# Patient Record
Sex: MALE | Race: ASIAN | NOT HISPANIC OR LATINO | URBAN - METROPOLITAN AREA
[De-identification: names, ages, dates, MRNs, and addresses within clinical notes are randomized per-mention and may not be internally consistent; named-entity substitution may affect disease eponyms.]

---

## 2019-11-12 ENCOUNTER — INPATIENT (INPATIENT)
Facility: HOSPITAL | Age: 74
LOS: 7 days | Discharge: NURSING, REHAB OR LONG-TERM CARE | End: 2019-11-20
Attending: STUDENT IN AN ORGANIZED HEALTH CARE EDUCATION/TRAINING PROGRAM | Admitting: STUDENT IN AN ORGANIZED HEALTH CARE EDUCATION/TRAINING PROGRAM
Payer: MEDICAID

## 2019-11-12 VITALS
TEMPERATURE: 98 F | OXYGEN SATURATION: 96 % | DIASTOLIC BLOOD PRESSURE: 33 MMHG | SYSTOLIC BLOOD PRESSURE: 109 MMHG | HEART RATE: 78 BPM | RESPIRATION RATE: 16 BRPM

## 2019-11-12 LAB
ALBUMIN SERPL ELPH-MCNC: 3.7 G/DL — SIGNIFICANT CHANGE UP (ref 3.3–5)
ALP SERPL-CCNC: 98 U/L — SIGNIFICANT CHANGE UP (ref 40–120)
ALT FLD-CCNC: 43 U/L — HIGH (ref 4–41)
ANION GAP SERPL CALC-SCNC: 20 MMO/L — HIGH (ref 7–14)
APAP SERPL-MCNC: < 15 UG/ML — LOW (ref 15–25)
APPEARANCE UR: CLEAR — SIGNIFICANT CHANGE UP
APTT BLD: 26.8 SEC — LOW (ref 27.5–36.3)
AST SERPL-CCNC: 30 U/L — SIGNIFICANT CHANGE UP (ref 4–40)
BACTERIA # UR AUTO: NEGATIVE — SIGNIFICANT CHANGE UP
BASOPHILS # BLD AUTO: 0.05 K/UL — SIGNIFICANT CHANGE UP (ref 0–0.2)
BASOPHILS NFR BLD AUTO: 0.4 % — SIGNIFICANT CHANGE UP (ref 0–2)
BILIRUB SERPL-MCNC: 0.4 MG/DL — SIGNIFICANT CHANGE UP (ref 0.2–1.2)
BILIRUB UR-MCNC: NEGATIVE — SIGNIFICANT CHANGE UP
BLOOD UR QL VISUAL: NEGATIVE — SIGNIFICANT CHANGE UP
BUN SERPL-MCNC: 59 MG/DL — HIGH (ref 7–23)
CALCIUM SERPL-MCNC: 10.1 MG/DL — SIGNIFICANT CHANGE UP (ref 8.4–10.5)
CHLORIDE SERPL-SCNC: 110 MMOL/L — HIGH (ref 98–107)
CO2 SERPL-SCNC: 20 MMOL/L — LOW (ref 22–31)
COLOR SPEC: YELLOW — SIGNIFICANT CHANGE UP
CREAT SERPL-MCNC: 1.98 MG/DL — HIGH (ref 0.5–1.3)
EOSINOPHIL # BLD AUTO: 0.27 K/UL — SIGNIFICANT CHANGE UP (ref 0–0.5)
EOSINOPHIL NFR BLD AUTO: 2.2 % — SIGNIFICANT CHANGE UP (ref 0–6)
ETHANOL BLD-MCNC: < 10 MG/DL — SIGNIFICANT CHANGE UP
GLUCOSE SERPL-MCNC: 207 MG/DL — HIGH (ref 70–99)
GLUCOSE UR-MCNC: NEGATIVE — SIGNIFICANT CHANGE UP
HCT VFR BLD CALC: 39.9 % — SIGNIFICANT CHANGE UP (ref 39–50)
HGB BLD-MCNC: 12.8 G/DL — LOW (ref 13–17)
HYALINE CASTS # UR AUTO: NEGATIVE — SIGNIFICANT CHANGE UP
IMM GRANULOCYTES NFR BLD AUTO: 1.2 % — SIGNIFICANT CHANGE UP (ref 0–1.5)
INR BLD: 1.03 — SIGNIFICANT CHANGE UP (ref 0.88–1.17)
KETONES UR-MCNC: NEGATIVE — SIGNIFICANT CHANGE UP
LEUKOCYTE ESTERASE UR-ACNC: NEGATIVE — SIGNIFICANT CHANGE UP
LYMPHOCYTES # BLD AUTO: 2.69 K/UL — SIGNIFICANT CHANGE UP (ref 1–3.3)
LYMPHOCYTES # BLD AUTO: 21.5 % — SIGNIFICANT CHANGE UP (ref 13–44)
MAGNESIUM SERPL-MCNC: 2.3 MG/DL — SIGNIFICANT CHANGE UP (ref 1.6–2.6)
MCHC RBC-ENTMCNC: 28.4 PG — SIGNIFICANT CHANGE UP (ref 27–34)
MCHC RBC-ENTMCNC: 32.1 % — SIGNIFICANT CHANGE UP (ref 32–36)
MCV RBC AUTO: 88.7 FL — SIGNIFICANT CHANGE UP (ref 80–100)
MONOCYTES # BLD AUTO: 0.67 K/UL — SIGNIFICANT CHANGE UP (ref 0–0.9)
MONOCYTES NFR BLD AUTO: 5.4 % — SIGNIFICANT CHANGE UP (ref 2–14)
NEUTROPHILS # BLD AUTO: 8.68 K/UL — HIGH (ref 1.8–7.4)
NEUTROPHILS NFR BLD AUTO: 69.3 % — SIGNIFICANT CHANGE UP (ref 43–77)
NITRITE UR-MCNC: NEGATIVE — SIGNIFICANT CHANGE UP
NRBC # FLD: 0.04 K/UL — SIGNIFICANT CHANGE UP (ref 0–0)
PH UR: 6 — SIGNIFICANT CHANGE UP (ref 5–8)
PLATELET # BLD AUTO: 315 K/UL — SIGNIFICANT CHANGE UP (ref 150–400)
PMV BLD: SIGNIFICANT CHANGE UP FL (ref 7–13)
POTASSIUM SERPL-MCNC: 5.6 MMOL/L — HIGH (ref 3.5–5.3)
POTASSIUM SERPL-SCNC: 5.6 MMOL/L — HIGH (ref 3.5–5.3)
PROT SERPL-MCNC: 7 G/DL — SIGNIFICANT CHANGE UP (ref 6–8.3)
PROT UR-MCNC: 20 — SIGNIFICANT CHANGE UP
PROTHROM AB SERPL-ACNC: 11.8 SEC — SIGNIFICANT CHANGE UP (ref 9.8–13.1)
RBC # BLD: 4.5 M/UL — SIGNIFICANT CHANGE UP (ref 4.2–5.8)
RBC # FLD: 14 % — SIGNIFICANT CHANGE UP (ref 10.3–14.5)
RBC CASTS # UR COMP ASSIST: SIGNIFICANT CHANGE UP (ref 0–?)
REVIEW TO FOLLOW: YES — SIGNIFICANT CHANGE UP
SALICYLATES SERPL-MCNC: < 5 MG/DL — LOW (ref 15–30)
SODIUM SERPL-SCNC: 150 MMOL/L — HIGH (ref 135–145)
SP GR SPEC: 1.02 — SIGNIFICANT CHANGE UP (ref 1–1.04)
SQUAMOUS # UR AUTO: SIGNIFICANT CHANGE UP
TROPONIN T, HIGH SENSITIVITY: 58 NG/L — CRITICAL HIGH (ref ?–14)
TSH SERPL-MCNC: 2.38 UIU/ML — SIGNIFICANT CHANGE UP (ref 0.27–4.2)
UROBILINOGEN FLD QL: NORMAL — SIGNIFICANT CHANGE UP
WBC # BLD: 12.51 K/UL — HIGH (ref 3.8–10.5)
WBC # FLD AUTO: 12.51 K/UL — HIGH (ref 3.8–10.5)
WBC UR QL: SIGNIFICANT CHANGE UP (ref 0–?)

## 2019-11-12 PROCEDURE — 71045 X-RAY EXAM CHEST 1 VIEW: CPT | Mod: 26

## 2019-11-12 PROCEDURE — 70450 CT HEAD/BRAIN W/O DYE: CPT | Mod: 26

## 2019-11-12 RX ORDER — SODIUM CHLORIDE 9 MG/ML
1000 INJECTION INTRAMUSCULAR; INTRAVENOUS; SUBCUTANEOUS
Refills: 0 | Status: DISCONTINUED | OUTPATIENT
Start: 2019-11-12 | End: 2019-11-13

## 2019-11-12 RX ORDER — SODIUM CHLORIDE 9 MG/ML
1000 INJECTION INTRAMUSCULAR; INTRAVENOUS; SUBCUTANEOUS ONCE
Refills: 0 | Status: DISCONTINUED | OUTPATIENT
Start: 2019-11-12 | End: 2019-11-12

## 2019-11-12 NOTE — ED ADULT NURSE NOTE - CHIEF COMPLAINT QUOTE
A and O x 2 usually x 4 last normal 90 min ago as per staff at [facility]   drowsy in triage   hx CVA DM HTN      [facility] called for stroke eval   no code stroke called

## 2019-11-12 NOTE — ED PROVIDER NOTE - PROGRESS NOTE DETAILS
Elevated lactate responding to IV fluids. Neurology consulted for lacunar ischemic area seen on CT scan. Patient AOx1

## 2019-11-12 NOTE — ED ADULT TRIAGE NOTE - CHIEF COMPLAINT QUOTE
A and O x 2 usually x 4 last normal 90 min ago as per staff at [facility]   drowsy in triage   hx CVA DM HTN      [facility] called for stroke eval A and O x 2 usually x 4 last normal 90 min ago as per staff at [facility]   drowsy in triage   hx CVA DM HTN      [facility] called for stroke eval   no code stroke called

## 2019-11-12 NOTE — ED PROVIDER NOTE - CLINICAL SUMMARY MEDICAL DECISION MAKING FREE TEXT BOX
75 yo HTN, HLD, TIIDM, CVA, CAD s/p double byspass presenting for AMS potentially in setting of severe volume depletion of ischemic stroke, however last normal unknown.  -cbc, cmp, pt/INR, VBG  -CT head  -UA, BCX, UCX  -IVF 73 yo HTN, HLD, TIIDM, CVA, CAD s/p double byspass presenting for AMS potentially in setting of severe volume depletion of ischemic stroke, however last normal unknown. No sign of infectious etiology, no signs of ischemia.   -cbc, cmp, pt/INR, VBG  -CT head  -UA, BCX, UCX  -IVF 73 yo HTN, HLD, TIIDM, CVA, CAD s/p double byspass presenting for AMS potentially in setting of severe volume depletion of ischemic stroke, however last normal unknown. No sign of infectious etiology, no signs of ischemia.   -cbc, cmp, pt/INR, VBG  -CT head  -UA, BCX, UCX  -IVF    Dr. Washington: I agree with the above provided history and exam and addend/modify it as follows.  74M hx HTN, DMII, HLD, CVA without residual deficit, CAD s/p 2vCABG, recent admission for UTI with hyponatremia with placement in [facility] now sent in for AMS.  Patient was last seen normal by his family 36 hours prior to presentation: at baseline is conversant and oriented, now drowsy, not conversant, possible right facial droop although moving all extremities.  Nursing facility called and unable to give a time last known normal: state that the patient has become gradually more confused over the past day.  No recent fever, chills, or cough to their knowledge.  Stroke considered however out the window for TPA or percutaneous therapy.  Will R/O ICH.  No clear evidence of infection at this time but will screen.  Possible metabolic/electrolyte derangement.  Will evaluate fo hypothyroidism.  CT head expedited.  Labs, UA, urine and blood culture, CXR, EKG.  Re-eval.  Anticipate admission.

## 2019-11-12 NOTE — ED ADULT NURSE NOTE - OBJECTIVE STATEMENT
Pt rcvd to 24, sent by family from home for onset AMS over past 2 days, family alerted by visiting care staff for weakness and lethargy.  Per staff (contacted by ED covering resident), unsure patient's exact mental baseline, but did not witness any acute pain or complaint by patient.  Pt received as aaox1 (self), unable to provide, place/location/situational history.  Pt denies pain.  Appearing weak, drowsy, drifts off to sleep easily in between care tasks.  Pt noted to be cachectic/frail appearing, weak, w/ dry, tenting, flaking skin.  IV placed to L Hand #22g by float rn, labs drawn/sent as ordered.  IVF LR maintenance infusing slowly per ED MD Washington verbal order. Pt rectally afebrile, straight cath'd for urine under sterile technique 500cc urine drained, specimens sent to lab as ordered. No skin breakdown noted.  HR NSR on CM, Respirations even/unlabored, on room air pt o2sat 93%, placed on 2L NC, o2sat increasd to 96%.  BP low, stable at this time.  Pending CTH, XR, labs results and further plan of care.  Safety precautions in place, comfort care provided, will monitor closely. Pt rcvd to 24, sent by family from [facility] for AMS over past 2 days, family alerted by care staff for weakness and lethargy.  Per staff (contacted by ED covering resident), unsure patient's exact mental baseline, but did not witness any acute pain or complaint by patient.  Pt received as aaox1 (self), unable to provide, place/location/situational history.  Pt denies pain.  Appearing weak, drowsy, drifts off to sleep easily in between care tasks.  Pt noted to be cachectic/frail appearing, weak, w/ dry, tenting, flaking skin.  IV placed to L Hand #22g by float rn, labs drawn/sent as ordered.  IVF LR maintenance infusing slowly per ED MD Washington verbal order. Pt rectally afebrile, straight cath'd for urine under sterile technique 500cc urine drained, specimens sent to lab as ordered. No skin breakdown noted.  HR NSR on CM, Respirations even/unlabored, on room air pt o2sat 93%, placed on 2L NC, o2sat increasd to 96%.  BP low, stable at this time.  Pending CTH, XR, labs results and further plan of care.  Safety precautions in place, comfort care provided, will monitor closely.

## 2019-11-12 NOTE — ED ADULT NURSE NOTE - ED STAT RN HANDOFF DETAILS
handoff rpt given to floor RN - pt vitally stable, no acute distress. Confirmed w/ admitting resident @ bedside that patient does not require telemetry monitoring.  Pending further admission orders.  Pt increasingly alert, responsive, but aaox1 at this time.

## 2019-11-12 NOTE — ED PROVIDER NOTE - OBJECTIVE STATEMENT
Discussed with Nursing home staff who state that the patient was found more lethargic by the patient's family and nurse went to evaluate and was found lethargic and minimally responsive w/ BP 94/57, T 94.6F. She stated he has been more lethargic over the past 2 days but do not know his exact last known normal and that he came to the nursing home for weakness. 73 yo HTN, HLD, TIIDM, CVA, CAD s/p double byspass presenting for AMS.   Per son at bedside he was generally weak since leaving [facility] last Friday where he was treated UTI and went to a nursing home afterwards. He was seen last 11/11 and was weak but able to converse. This morning he was minimally responsive and lethargic. He also state he was not eating or drinking at the hospital.  Discussed with Nursing home staff who state that the patient was found more lethargic by the patient's family and nurse went to evaluate and was found lethargic and minimally responsive w/ BP 94/57, T 94.6F. She stated he has been more lethargic over the past 2 days but do not know his exact last known normal and that he came to the nursing home for weakness.  Patient unable to provide story.

## 2019-11-12 NOTE — ED PROVIDER NOTE - ATTENDING CONTRIBUTION TO CARE
JUANJOSE Washington MD performed a history and physical exam of the patient and discussed their management with the resident and /or advanced care provider. I reviewed the resident and /or ACP's note and agree with the documented findings and plan of care. My medical decision making and observations are found above.    Drowsy but arousable to noxious stimuli, moaning: not answering questions.  Thin. Dry mucous membranes.  Breath sounds clear in all lung fields, not tachypneic, no accessory muscle use.  Normal and regular heart rate without murmurs, rubs, or gallops.  Abdomen soft and nontender.  No lower extremity swelling or tenderness.  Opens eyes and moans to noxious stimuli, moves all extremities with purpose, flattening of right nasolabial fold, otherwise not cooperative to CN exam, does not appear to demonstrate no restriction of EOM or gaze preference.

## 2019-11-12 NOTE — ED ADULT NURSE NOTE - INTERVENTIONS DEFINITIONS
[place] to call system/Instruct patient to call for assistance/Non-slip footwear when patient is off stretcher/Physically safe environment: no spills, clutter or unnecessary equipment/Monitor for mental status changes and reorient to person, place, and time/Reinforce activity limits and safety measures with patient and family/Provide visual cue, wrist band, yellow gown, etc./Provide visual clues: red socks/Stretcher in lowest position, wheels locked, appropriate side rails in place

## 2019-11-12 NOTE — ED PROVIDER NOTE - CRITICAL CARE PROVIDED
consult w/ pt's family directly relating to pts condition/additional history taking/interpretation of diagnostic studies/direct patient care (not related to procedure)/documentation

## 2019-11-12 NOTE — ED PROVIDER NOTE - PMH
CAD (coronary artery disease)    CVA (cerebral vascular accident)    Diabetes    HLD (hyperlipidemia)    HTN (hypertension)

## 2019-11-12 NOTE — ED ADULT NURSE REASSESSMENT NOTE - NS ED NURSE REASSESS COMMENT FT1
PT family requesting update, covering ED MD hBatti made aware and will come to discuss plan of care/results so far w/ patient and family.  Attempted to repeat lab work per MD orders, unsuccessful, pt w/ poor vasculature.  Will made MD aware to draw repeat labs via [facility] IV or [place].

## 2019-11-12 NOTE — ED PROVIDER NOTE - PHYSICAL EXAMINATION
PHYSICAL EXAM:  GENERAL: chronically ill appearing, NAD, lethargic  HEAD:  Atraumatic, Normocephalic  EYES: EOMI, PERRLA, conjunctiva and sclera clear  HENT: dry mucous membranes  NECK: Supple, No JVD  CHEST/LUNG: CABG surgical incision, Clear to auscultation bilaterally; No wheeze  HEART: Regular rate and rhythm; No murmurs, rubs, or gallops  ABDOMEN: Soft, Nontender, Nondistended; Bowel sounds present  EXTREMITIES: No clubbing, cyanosis, or edema  PSYCH: AAOx1  NEUROLOGY: non-focal, moving all extemities, generalized weakness, not following commands  SKIN: No rashes or lesions

## 2019-11-13 DIAGNOSIS — I63.9 CEREBRAL INFARCTION, UNSPECIFIED: ICD-10-CM

## 2019-11-13 DIAGNOSIS — Z29.9 ENCOUNTER FOR PROPHYLACTIC MEASURES, UNSPECIFIED: ICD-10-CM

## 2019-11-13 DIAGNOSIS — E87.2 ACIDOSIS: ICD-10-CM

## 2019-11-13 DIAGNOSIS — N39.41 URGE INCONTINENCE: ICD-10-CM

## 2019-11-13 DIAGNOSIS — G93.40 ENCEPHALOPATHY, UNSPECIFIED: ICD-10-CM

## 2019-11-13 DIAGNOSIS — I25.709 ATHEROSCLEROSIS OF CORONARY ARTERY BYPASS GRAFT(S), UNSPECIFIED, WITH UNSPECIFIED ANGINA PECTORIS: ICD-10-CM

## 2019-11-13 DIAGNOSIS — E87.0 HYPEROSMOLALITY AND HYPERNATREMIA: ICD-10-CM

## 2019-11-13 DIAGNOSIS — Z95.1 PRESENCE OF AORTOCORONARY BYPASS GRAFT: Chronic | ICD-10-CM

## 2019-11-13 DIAGNOSIS — E11.69 TYPE 2 DIABETES MELLITUS WITH OTHER SPECIFIED COMPLICATION: ICD-10-CM

## 2019-11-13 DIAGNOSIS — R65.10 SYSTEMIC INFLAMMATORY RESPONSE SYNDROME (SIRS) OF NON-INFECTIOUS ORIGIN WITHOUT ACUTE ORGAN DYSFUNCTION: ICD-10-CM

## 2019-11-13 DIAGNOSIS — N17.9 ACUTE KIDNEY FAILURE, UNSPECIFIED: ICD-10-CM

## 2019-11-13 LAB
ANION GAP SERPL CALC-SCNC: 15 MMO/L — HIGH (ref 7–14)
ANION GAP SERPL CALC-SCNC: 17 MMO/L — HIGH (ref 7–14)
BASE EXCESS BLDV CALC-SCNC: -2.7 MMOL/L — SIGNIFICANT CHANGE UP
BASE EXCESS BLDV CALC-SCNC: -3.6 MMOL/L — SIGNIFICANT CHANGE UP
BASE EXCESS BLDV CALC-SCNC: 0.7 MMOL/L — SIGNIFICANT CHANGE UP
BASOPHILS # BLD AUTO: 0.08 K/UL — SIGNIFICANT CHANGE UP (ref 0–0.2)
BASOPHILS NFR BLD AUTO: 0.6 % — SIGNIFICANT CHANGE UP (ref 0–2)
BLOOD GAS VENOUS - CREATININE: 1.9 MG/DL — HIGH (ref 0.5–1.3)
BLOOD GAS VENOUS - CREATININE: 2.03 MG/DL — HIGH (ref 0.5–1.3)
BLOOD GAS VENOUS - CREATININE: SIGNIFICANT CHANGE UP MG/DL (ref 0.5–1.3)
BLOOD GAS VENOUS - FIO2: 21 — SIGNIFICANT CHANGE UP
BLOOD GAS VENOUS - FIO2: 21 — SIGNIFICANT CHANGE UP
BUN SERPL-MCNC: 43 MG/DL — HIGH (ref 7–23)
BUN SERPL-MCNC: 52 MG/DL — HIGH (ref 7–23)
CALCIUM SERPL-MCNC: 9.3 MG/DL — SIGNIFICANT CHANGE UP (ref 8.4–10.5)
CALCIUM SERPL-MCNC: 9.6 MG/DL — SIGNIFICANT CHANGE UP (ref 8.4–10.5)
CHLORIDE BLDV-SCNC: 112 MMOL/L — HIGH (ref 96–108)
CHLORIDE BLDV-SCNC: 117 MMOL/L — HIGH (ref 96–108)
CHLORIDE BLDV-SCNC: 117 MMOL/L — HIGH (ref 96–108)
CHLORIDE SERPL-SCNC: 107 MMOL/L — SIGNIFICANT CHANGE UP (ref 98–107)
CHLORIDE SERPL-SCNC: 110 MMOL/L — HIGH (ref 98–107)
CO2 SERPL-SCNC: 18 MMOL/L — LOW (ref 22–31)
CO2 SERPL-SCNC: 25 MMOL/L — SIGNIFICANT CHANGE UP (ref 22–31)
CREAT SERPL-MCNC: 1.33 MG/DL — HIGH (ref 0.5–1.3)
CREAT SERPL-MCNC: 1.54 MG/DL — HIGH (ref 0.5–1.3)
EOSINOPHIL # BLD AUTO: 0.35 K/UL — SIGNIFICANT CHANGE UP (ref 0–0.5)
EOSINOPHIL NFR BLD AUTO: 2.5 % — SIGNIFICANT CHANGE UP (ref 0–6)
GAS PNL BLDV: 138 MMOL/L — SIGNIFICANT CHANGE UP (ref 136–146)
GAS PNL BLDV: 145 MMOL/L — SIGNIFICANT CHANGE UP (ref 136–146)
GAS PNL BLDV: 148 MMOL/L — HIGH (ref 136–146)
GLUCOSE BLDC GLUCOMTR-MCNC: 163 MG/DL — HIGH (ref 70–99)
GLUCOSE BLDC GLUCOMTR-MCNC: 294 MG/DL — HIGH (ref 70–99)
GLUCOSE BLDC GLUCOMTR-MCNC: 332 MG/DL — HIGH (ref 70–99)
GLUCOSE BLDV-MCNC: 194 MG/DL — HIGH (ref 70–99)
GLUCOSE BLDV-MCNC: 228 MG/DL — HIGH (ref 70–99)
GLUCOSE BLDV-MCNC: 373 MG/DL — HIGH (ref 70–99)
GLUCOSE SERPL-MCNC: 197 MG/DL — HIGH (ref 70–99)
GLUCOSE SERPL-MCNC: 371 MG/DL — HIGH (ref 70–99)
HCO3 BLDV-SCNC: 20 MMOL/L — SIGNIFICANT CHANGE UP (ref 20–27)
HCO3 BLDV-SCNC: 22 MMOL/L — SIGNIFICANT CHANGE UP (ref 20–27)
HCO3 BLDV-SCNC: 24 MMOL/L — SIGNIFICANT CHANGE UP (ref 20–27)
HCT VFR BLD CALC: 41.5 % — SIGNIFICANT CHANGE UP (ref 39–50)
HCT VFR BLDV CALC: 40.7 % — SIGNIFICANT CHANGE UP (ref 39–51)
HCT VFR BLDV CALC: 41.4 % — SIGNIFICANT CHANGE UP (ref 39–51)
HCT VFR BLDV CALC: 44.5 % — SIGNIFICANT CHANGE UP (ref 39–51)
HGB BLD-MCNC: 13.1 G/DL — SIGNIFICANT CHANGE UP (ref 13–17)
HGB BLDV-MCNC: 13.2 G/DL — SIGNIFICANT CHANGE UP (ref 13–17)
HGB BLDV-MCNC: 13.5 G/DL — SIGNIFICANT CHANGE UP (ref 13–17)
HGB BLDV-MCNC: 14.5 G/DL — SIGNIFICANT CHANGE UP (ref 13–17)
IMM GRANULOCYTES NFR BLD AUTO: 0.7 % — SIGNIFICANT CHANGE UP (ref 0–1.5)
LACTATE BLDV-MCNC: 4.8 MMOL/L — CRITICAL HIGH (ref 0.5–2)
LACTATE BLDV-MCNC: 8.5 MMOL/L — CRITICAL HIGH (ref 0.5–2)
LACTATE BLDV-MCNC: 9.5 MMOL/L — CRITICAL HIGH (ref 0.5–2)
LACTATE SERPL-SCNC: 2.8 MMOL/L — HIGH (ref 0.5–2)
LYMPHOCYTES # BLD AUTO: 24.5 % — SIGNIFICANT CHANGE UP (ref 13–44)
LYMPHOCYTES # BLD AUTO: 3.4 K/UL — HIGH (ref 1–3.3)
MAGNESIUM SERPL-MCNC: 1.7 MG/DL — SIGNIFICANT CHANGE UP (ref 1.6–2.6)
MAGNESIUM SERPL-MCNC: 1.9 MG/DL — SIGNIFICANT CHANGE UP (ref 1.6–2.6)
MCHC RBC-ENTMCNC: 28.6 PG — SIGNIFICANT CHANGE UP (ref 27–34)
MCHC RBC-ENTMCNC: 31.6 % — LOW (ref 32–36)
MCV RBC AUTO: 90.6 FL — SIGNIFICANT CHANGE UP (ref 80–100)
MONOCYTES # BLD AUTO: 0.65 K/UL — SIGNIFICANT CHANGE UP (ref 0–0.9)
MONOCYTES NFR BLD AUTO: 4.7 % — SIGNIFICANT CHANGE UP (ref 2–14)
NEUTROPHILS # BLD AUTO: 9.27 K/UL — HIGH (ref 1.8–7.4)
NEUTROPHILS NFR BLD AUTO: 67 % — SIGNIFICANT CHANGE UP (ref 43–77)
NRBC # FLD: 0 K/UL — SIGNIFICANT CHANGE UP (ref 0–0)
OSMOLALITY SERPL: 348 MOSMO/KG — HIGH (ref 275–295)
OSMOLALITY UR: 482 MOSMO/KG — SIGNIFICANT CHANGE UP (ref 50–1200)
PCO2 BLDV: 41 MMHG — SIGNIFICANT CHANGE UP (ref 41–51)
PCO2 BLDV: 43 MMHG — SIGNIFICANT CHANGE UP (ref 41–51)
PCO2 BLDV: 44 MMHG — SIGNIFICANT CHANGE UP (ref 41–51)
PH BLDV: 7.31 PH — LOW (ref 7.32–7.43)
PH BLDV: 7.34 PH — SIGNIFICANT CHANGE UP (ref 7.32–7.43)
PH BLDV: 7.4 PH — SIGNIFICANT CHANGE UP (ref 7.32–7.43)
PHOSPHATE SERPL-MCNC: 3.9 MG/DL — SIGNIFICANT CHANGE UP (ref 2.5–4.5)
PHOSPHATE SERPL-MCNC: 4.8 MG/DL — HIGH (ref 2.5–4.5)
PLATELET # BLD AUTO: 378 K/UL — SIGNIFICANT CHANGE UP (ref 150–400)
PMV BLD: 11.1 FL — SIGNIFICANT CHANGE UP (ref 7–13)
PO2 BLDV: 38 MMHG — SIGNIFICANT CHANGE UP (ref 35–40)
PO2 BLDV: 41 MMHG — HIGH (ref 35–40)
PO2 BLDV: 52 MMHG — HIGH (ref 35–40)
POTASSIUM BLDV-SCNC: 5.1 MMOL/L — HIGH (ref 3.4–4.5)
POTASSIUM BLDV-SCNC: 5.2 MMOL/L — HIGH (ref 3.4–4.5)
POTASSIUM BLDV-SCNC: 5.6 MMOL/L — HIGH (ref 3.4–4.5)
POTASSIUM SERPL-MCNC: 4.9 MMOL/L — SIGNIFICANT CHANGE UP (ref 3.5–5.3)
POTASSIUM SERPL-MCNC: SIGNIFICANT CHANGE UP MMOL/L (ref 3.5–5.3)
POTASSIUM SERPL-SCNC: 4.9 MMOL/L — SIGNIFICANT CHANGE UP (ref 3.5–5.3)
POTASSIUM SERPL-SCNC: SIGNIFICANT CHANGE UP MMOL/L (ref 3.5–5.3)
RBC # BLD: 4.58 M/UL — SIGNIFICANT CHANGE UP (ref 4.2–5.8)
RBC # FLD: 13.9 % — SIGNIFICANT CHANGE UP (ref 10.3–14.5)
SAO2 % BLDV: 63.2 % — SIGNIFICANT CHANGE UP (ref 60–85)
SAO2 % BLDV: 68.6 % — SIGNIFICANT CHANGE UP (ref 60–85)
SAO2 % BLDV: 78.8 % — SIGNIFICANT CHANGE UP (ref 60–85)
SODIUM SERPL-SCNC: 142 MMOL/L — SIGNIFICANT CHANGE UP (ref 135–145)
SODIUM SERPL-SCNC: 150 MMOL/L — HIGH (ref 135–145)
SODIUM UR-SCNC: 135 MMOL/L — SIGNIFICANT CHANGE UP
TROPONIN T, HIGH SENSITIVITY: 56 NG/L — CRITICAL HIGH (ref ?–14)
WBC # BLD: 13.85 K/UL — HIGH (ref 3.8–10.5)
WBC # FLD AUTO: 13.85 K/UL — HIGH (ref 3.8–10.5)

## 2019-11-13 PROCEDURE — 12345: CPT | Mod: NC,GC

## 2019-11-13 PROCEDURE — 76770 US EXAM ABDO BACK WALL COMP: CPT | Mod: 26

## 2019-11-13 PROCEDURE — 99222 1ST HOSP IP/OBS MODERATE 55: CPT

## 2019-11-13 PROCEDURE — 99254 IP/OBS CNSLTJ NEW/EST MOD 60: CPT | Mod: GC

## 2019-11-13 PROCEDURE — 99223 1ST HOSP IP/OBS HIGH 75: CPT | Mod: GC

## 2019-11-13 RX ORDER — GABAPENTIN 400 MG/1
3 CAPSULE ORAL
Qty: 0 | Refills: 0 | DISCHARGE

## 2019-11-13 RX ORDER — SODIUM CHLORIDE 9 MG/ML
1000 INJECTION INTRAMUSCULAR; INTRAVENOUS; SUBCUTANEOUS
Refills: 0 | Status: DISCONTINUED | OUTPATIENT
Start: 2019-11-13 | End: 2019-11-13

## 2019-11-13 RX ORDER — DEXTROSE 50 % IN WATER 50 %
25 SYRINGE (ML) INTRAVENOUS ONCE
Refills: 0 | Status: DISCONTINUED | OUTPATIENT
Start: 2019-11-13 | End: 2019-11-20

## 2019-11-13 RX ORDER — ASPIRIN/CALCIUM CARB/MAGNESIUM 324 MG
81 TABLET ORAL DAILY
Refills: 0 | Status: DISCONTINUED | OUTPATIENT
Start: 2019-11-13 | End: 2019-11-20

## 2019-11-13 RX ORDER — PANTOPRAZOLE SODIUM 20 MG/1
40 TABLET, DELAYED RELEASE ORAL
Refills: 0 | Status: DISCONTINUED | OUTPATIENT
Start: 2019-11-13 | End: 2019-11-20

## 2019-11-13 RX ORDER — SODIUM CHLORIDE 9 MG/ML
1000 INJECTION, SOLUTION INTRAVENOUS
Refills: 0 | Status: DISCONTINUED | OUTPATIENT
Start: 2019-11-13 | End: 2019-11-20

## 2019-11-13 RX ORDER — ATORVASTATIN CALCIUM 80 MG/1
10 TABLET, FILM COATED ORAL AT BEDTIME
Refills: 0 | Status: DISCONTINUED | OUTPATIENT
Start: 2019-11-13 | End: 2019-11-13

## 2019-11-13 RX ORDER — GABAPENTIN 400 MG/1
300 CAPSULE ORAL AT BEDTIME
Refills: 0 | Status: DISCONTINUED | OUTPATIENT
Start: 2019-11-13 | End: 2019-11-13

## 2019-11-13 RX ORDER — DEXTROSE 50 % IN WATER 50 %
15 SYRINGE (ML) INTRAVENOUS ONCE
Refills: 0 | Status: DISCONTINUED | OUTPATIENT
Start: 2019-11-13 | End: 2019-11-20

## 2019-11-13 RX ORDER — CLOPIDOGREL BISULFATE 75 MG/1
75 TABLET, FILM COATED ORAL DAILY
Refills: 0 | Status: DISCONTINUED | OUTPATIENT
Start: 2019-11-13 | End: 2019-11-20

## 2019-11-13 RX ORDER — INSULIN DETEMIR 100/ML (3)
11 INSULIN PEN (ML) SUBCUTANEOUS
Qty: 0 | Refills: 0 | DISCHARGE

## 2019-11-13 RX ORDER — INSULIN LISPRO 100/ML
VIAL (ML) SUBCUTANEOUS
Refills: 0 | Status: DISCONTINUED | OUTPATIENT
Start: 2019-11-13 | End: 2019-11-14

## 2019-11-13 RX ORDER — SODIUM CHLORIDE 9 MG/ML
1000 INJECTION, SOLUTION INTRAVENOUS ONCE
Refills: 0 | Status: COMPLETED | OUTPATIENT
Start: 2019-11-13 | End: 2019-11-13

## 2019-11-13 RX ORDER — INSULIN LISPRO 100/ML
VIAL (ML) SUBCUTANEOUS AT BEDTIME
Refills: 0 | Status: DISCONTINUED | OUTPATIENT
Start: 2019-11-13 | End: 2019-11-14

## 2019-11-13 RX ORDER — TAMSULOSIN HYDROCHLORIDE 0.4 MG/1
0.4 CAPSULE ORAL AT BEDTIME
Refills: 0 | Status: DISCONTINUED | OUTPATIENT
Start: 2019-11-13 | End: 2019-11-20

## 2019-11-13 RX ORDER — ACETAMINOPHEN 500 MG
2 TABLET ORAL
Qty: 0 | Refills: 0 | DISCHARGE

## 2019-11-13 RX ORDER — GABAPENTIN 400 MG/1
1 CAPSULE ORAL
Qty: 0 | Refills: 0 | DISCHARGE

## 2019-11-13 RX ORDER — DOCUSATE SODIUM 100 MG
1 CAPSULE ORAL
Qty: 0 | Refills: 0 | DISCHARGE

## 2019-11-13 RX ORDER — ATORVASTATIN CALCIUM 80 MG/1
1 TABLET, FILM COATED ORAL
Qty: 0 | Refills: 0 | DISCHARGE

## 2019-11-13 RX ORDER — METFORMIN HYDROCHLORIDE 850 MG/1
1 TABLET ORAL
Qty: 0 | Refills: 0 | DISCHARGE

## 2019-11-13 RX ORDER — SENNA PLUS 8.6 MG/1
1 TABLET ORAL
Qty: 0 | Refills: 0 | DISCHARGE

## 2019-11-13 RX ORDER — SODIUM CHLORIDE 9 MG/ML
1000 INJECTION, SOLUTION INTRAVENOUS
Refills: 0 | Status: DISCONTINUED | OUTPATIENT
Start: 2019-11-13 | End: 2019-11-14

## 2019-11-13 RX ORDER — GLUCAGON INJECTION, SOLUTION 0.5 MG/.1ML
1 INJECTION, SOLUTION SUBCUTANEOUS ONCE
Refills: 0 | Status: DISCONTINUED | OUTPATIENT
Start: 2019-11-13 | End: 2019-11-20

## 2019-11-13 RX ORDER — ASPIRIN/CALCIUM CARB/MAGNESIUM 324 MG
1 TABLET ORAL
Qty: 0 | Refills: 0 | DISCHARGE

## 2019-11-13 RX ORDER — HEPARIN SODIUM 5000 [USP'U]/ML
5000 INJECTION INTRAVENOUS; SUBCUTANEOUS EVERY 12 HOURS
Refills: 0 | Status: DISCONTINUED | OUTPATIENT
Start: 2019-11-13 | End: 2019-11-13

## 2019-11-13 RX ORDER — DEXTROSE 50 % IN WATER 50 %
12.5 SYRINGE (ML) INTRAVENOUS ONCE
Refills: 0 | Status: DISCONTINUED | OUTPATIENT
Start: 2019-11-13 | End: 2019-11-20

## 2019-11-13 RX ORDER — OXYBUTYNIN CHLORIDE 5 MG
1 TABLET ORAL
Qty: 0 | Refills: 0 | DISCHARGE

## 2019-11-13 RX ORDER — GABAPENTIN 400 MG/1
100 CAPSULE ORAL THREE TIMES A DAY
Refills: 0 | Status: DISCONTINUED | OUTPATIENT
Start: 2019-11-13 | End: 2019-11-13

## 2019-11-13 RX ORDER — INSULIN GLARGINE 100 [IU]/ML
5 INJECTION, SOLUTION SUBCUTANEOUS AT BEDTIME
Refills: 0 | Status: DISCONTINUED | OUTPATIENT
Start: 2019-11-13 | End: 2019-11-14

## 2019-11-13 RX ORDER — SODIUM CHLORIDE 9 MG/ML
1000 INJECTION, SOLUTION INTRAVENOUS
Refills: 0 | Status: DISCONTINUED | OUTPATIENT
Start: 2019-11-13 | End: 2019-11-13

## 2019-11-13 RX ORDER — GABAPENTIN 400 MG/1
100 CAPSULE ORAL DAILY
Refills: 0 | Status: DISCONTINUED | OUTPATIENT
Start: 2019-11-13 | End: 2019-11-20

## 2019-11-13 RX ORDER — INFLUENZA VIRUS VACCINE 15; 15; 15; 15 UG/.5ML; UG/.5ML; UG/.5ML; UG/.5ML
0.5 SUSPENSION INTRAMUSCULAR ONCE
Refills: 0 | Status: DISCONTINUED | OUTPATIENT
Start: 2019-11-13 | End: 2019-11-20

## 2019-11-13 RX ORDER — OXYBUTYNIN CHLORIDE 5 MG
5 TABLET ORAL AT BEDTIME
Refills: 0 | Status: DISCONTINUED | OUTPATIENT
Start: 2019-11-13 | End: 2019-11-20

## 2019-11-13 RX ADMIN — SODIUM CHLORIDE 2000 MILLILITER(S): 9 INJECTION, SOLUTION INTRAVENOUS at 01:19

## 2019-11-13 RX ADMIN — TAMSULOSIN HYDROCHLORIDE 0.4 MILLIGRAM(S): 0.4 CAPSULE ORAL at 22:21

## 2019-11-13 RX ADMIN — Medication 4: at 18:20

## 2019-11-13 RX ADMIN — CLOPIDOGREL BISULFATE 75 MILLIGRAM(S): 75 TABLET, FILM COATED ORAL at 13:12

## 2019-11-13 RX ADMIN — Medication 81 MILLIGRAM(S): at 13:12

## 2019-11-13 RX ADMIN — Medication 5 MILLIGRAM(S): at 22:21

## 2019-11-13 RX ADMIN — SODIUM CHLORIDE 150 MILLILITER(S): 9 INJECTION, SOLUTION INTRAVENOUS at 02:11

## 2019-11-13 RX ADMIN — SODIUM CHLORIDE 1000 MILLILITER(S): 9 INJECTION, SOLUTION INTRAVENOUS at 02:06

## 2019-11-13 RX ADMIN — INSULIN GLARGINE 5 UNIT(S): 100 INJECTION, SOLUTION SUBCUTANEOUS at 22:19

## 2019-11-13 RX ADMIN — PANTOPRAZOLE SODIUM 40 MILLIGRAM(S): 20 TABLET, DELAYED RELEASE ORAL at 13:12

## 2019-11-13 RX ADMIN — SODIUM CHLORIDE 100 MILLILITER(S): 9 INJECTION, SOLUTION INTRAVENOUS at 13:12

## 2019-11-13 RX ADMIN — SODIUM CHLORIDE 100 MILLILITER(S): 9 INJECTION, SOLUTION INTRAVENOUS at 22:24

## 2019-11-13 RX ADMIN — Medication 1: at 22:34

## 2019-11-13 RX ADMIN — GABAPENTIN 100 MILLIGRAM(S): 400 CAPSULE ORAL at 13:12

## 2019-11-13 NOTE — H&P ADULT - NSHPLABSRESULTS_GEN_ALL_CORE
12.8   12.51 )-----------( 315      ( 12 Nov 2019 22:18 )             39.9       11-12    150<H>  |  110<H>  |  59<H>  ----------------------------<  207<H>  5.6<H>   |  20<L>  |  1.98<H>    Ca    10.1      12 Nov 2019 22:18  Mg     2.3     11-12    TPro  7.0  /  Alb  3.7  /  TBili  0.4  /  DBili  x   /  AST  30  /  ALT  43<H>  /  AlkPhos  98  11-12      < from: CT Head No Cont (11.12.19 @ 22:25) >    IMPRESSION:     No acute intracranial hemorrhage, mass effect, or CT evidence of a large   vascular territory infarct. Atrophy and chronic microvascular ischemic   gliotic changes. Indeterminate age bilateral gangliocapsular and right   thalamic lacunar infarct. MRI is more sensitive in detecting early   changes of ischemia if clinically indicated provided no MR   contraindications.      < end of copied text >      Urinalysis (11.12.19 @ 22:50)    Color: YELLOW    Urine Appearance: CLEAR    Glucose: NEGATIVE    Bilirubin: NEGATIVE    Ketone - Urine: NEGATIVE    Specific Gravity: 1.020    Blood: NEGATIVE    pH - Urine: 6.0    Protein, Urine: 20    Urobilinogen: NORMAL    Nitrite: NEGATIVE    Leukocyte Esterase Concentration: NEGATIVE    Red Blood Cell - Urine: 0-2    White Blood Cell - Urine: 0-2    Hyaline Casts: NEGATIVE    Bacteria: NEGATIVE    Squamous Epithelial: OCC LABS and ADDITIONAL STUDIES:                12.8   12.51 )-----------( 315      ( 12 Nov 2019 22:18 )              39.9     11-12    150<H>  |  110<H>  |  59<H>  ----------------------------<  207<H>  5.6<H>   |  20<L>  |  1.98<H>    Ca    10.1      12 Nov 2019 22:18  Mg     2.3     11-12    Osmolality, Serum (11.13.19 @ 00:30)    Osmolality, Serum: 348: REPEATED mosmo/kg    TPro  7.0  /  Alb  3.7  /  TBili  0.4  /  DBili  x   /  AST  30  /  ALT  43<H>  /  AlkPhos  98  11-12    Troponin T, High Sensitivity (11.12.19 @ 22:18)    Troponin T, High Sensitivity: 58  Troponin T, High Sensitivity (11.13.19 @ 00:30)    Troponin T, High Sensitivity: 56: Delta: 58 on 11/12/    Blood Gas Venous Comprehensive (11.13.19 @ 00:30)    Blood Gas Venous - Lactate: 9.5: Please note updated reference range. mmol/L  Blood Gas Venous Comprehensive (11.13.19 @ 02:18)    Blood Gas Venous - Lactate: 8.5: Please note updated reference range. mmol/L    < from: Xray Chest 1 View AP/PA (11.12.19 @ 22:04) >    FINDINGS:    Clear lungs.   There is no pleural effusion or pneumothorax.   The heart is normal in size.    IMPRESSION:    Clear lungs.    < end of copied text >    < from: CT Head No Cont (11.12.19 @ 22:25) >    IMPRESSION:     No acute intracranial hemorrhage, mass effect, or CT evidence of a large   vascular territory infarct. Atrophy and chronic microvascular ischemic   gliotic changes. Indeterminate age bilateral gangliocapsular and right   thalamic lacunar infarct. MRI is more sensitive in detecting early   changes of ischemia if clinically indicated provided no MR   contraindications.      < end of copied text >      Urinalysis (11.12.19 @ 22:50)    Color: YELLOW    Urine Appearance: CLEAR    Glucose: NEGATIVE    Bilirubin: NEGATIVE    Ketone - Urine: NEGATIVE    Specific Gravity: 1.020    Blood: NEGATIVE    pH - Urine: 6.0    Protein, Urine: 20    Urobilinogen: NORMAL    Nitrite: NEGATIVE    Leukocyte Esterase Concentration: NEGATIVE    Red Blood Cell - Urine: 0-2    White Blood Cell - Urine: 0-2    Hyaline Casts: NEGATIVE    Bacteria: NEGATIVE    Squamous Epithelial: OCC    EKG - NSR at 69, nl axis, QRS 78, QTc 439, Q waves II, III, aVF, TWI V1-V3 (no prior EKG available for comparison)

## 2019-11-13 NOTE — H&P ADULT - HISTORY OF PRESENT ILLNESS
73 yo M with PMhx of vertebro-basilar artery syndrome, DMII, HTN, HLD, OA, urge incontinence, TIA, and recurrent falls presents from rehab center with AMS, hypothermia, and hypotension. As per son at bedside, patient was recently admitted to the [facility] on 11/5/2019 after being found unconsciousness on the floor in his apartment. He was then found to have hyponatremia and septic due to UTI. His course was further complicated by volume overload and as result, he was diuresed. His mental status gradually improved his baseline and was discharged to rehab on 11/8/2019. At rehab, he was making good progress with physical therapy and 75 yo M with PMhx of vertebro-basilar artery syndrome, DMII, HTN, HLD, OA, urge incontinence, TIA, and recurrent falls presents from rehab center with AMS, hypothermia, and hypotension. As per son at bedside, patient was recently admitted to the [facility] on 11/5/2019 after being found unconsciousness on the floor in his apartment. He was then found to have hyponatremia and septic due to UTI. His course was further complicated by volume overload and as result, he was diuresed. His mental status gradually improved his baseline and was discharged to rehab on 11/8/2019. At rehab, he was making good progress with physical therapy and was at his baseline mental status. However, on 11/12/2019 he became lethargic and minimally unresponsive, prompting the staff to send him to ED.   In the ED, his vitals were; /33 mmHG, T 98.1 F, Hr 78/min, RR 16 /min, SO2 96%. His labs were significant for leukocytosis fo 12.51, hypernatremia of 150, metabolic acidosis with AG of 20, lactate of 9.5, and Scr of 1.98. He was given 1L of NS and repeat lactate decreased to 8.5. His mental status gradually improved to AAOx1-2. 73 yo M with PMhx of vertebro-basilar artery syndrome, CAD s/p CABG, DMII, HTN, HLD, OA, urge incontinence, TIA, and recurrent falls presents from rehab center with AMS, hypothermia, and hypotension. As per son at bedside, patient was recently admitted to the [facility] on 11/5/2019 after being found unconsciousness on the floor in his apartment. He was then found to have hyponatremia and septic due to UTI. His course was further complicated by volume overload and as result, he was diuresed. His mental status gradually improved his baseline and was discharged to rehab on 11/8/2019. At rehab, he was making good progress with physical therapy and was at his baseline mental status. However, on 11/12/2019 he became lethargic and minimally unresponsive, prompting the staff to send him to ED.   In the ED, his vitals were; /33 mmHG, T 98.1 F, Hr 78/min, RR 16 /min, SO2 96%. His labs were significant for leukocytosis fo 12.51, hypernatremia of 150, metabolic acidosis with AG of 20, lactate of 9.5, and Scr of 1.98. He was given 1L of NS and repeat lactate decreased to 8.5. His mental status gradually improved to AAOx1-2.

## 2019-11-13 NOTE — CONSULT NOTE ADULT - PROBLEM SELECTOR RECOMMENDATION 9
Pt. with SALBADOR in the setting of hypotension, ACE-I use. On lab review of [facility] Scr at 1.25 on 3/1/18 and 0.83 on 10/1/19. Scr elevated at 1.98 on admission (11/12/19) and Scr improved at 1.54 on 11/13/19. Pt. with likely hemodynamically mediated SALBADOR. Please send urine studies (Na, K, Cl, urea, Osm, creatinine). Monitor UOP and daily weights. Avoid volume depletion, NSAIDs, ARB/ACE-I. Dose medications as per eGFR.

## 2019-11-13 NOTE — PROGRESS NOTE ADULT - ATTENDING COMMENTS
Pt admitted with severe dehydration complicated by hypernatremia, lactic acidosis, and metabolic encephalopathy. Pt reportedly was recently started on metformin; in initial conversations with Nephrology, it was thought if metformin was the etiology of severe lactic acidosis then the treatment would be urgent HD. Now after IV hydration and obtaining repeat labs showing marked improvement in lactate, HD is no longer deemed necessary at this time. Continue aggressive IV hydration and monitor BMP, VBG, lactate.

## 2019-11-13 NOTE — H&P ADULT - ASSESSMENT
73 yo M with PMhx of vertebro-basilar artery syndrome, DMII, HTN, HLD, OA, urge incontinence, TIA, and recurrent falls presents from rehab center with encephalopathy due to multifactorial etiology.

## 2019-11-13 NOTE — H&P ADULT - PROBLEM SELECTOR PLAN 3
Na elevated to 150 with findings of hypovolemia on exam in the setting of diuresis   -S/p 1 L of LR.   -Trend BMP q6 hrs. Goal Na 140-142 over 24 hrs. Na elevated to 150 with findings of hypovolemia on exam in the setting of diuresis   -S/p 1 L of LR.   -c/w IVF, Trend BMP q6 hrs.

## 2019-11-13 NOTE — CONSULT NOTE ADULT - SUBJECTIVE AND OBJECTIVE BOX
[facility] DIVISION OF KIDNEY DISEASES AND HYPERTENSION -- [phone]  -- INITIAL CONSULT NOTE  --------------------------------------------------------------------------------  HPI: 74-year-old M with medical history of vertebro-basilar artery syndrome, CAD s/p CABG, DMII, HTN, HLD, OA, urge incontinence, TIA, and recurrent falls admitted with AMS. Nephrology consulted for SALBADOR/lactic acidosis. Pt. was seen and examined at bedside. History obtain from chart review. Pt. brought from rehab due to lethargy and hypotension. Of note, pt. was on metformin and ramipril as per home medications. On lab review of [facility] Scr at 1.25 on 3/1/18 and 0.83 on 10/1/19. Scr elevated at 1.98 with elevated lactic acid at 9.5 on admission (11/12/19) and Scr improved at 1.54 and lactate level improved at 2.8 on 11/13/19.    Pt. currently unable to provide ROS, responds to verbal stimuli, answered simple questions.     PAST HISTORY  --------------------------------------------------------------------------------  PAST MEDICAL & SURGICAL HISTORY:  CAD (coronary artery disease)  Diabetes  HLD (hyperlipidemia)  HTN (hypertension)  CVA (cerebral vascular accident)  S/P CABG x 1    FAMILY HISTORY:  No pertinent family history in first degree relatives    PAST SOCIAL HISTORY:  Unable to obtain    ALLERGIES & MEDICATIONS  --------------------------------------------------------------------------------  Allergies    No Known Allergies    Intolerances      Standing Inpatient Medications  aspirin  chewable 81 milliGRAM(s) Oral daily  clopidogrel Tablet 75 milliGRAM(s) Oral daily  dextrose 5%. 1000 milliLiter(s) IV Continuous <Continuous>  dextrose 50% Injectable 12.5 Gram(s) IV Push once  dextrose 50% Injectable 25 Gram(s) IV Push once  dextrose 50% Injectable 25 Gram(s) IV Push once  gabapentin 100 milliGRAM(s) Oral daily  influenza   Vaccine 0.5 milliLiter(s) IntraMuscular once  insulin lispro (HumaLOG) corrective regimen sliding scale   SubCutaneous three times a day before meals  insulin lispro (HumaLOG) corrective regimen sliding scale   SubCutaneous at bedtime  oxybutynin 5 milliGRAM(s) Oral at bedtime  pantoprazole    Tablet 40 milliGRAM(s) Oral before breakfast  sodium chloride 0.9%. 1000 milliLiter(s) IV Continuous <Continuous>  tamsulosin 0.4 milliGRAM(s) Oral at bedtime    PRN Inpatient Medications  dextrose 40% Gel 15 Gram(s) Oral once PRN  glucagon  Injectable 1 milliGRAM(s) IntraMuscular once PRN      REVIEW OF SYSTEMS  --------------------------------------------------------------------------------  Unable to obtain    VITALS/PHYSICAL EXAM  --------------------------------------------------------------------------------  T(C): 36.6 (11-13-19 @ 10:40), Max: 36.8 (11-12-19 @ 23:03)  HR: 77 (11-13-19 @ 10:40) (73 - 83)  BP: 159/71 (11-13-19 @ 10:40) (102/57 - 159/71)  RR: 16 (11-13-19 @ 10:40) (16 - 17)  SpO2: 98% (11-13-19 @ 10:40) (93% - 98%)  Wt(kg): --  Height (cm): 152.4 (11-13-19 @ 06:37)  Weight (kg): 45.2 (11-13-19 @ 06:37)  BMI (kg/m2): 19.5 (11-13-19 @ 06:37)  BSA (m2): 1.39 (11-13-19 @ 06:37)      11-13-19 @ 07:01  -  11-13-19 @ 12:50  --------------------------------------------------------  IN: 300 mL / OUT: 0 mL / NET: 300 mL      Physical Exam:  	Gen: obtunded, arousal  	HEENT: MMM  	Pulm: CTA B/L  	CV: S1S2  	Abd: Soft, +BS   	Ext: No LE edema B/L  	Neuro: lethargic   	Skin: Warm and dry    LABS/STUDIES  --------------------------------------------------------------------------------              13.1   13.85 >-----------<  378      [11-13-19 @ 10:00]              41.5     150  |  110  |  52  ----------------------------<  197      [11-13-19 @ 10:00]  4.9   |  25  |  1.54        Ca     9.6     [11-13-19 @ 10:00]      Mg     1.9     [11-13-19 @ 10:00]      Phos  4.8     [11-13-19 @ 10:00]    TPro  7.0  /  Alb  3.7  /  TBili  0.4  /  DBili  x   /  AST  30  /  ALT  43  /  AlkPhos  98  [11-12-19 @ 22:18]    PT/INR: PT 11.8 , INR 1.03       [11-12-19 @ 22:18]  PTT: 26.8       [11-12-19 @ 22:18]    Serum Osmolality 348      [11-13-19 @ 00:30]    Creatinine Trend:  SCr 1.54 [11-13 @ 10:00]  SCr 1.98 [11-12 @ 22:18]    Urinalysis - [11-12-19 @ 22:50]      Color YELLOW / Appearance CLEAR / SG 1.020 / pH 6.0      Gluc NEGATIVE / Ketone NEGATIVE  / Bili NEGATIVE / Urobili NORMAL       Blood NEGATIVE / Protein 20 / Leuk Est NEGATIVE / Nitrite NEGATIVE      RBC 0-2 / WBC 0-2 / Hyaline NEGATIVE / Gran  / Sq Epi OCC / Non Sq Epi  / Bacteria NEGATIVE      TSH 2.38      [11-12-19 @ 22:18]

## 2019-11-13 NOTE — CONSULT NOTE ADULT - ATTENDING COMMENTS
I have personally seen, examined, and participated in the care of this patient on rounds 11/13/19 with the neurology team.  I have reviewed all pertinent clinical information, including history, physical examination, assessment and plan.   Hx as reported above.  On examination I note in particular, or in addition to or instead of as reported above:    Found asleep in bed; awakened to my voice.  Francois cooper.  Gives his age as [age] (he is 74).  Did not respond when asked where he is; given choices responded no to being at home, yes to hotel, and maybe to hospital.  Speaking in sentences.  Brisk R biceps and triceps jerks; flexor plantar responses bilat.        IMPRESSION    Toxic metabolic encephalopathy in the process of resolving as infection, electrolyte disturbances, etc., are addressed.
initially planned for HD for presumed metformin induced lacrtic acidosis. however repeat lactate level is significantly better and so is CR AND k.  CANCEL hd PLAN   start Dw5 100 cc/hr. repeat labs including VBG and lactate later around 5-6pm

## 2019-11-13 NOTE — CHART NOTE - NSCHARTNOTEFT_GEN_A_CORE
patient with renal failure and lactic acidosis. was on metformin and need urgent dialysis   family unavailable

## 2019-11-13 NOTE — H&P ADULT - NSHPREVIEWOFSYSTEMS_GEN_ALL_CORE
Unable to assess to mental status. Unable to properly assess to mental status. Patient denies any current complaints, does not know why he is in the hospital currently.

## 2019-11-13 NOTE — H&P ADULT - NSICDXPASTMEDICALHX_GEN_ALL_CORE_FT
PAST MEDICAL HISTORY:  CAD (coronary artery disease)     CVA (cerebral vascular accident)     Diabetes     HLD (hyperlipidemia)     HTN (hypertension)

## 2019-11-13 NOTE — H&P ADULT - PROBLEM SELECTOR PLAN 9
Stable   -c/w aspirin and plavix Stable   -c/w aspirin and plavix  - Noted to have an elevated hsTrop but negative repeat, patient also without any complaints of chest pain, suspect hsTrop elevated 2/2 SALBADOR/CKD

## 2019-11-13 NOTE — H&P ADULT - PROBLEM SELECTOR PLAN 10
DVT-heparin subQ  Transitions of Care Status:  1.  Name of PCP:  2.  PCP Contacted on Admission: [ ] Y    [ ] N    3.  PCP contacted at Discharge: [ ] Y    [ ] N    [ ] N/A  4.  Post-Discharge Appointment Date and Location:  5.  Summary of Handoff given to PCP: DVT-heparin subQ  Transitions of Care Status:  1.  Name of PCP: Unknown  2.  PCP Contacted on Admission: [ ] Y    [ ] N    3.  PCP contacted at Discharge: [ ] Y    [ ] N    [ ] N/A  4.  Post-Discharge Appointment Date and Location:  5.  Summary of Handoff given to PCP:

## 2019-11-13 NOTE — H&P ADULT - PROBLEM SELECTOR PLAN 1
AMS and minimally responsive to verbal commands   -DDx includes structural, infectious, and metabolic  -Patient has metabolic acidosis with elevated anion gap  and lactic acidosis in the setting of metformin use. Consult renal for further evaluation   -Moreover, pt has hypernatremia with findings of hypovolemia on physical exam most likely due to over diuresis.   -Infectious etiology is also a possibility given leukocytosis and reported hypothermia at the rehab center. Obtain blood culture and RVP   -CT head negative for any acute pathology.   -Mental status now improving after fluid repletion. Will continue to monitor AMS and minimally responsive to verbal commands   -DDx includes structural, infectious, and metabolic  -Patient has metabolic acidosis with elevated anion gap  and lactic acidosis in the setting of metformin use. Will c/w IVF and obtain renal eval (as per primary team in AM)  -Moreover, pt has hypernatremia with findings of hypovolemia on physical exam most likely due to over diuresis.   -Infectious etiology is also a possibility given leukocytosis and reported hypothermia at the rehab center. Obtain blood culture and RVP   -CT head negative for any acute pathology.   -Mental status now improving after fluid repletion. Will continue to monitor. If fails to fully recover consider MRI Brain  - Neurology eval appreciated, f/u further recs

## 2019-11-13 NOTE — H&P ADULT - ATTENDING COMMENTS
Patient seen and examined on 11/13/19, case discussed with Dr. Ana Han, agree with assessment and plan as above.

## 2019-11-13 NOTE — CHART NOTE - NSCHARTNOTEFT_GEN_A_CORE
IR Pre-Procedure Note    Patient Age:   74y    Patient Gender:   Male    Procedure (including site / side if known): Shiley cath placement    Diagnosis / Indication: Patient is a 74y old  Male who presents w/ AMS likely 2/2 metformin tox req urgent HD     Interventional Radiology Attending Physician: Dr Botello     Ordering Attending Physician: Dr Rob Peacock    PAST MEDICAL & SURGICAL HISTORY:  CAD (coronary artery disease)  Diabetes  HLD (hyperlipidemia)  HTN (hypertension)  CVA (cerebral vascular accident)  S/P CABG x 1     Vital Signs Last 24 Hrs  T(C): 36.7 (13 Nov 2019 06:37), Max: 36.8 (12 Nov 2019 23:03)  T(F): 98.1 (13 Nov 2019 06:37), Max: 98.2 (12 Nov 2019 23:03)  HR: 79 (13 Nov 2019 06:37) (73 - 83)  BP: 140/68 (13 Nov 2019 06:37) (102/57 - 147/69)  BP(mean): 89 (13 Nov 2019 01:45) (89 - 89)  RR: 16 (13 Nov 2019 06:37) (16 - 17)  SpO2: 98% (13 Nov 2019 06:37) (93% - 98%)    CAPILLARY BLOOD GLUCOSE      POCT Blood Glucose.: 149 mg/dL (13 Nov 2019 04:57)  POCT Blood Glucose.: 203 mg/dL (12 Nov 2019 21:11)      PHYSICAL EXAM:              GENERAL: Cachectic, NAD, responds to verbal commands, AAO x 1   	HEAD:  Atraumatic, Normocephalic  	EYES: Conjunctiva and sclera clear  	NECK: Supple, No JVD  	CHEST/LUNG: Clear to auscultation bilaterally; No wheeze  	HEART: Regular rate and rhythm; No murmurs, rubs, or gallops  	ABDOMEN: Soft, Nontender, Nondistended; Bowel sounds present              EXTREMITIES:  Skin tending, dry skin.    Pertinent Labs:   CBC Full  -  ( 13 Nov 2019 10:00 )  WBC Count : 13.85 K/uL  RBC Count : 4.58 M/uL  Hemoglobin : 13.1 g/dL  Hematocrit : 41.5 %  Platelet Count - Automated : 378 K/uL  Mean Cell Volume : 90.6 fL  Mean Cell Hemoglobin : 28.6 pg  Mean Cell Hemoglobin Concentration : 31.6 %  Auto Neutrophil # : 9.27 K/uL  Auto Lymphocyte # : 3.40 K/uL  Auto Monocyte # : 0.65 K/uL  Auto Eosinophil # : 0.35 K/uL  Auto Basophil # : 0.08 K/uL  Auto Neutrophil % : 67.0 %  Auto Lymphocyte % : 24.5 %  Auto Monocyte % : 4.7 %  Auto Eosinophil % : 2.5 %  Auto Basophil % : 0.6 %    11-13    150<H>  |  110<H>  |  52<H>  ----------------------------<  197<H>  4.9   |  25  |  1.54<H>    Ca    9.6      13 Nov 2019 10:00  Phos  4.8     11-13  Mg     1.9     11-13    TPro  7.0  /  Alb  3.7  /  TBili  0.4  /  DBili  x   /  AST  30  /  ALT  43<H>  /  AlkPhos  98  11-12    PT/INR - ( 12 Nov 2019 22:18 )   PT: 11.8 SEC;   INR: 1.03          PTT - ( 12 Nov 2019 22:18 )  PTT:26.8 SEC    Patient / Family aware of procedure:   [ * ] Y   [  ] N    Salma Navarro  [id number] PGY-1

## 2019-11-13 NOTE — CONSULT NOTE ADULT - SUBJECTIVE AND OBJECTIVE BOX
Neurology  Consult Note  11-13-19    Name:  MIKE GONZALEZ; 74y (1945)    Reason for Admission: Encephalopathy    73yo M with HTN, DM, HLD in ED with deteriorating mental status since 2 days, decreased appetite and PO and new facial asymmetry since 2 days.   HPI: Per the patient's son (lives in [place] who obtained information from patient's rehab home aide) patient's mental status has been deteriorating since 2 years, increased since 6 months; now acutely worsened over past 2 days; patient was able to talk prior to 2 days but at this point unable to recognize family members; patient unable to produce voice and is now using hand gestures to communicate. It was also noted that his face appeared more drooping to the right.  There is baseline weakness on right since 2008 after patient underwent 2008 double bypass surgery with poor recovery and thereafter 2nd surgery in neck 2012.  Multiple MRI done previously show chronic infarcts  Other medical conditions: + HTN; + DM; + HLD  There is associated H/O LOC 2-3 times; while walking to a restaurant 2016 and other time in his apartment 2017 and again in 2018; Thereafter patient has had an aide that comes 4-5 times a week;  Hx of stroke, heart attack  Active meds: Plavix 75mg; Ramipril 10mg; Asprirn 81mg; Amlodipine   ROS: + loss of appetite, + falls, - seizures, - LOV, + LOC, difficulty swallowing, dropping things, - fever, - cough, + rhinorrhea, - diarrhea, - vomiting, - anxiety, + depression, - slurring of voice, hearing loss, 	- back pain, - neck pain, - extremity pain  No hx alcohol consumption; + smoking  Recent ED visits: multiple visits for similar complaints; weakness and falls  Recent hospitalizations: [facility] for similar complaints    Vitals:  T(C): 36.6 (11-13-19 @ 01:45), Max: 36.8 (11-12-19 @ 23:03)  HR: 77 (11-13-19 @ 01:45) (73 - 78)  BP: 134/66 (11-13-19 @ 01:45) (102/57 - 134/66)  RR: 17 (11-13-19 @ 01:45) (16 - 17)  SpO2: 98% (11-13-19 @ 01:45) (93% - 98%)    Labs:                        12.8   12.51 )-----------( 315      ( 12 Nov 2019 22:18 )             39.9     11-12    150<H>  |  110<H>  |  59<H>  ----------------------------<  207<H>  5.6<H>   |  20<L>  |  1.98<H>    Ca    10.1      12 Nov 2019 22:18  Mg     2.3     11-12    TPro  7.0  /  Alb  3.7  /  TBili  0.4  /  DBili  x   /  AST  30  /  ALT  43<H>  /  AlkPhos  98  11-12    CAPILLARY BLOOD GLUCOSE      POCT Blood Glucose.: 203 mg/dL (12 Nov 2019 21:11)    LIVER FUNCTIONS - ( 12 Nov 2019 22:18 )  Alb: 3.7 g/dL / Pro: 7.0 g/dL / ALK PHOS: 98 u/L / ALT: 43 u/L / AST: 30 u/L / GGT: x             PT/INR - ( 12 Nov 2019 22:18 )   PT: 11.8 SEC;   INR: 1.03          PTT - ( 12 Nov 2019 22:18 )  PTT:26.8 SEC  CSF:      PHYSICAL EXAMINATION:  General: Frail build; appears drowsy  Eyes: Conjunctiva and sclera clear.  Neurologic:  - Mental Status:  arousable by speech; oriented to person; able to state he is in hospital but unable to state exact name; unable to state the date, month, year; Speech is fluent with intact naming, repetition, and comprehension; poor overall fund of knowledge; unable to assess recall; unable to spell WORLD backwards and unable to perform serial 7 subtraction; unable to read and write a sentence.  - Cranial Nerves II-XII:    II:  unable to assess visual fields; Pupils are equal, round, and reactive to light;   III, IV, VI:  Extraocular movements are intact without nystagmus.  V:  Facial sensation is intact in the V1-V3 distribution bilaterally.  VII:  mild right sided facial droop noted  VIII:  Hearing is intact to finger rub.  IX, X:  Uvula is midline and soft palate rises symmetrically  XI:  Head turning and shoulder shrug are intact.  XII:  Tongue protudes in the midline.  - Motor:  Strength is 5/5 throughout.  There is no pronator drift.  Decreased muscle bulk b/l; tone appropriate throughout.  - Reflexes:  2+ and symmetric at the biceps, triceps, brachioradialis, knees, and ankles.  Plantar responses flexor.  - Sensory:  unable to assess accurately  - Coordination:  Finger-nose-finger intact without dysmetria.  unable to assess rapid alternating hand movements intact.  - Gait:   patient not cooperating with gait testing    Radiology:  CT Head No Cont:  (12 Nov 2019 22:25)

## 2019-11-13 NOTE — PROGRESS NOTE ADULT - PROBLEM SELECTOR PLAN 10
DVT-heparin subQ  Transitions of Care Status:  1.  Name of PCP:  2.  PCP Contacted on Admission: [ ] Y    [ ] N    3.  PCP contacted at Discharge: [ ] Y    [ ] N    [ ] N/A  4.  Post-Discharge Appointment Date and Location:  5.  Summary of Handoff given to PCP: HTN  Will hold metoprolol for now in setting of hypotension      DVT PROPHYLAXIS  Heparin subq

## 2019-11-13 NOTE — PROGRESS NOTE ADULT - PROBLEM SELECTOR PLAN 4
Metabolic acidosis with high AG, lactic acid and Scr concerning for metformin toxicity.   -Nephrology consult for urgent dialysis.   -Will continue with fluid repletion Metabolic acidosis with high AG, lactic acid and Scr concerning for metformin toxicity. PH 7.31, Lactate 9.5, AG 20 on admission  -Nephrology consulted for urgent dialysis.   -continue fluid repletion per nephro  -Monitor VBGs

## 2019-11-13 NOTE — H&P ADULT - PROBLEM SELECTOR PLAN 4
Metabolic acidosis with high AG, lactic acid and Scr concerning for metformin toxicity.   -Nephrology consult for urgent dialysis.   -Will continue with fluid repletion Metabolic acidosis with high AG, lactic acid and Scr concerning for metformin toxicity.   -Nephrology consult.  -Will continue with fluid repletion  - Patient unlikely to have Type A lactic acidosis as he was never hypotensive either here or at rehab  - Consideration is given to mesenteric ischemia but low suspicion as patient is without any abd pain on examination and his lactate is improving with IVF

## 2019-11-13 NOTE — ED ADULT NURSE REASSESSMENT NOTE - NS ED NURSE REASSESS COMMENT FT1
Pt reassessed following break coverage, vss, as noted, HR NSR on CM, BP Stable, rcving IVF LR bolus as ordered.  Pt w/ 20g pIVL to R AC placed by coverage RN Alicia JACOB  Pt is drowsy, awakens to voice, brief eye opening, follows some commands but falls back asleep easily.  Respirations even, nonlabored, o2sat maintained on 2L NC.  Pt shakes head yes/no to questions, at present shakes head "no" that cannot tell me Name/.  Safety/comfort measures in place.  Family @ bedside.  Will CTM for further plan of care.

## 2019-11-13 NOTE — PROGRESS NOTE ADULT - SUBJECTIVE AND OBJECTIVE BOX
Salma Navarro MD  PGY 1 Department of Internal Medicine  Pager: [phone]    Patient is a 74y old  Male who presents with a chief complaint of AMS (2019 05:17)      SUBJECTIVE / OVERNIGHT EVENTS: Pt seen and examined. No acute overnight events.        MEDICATIONS  (STANDING):  clopidogrel Tablet 75 milliGRAM(s) Oral daily  lactated ringers. 1000 milliLiter(s) (150 mL/Hr) IV Continuous <Continuous>  oxybutynin 5 milliGRAM(s) Oral at bedtime    MEDICATIONS  (PRN):      I&O's Summary      Vital Signs Last 24 Hrs  T(C): 36.7 (2019 06:37), Max: 36.8 (2019 23:03)  T(F): 98.1 (2019 06:37), Max: 98.2 (2019 23:03)  HR: 79 (2019 06:37) (73 - 83)  BP: 140/68 (2019 06:37) (102/57 - 147/69)  BP(mean): 89 (2019 01:45) (89 - 89)  RR: 16 (2019 06:37) (16 - 17)  SpO2: 98% (2019 06:37) (93% - 98%)    CAPILLARY BLOOD GLUCOSE      POCT Blood Glucose.: 149 mg/dL (2019 04:57)  POCT Blood Glucose.: 203 mg/dL (2019 21:11)      PHYSICAL EXAM:              GENERAL: Cachectic, NAD, responds to verbal commands, awake and alert.   	HEAD:  Atraumatic, Normocephalic  	EYES: Conjunctiva and sclera clear  	NECK: Supple, No JVD  	CHEST/LUNG: Clear to auscultation bilaterally; No wheeze  	HEART: Regular rate and rhythm; No murmurs, rubs, or gallops  	ABDOMEN: Soft, Nontender, Nondistended; Bowel sounds present              EXTREMITIES:  Skin tending, dry skin.    LABS:                        12.8   12.51 )-----------( 315      ( 2019 22:18 )             39.9     Auto Eosinophil # 0.27  / Auto Eosinophil % 2.2   / Auto Neutrophil # 8.68  / Auto Neutrophil % 69.3  / BANDS % x        11-12    150<H>  |  110<H>  |  59<H>  ----------------------------<  207<H>  5.6<H>   |  20<L>  |  1.98<H>    Ca    10.1      2019 22:18  Mg     2.3       TPro  7.0  /  Alb  3.7  /  TBili  0.4  /  DBili  x   /  AST  30  /  ALT  43<H>  /  AlkPhos  98      PT/INR - ( 2019 22:18 )   PT: 11.8 SEC;   INR: 1.03          PTT - ( 2019 22:18 )  PTT:26.8 SEC      Urinalysis Basic - ( 2019 22:50 )    Color: YELLOW / Appearance: CLEAR / S.020 / pH: 6.0  Gluc: NEGATIVE / Ketone: NEGATIVE  / Bili: NEGATIVE / Urobili: NORMAL   Blood: NEGATIVE / Protein: 20 / Nitrite: NEGATIVE   Leuk Esterase: NEGATIVE / RBC: 0-2 / WBC 0-2   Sq Epi: OCC / Non Sq Epi: x / Bacteria: NEGATIVE            RADIOLOGY & ADDITIONAL TESTS:    Imaging Personally Reviewed:    Consultant(s) Notes Reviewed:      Care Discussed with Consultants/Other Providers:

## 2019-11-13 NOTE — CONSULT NOTE ADULT - PROBLEM SELECTOR RECOMMENDATION 2
Pt. with lactic acidosis in the setting of metformin use. Elevated lactic acid at 9.5 on admission (11/12/19) and lactate level improved at 2.8 on 11/13/19. No plan for HD today. Will monitor renal function and UOP. Monitor pH and lactate level.

## 2019-11-13 NOTE — H&P ADULT - PROBLEM SELECTOR PLAN 8
Stable   -CT head showed no acute pathology Stable   -CT head showed no acute pathology  - Will c/w home gabapentin but decrease dose to 100mg daily, increase to home dosing if creatinine improves

## 2019-11-13 NOTE — PROGRESS NOTE ADULT - PROBLEM SELECTOR PLAN 3
Na elevated to 150 with findings of hypovolemia on exam in the setting of diuresis   -S/p 1 L of LR.   -Trend BMP q6 hrs. Goal Na 140-142 over 24 hrs. Na elevated to 150 with findings of hypovolemia on exam in the setting of diuresis   -Serum osm 348  -S/p 1 L of LR.   -Trend BMPs q6H

## 2019-11-13 NOTE — PROGRESS NOTE ADULT - PROBLEM SELECTOR PLAN 5
Scr elevated to 2-unclear if it is SALBADOR or CKD   -urine Na, Cr, and urea   -US renal   -it could also be due to metformin toxicity   -Nephrology consulted. Scr elevated to 2-unclear if it is SALBADOR or CKD. Baseline unclear  -urine Na, Cr, and urea   -Will obtain US renal to r/o obstructive etiology   -F/u renal recs

## 2019-11-13 NOTE — PROGRESS NOTE ADULT - PROBLEM SELECTOR PLAN 9
Stable   -c/w aspirin and plavix Stable   -c/w aspirin and plavix  -Will hold metoprolol for now in setting of hypotension

## 2019-11-13 NOTE — CONSULT NOTE ADULT - ASSESSMENT
75yo M with HTN, DM, HLD in ED with deteriorating mental status since 2 days, decreased appetite and PO and new facial asymmetry since 2 days. In the setting of ongoing URI with decreased appetite, may be metabolic in nature; patient does have hypernatremic dehydration. Previous MRI shows chronic infarcts and microvascular changes.    Recommendations  - MRI non-contrast brain  - B1, b12, folic acid  - Correct underlying metabolic disorder

## 2019-11-13 NOTE — PROVIDER CONTACT NOTE (CRITICAL VALUE NOTIFICATION) - ACTION/TREATMENT ORDERED:
MD made aware, will continue with same fluids, and will continue to monitor pt's blood work, and his condition.

## 2019-11-13 NOTE — H&P ADULT - NSHPSOCIALHISTORY_GEN_ALL_CORE
He lives on his own. He has a aide at home. He is AAOx3 at baseline. Denies any smoking or alcohol consumptions. Prior to his recent hospitalizations he lived on his own. He had a aide at home. He is AAOx3 at baseline. Denies any smoking or alcohol consumptions.

## 2019-11-13 NOTE — PROGRESS NOTE ADULT - PROBLEM SELECTOR PLAN 2
Leukocytosis, reported hypothermia concerning for infectious process.   -No clear infectious source   -Will send Bcx and RVP  -Monitor off abx Leukocytosis, reported hypothermia at rehab center concerning for infection  -No clear infectious source at this time  -CXR negative  -F/u Bcx and RVP  -Monitor off abx

## 2019-11-13 NOTE — H&P ADULT - NSHPPHYSICALEXAM_GEN_ALL_CORE
GENERAL: Cachectic, NAD, responds to verbal commands, awake and alert.   HEAD:  Atraumatic, Normocephalic  EYES: Conjunctiva and sclera clear  NECK: Supple, No JVD  CHEST/LUNG: Clear to auscultation bilaterally; No wheeze  HEART: Regular rate and rhythm; No murmurs, rubs, or gallops  ABDOMEN: Soft, Nontender, Nondistended; Bowel sounds present  EXTREMITIES:  Skin tending, dry skin. Vital Signs Last 24 Hrs  T(C): 36.7 (13 Nov 2019 06:37), Max: 36.8 (12 Nov 2019 23:03)  T(F): 98.1 (13 Nov 2019 06:37), Max: 98.2 (12 Nov 2019 23:03)  HR: 79 (13 Nov 2019 06:37) (73 - 83)  BP: 140/68 (13 Nov 2019 06:37) (102/57 - 147/69)  BP(mean): 89 (13 Nov 2019 01:45) (89 - 89)  RR: 16 (13 Nov 2019 06:37) (16 - 17)  SpO2: 98% (13 Nov 2019 06:37) (93% - 98%)    GENERAL: Cachectic, NAD, responds to verbal commands, awake and alert but confused on details, unable to state why he is at the hospital, unable to recall details from prior few days.   EYES: Conjunctiva and sclera clear  ENT: dry mucosal membranes, no nasal discharge  NECK: Supple, No JVD  CHEST/LUNG: Clear to auscultation bilaterally; No wheeze  HEART: Regular rate and rhythm; No murmurs, rubs, or gallops  ABDOMEN: Soft, Nontender, Nondistended; Bowel sounds present  PSYCH: Nl behavior, nl affect  NEUROLOGY: AAOx1-2 (oriented to self, to hospital but not which one), non-focal neuro otherwise  EXTREMITIES: No LE edema; +Skin tenting, dry skin.

## 2019-11-13 NOTE — H&P ADULT - PROBLEM SELECTOR PLAN 2
Leukocytosis, reported hypothermia concerning for infectious process.   -No clear infectious source   -Will send Bcx and RVP  -Monitor off abx Leukocytosis, reported hypothermia concerning for infectious process.   -No clear infectious source   -Will send Bcx and RVP, will also check CT C/A/P to eval for possible infectious cause (cannot given IV contrast at present 2/2 SALBADOR/CKD)  -Monitor off abx

## 2019-11-13 NOTE — PROGRESS NOTE ADULT - PROBLEM SELECTOR PLAN 1
AMS and minimally responsive to verbal commands   -DDx includes structural, infectious, and metabolic  -Patient has metabolic acidosis with elevated anion gap  and lactic acidosis in the setting of metformin use. Consult renal for further evaluation   -Moreover, pt has hypernatremia with findings of hypovolemia on physical exam most likely due to over diuresis.   -Infectious etiology is also a possibility given leukocytosis and reported hypothermia at the rehab center. Obtain blood culture and RVP   -CT head negative for any acute pathology.   -Mental status now improving after fluid repletion. Will continue to monitor AMS and minimally responsive to verbal commands   -DDx includes structural, infectious, and metabolic. Metabolic acidosis with elevated anion gap  and lactic acidosis in the setting of metformin use. Also w/ hypernatremia with findings of hypovolemia on physical exam likely 2/2 diuresis. Infectious etiology  also a possibility given leukocytosis and reported hypothermia at rehab center.   -Check urine osmolality   -Free water deficit 0.9L. Will start NS 1L @ 100cc /hr x 24 hrs  -F/u blood culture and RVP   -CT head negative for any acute pathology.   -CXR negative   -Will continue to monitor

## 2019-11-14 LAB
ALBUMIN SERPL ELPH-MCNC: 3.4 G/DL — SIGNIFICANT CHANGE UP (ref 3.3–5)
ALP SERPL-CCNC: 98 U/L — SIGNIFICANT CHANGE UP (ref 40–120)
ALT FLD-CCNC: 45 U/L — HIGH (ref 4–41)
ANION GAP SERPL CALC-SCNC: 11 MMO/L — SIGNIFICANT CHANGE UP (ref 7–14)
ANION GAP SERPL CALC-SCNC: 12 MMO/L — SIGNIFICANT CHANGE UP (ref 7–14)
AST SERPL-CCNC: 22 U/L — SIGNIFICANT CHANGE UP (ref 4–40)
BACTERIA UR CULT: SIGNIFICANT CHANGE UP
BASE EXCESS BLDV CALC-SCNC: 2.5 MMOL/L — SIGNIFICANT CHANGE UP
BASE EXCESS BLDV CALC-SCNC: 3.1 MMOL/L — SIGNIFICANT CHANGE UP
BILIRUB SERPL-MCNC: 0.8 MG/DL — SIGNIFICANT CHANGE UP (ref 0.2–1.2)
BLOOD GAS VENOUS - CREATININE: 1 MG/DL — SIGNIFICANT CHANGE UP (ref 0.5–1.3)
BUN SERPL-MCNC: 28 MG/DL — HIGH (ref 7–23)
BUN SERPL-MCNC: 37 MG/DL — HIGH (ref 7–23)
CALCIUM SERPL-MCNC: 8.8 MG/DL — SIGNIFICANT CHANGE UP (ref 8.4–10.5)
CALCIUM SERPL-MCNC: 8.9 MG/DL — SIGNIFICANT CHANGE UP (ref 8.4–10.5)
CHLORIDE BLDV-SCNC: 103 MMOL/L — SIGNIFICANT CHANGE UP (ref 96–108)
CHLORIDE SERPL-SCNC: 102 MMOL/L — SIGNIFICANT CHANGE UP (ref 98–107)
CHLORIDE SERPL-SCNC: 99 MMOL/L — SIGNIFICANT CHANGE UP (ref 98–107)
CO2 SERPL-SCNC: 24 MMOL/L — SIGNIFICANT CHANGE UP (ref 22–31)
CO2 SERPL-SCNC: 25 MMOL/L — SIGNIFICANT CHANGE UP (ref 22–31)
CREAT SERPL-MCNC: 1.06 MG/DL — SIGNIFICANT CHANGE UP (ref 0.5–1.3)
CREAT SERPL-MCNC: 1.19 MG/DL — SIGNIFICANT CHANGE UP (ref 0.5–1.3)
GAS PNL BLDV: 137 MMOL/L — SIGNIFICANT CHANGE UP (ref 136–146)
GAS PNL BLDV: 139 MMOL/L — SIGNIFICANT CHANGE UP (ref 136–146)
GLUCOSE BLDC GLUCOMTR-MCNC: 133 MG/DL — HIGH (ref 70–99)
GLUCOSE BLDC GLUCOMTR-MCNC: 150 MG/DL — HIGH (ref 70–99)
GLUCOSE BLDC GLUCOMTR-MCNC: 206 MG/DL — HIGH (ref 70–99)
GLUCOSE BLDC GLUCOMTR-MCNC: 250 MG/DL — HIGH (ref 70–99)
GLUCOSE BLDV-MCNC: 248 MG/DL — HIGH (ref 70–99)
GLUCOSE BLDV-MCNC: 289 MG/DL — HIGH (ref 70–99)
GLUCOSE SERPL-MCNC: 250 MG/DL — HIGH (ref 70–99)
GLUCOSE SERPL-MCNC: 308 MG/DL — HIGH (ref 70–99)
HBA1C BLD-MCNC: 7.1 % — HIGH (ref 4–5.6)
HCO3 BLDV-SCNC: 26 MMOL/L — SIGNIFICANT CHANGE UP (ref 20–27)
HCO3 BLDV-SCNC: 27 MMOL/L — SIGNIFICANT CHANGE UP (ref 20–27)
HCT VFR BLD CALC: 36.5 % — LOW (ref 39–50)
HCT VFR BLDV CALC: 37 % — LOW (ref 39–51)
HCT VFR BLDV CALC: 39.5 % — SIGNIFICANT CHANGE UP (ref 39–51)
HCV AB S/CO SERPL IA: 0.2 S/CO — SIGNIFICANT CHANGE UP (ref 0–0.99)
HCV AB SERPL-IMP: SIGNIFICANT CHANGE UP
HGB BLD-MCNC: 11.8 G/DL — LOW (ref 13–17)
HGB BLDV-MCNC: 12 G/DL — LOW (ref 13–17)
HGB BLDV-MCNC: 12.9 G/DL — LOW (ref 13–17)
LACTATE BLDV-MCNC: 2.7 MMOL/L — HIGH (ref 0.5–2)
LACTATE BLDV-MCNC: 3.1 MMOL/L — HIGH (ref 0.5–2)
MAGNESIUM SERPL-MCNC: 1.5 MG/DL — LOW (ref 1.6–2.6)
MCHC RBC-ENTMCNC: 27.7 PG — SIGNIFICANT CHANGE UP (ref 27–34)
MCHC RBC-ENTMCNC: 32.3 % — SIGNIFICANT CHANGE UP (ref 32–36)
MCV RBC AUTO: 85.7 FL — SIGNIFICANT CHANGE UP (ref 80–100)
NRBC # FLD: 0 K/UL — SIGNIFICANT CHANGE UP (ref 0–0)
PCO2 BLDV: 41 MMHG — SIGNIFICANT CHANGE UP (ref 41–51)
PCO2 BLDV: 43 MMHG — SIGNIFICANT CHANGE UP (ref 41–51)
PH BLDV: 7.41 PH — SIGNIFICANT CHANGE UP (ref 7.32–7.43)
PH BLDV: 7.44 PH — HIGH (ref 7.32–7.43)
PHOSPHATE SERPL-MCNC: 2.6 MG/DL — SIGNIFICANT CHANGE UP (ref 2.5–4.5)
PLATELET # BLD AUTO: 349 K/UL — SIGNIFICANT CHANGE UP (ref 150–400)
PMV BLD: 10.8 FL — SIGNIFICANT CHANGE UP (ref 7–13)
PO2 BLDV: 134 MMHG — HIGH (ref 35–40)
PO2 BLDV: 50 MMHG — HIGH (ref 35–40)
POTASSIUM BLDV-SCNC: 3.2 MMOL/L — LOW (ref 3.4–4.5)
POTASSIUM BLDV-SCNC: 4 MMOL/L — SIGNIFICANT CHANGE UP (ref 3.4–4.5)
POTASSIUM SERPL-MCNC: 3.4 MMOL/L — LOW (ref 3.5–5.3)
POTASSIUM SERPL-MCNC: 4.1 MMOL/L — SIGNIFICANT CHANGE UP (ref 3.5–5.3)
POTASSIUM SERPL-SCNC: 3.4 MMOL/L — LOW (ref 3.5–5.3)
POTASSIUM SERPL-SCNC: 4.1 MMOL/L — SIGNIFICANT CHANGE UP (ref 3.5–5.3)
PROT SERPL-MCNC: 6.2 G/DL — SIGNIFICANT CHANGE UP (ref 6–8.3)
RBC # BLD: 4.26 M/UL — SIGNIFICANT CHANGE UP (ref 4.2–5.8)
RBC # FLD: 13.1 % — SIGNIFICANT CHANGE UP (ref 10.3–14.5)
SAO2 % BLDV: 81.4 % — SIGNIFICANT CHANGE UP (ref 60–85)
SAO2 % BLDV: 97.8 % — HIGH (ref 60–85)
SODIUM SERPL-SCNC: 134 MMOL/L — LOW (ref 135–145)
SODIUM SERPL-SCNC: 139 MMOL/L — SIGNIFICANT CHANGE UP (ref 135–145)
SPECIMEN SOURCE: SIGNIFICANT CHANGE UP
WBC # BLD: 12.35 K/UL — HIGH (ref 3.8–10.5)
WBC # FLD AUTO: 12.35 K/UL — HIGH (ref 3.8–10.5)

## 2019-11-14 PROCEDURE — 99232 SBSQ HOSP IP/OBS MODERATE 35: CPT | Mod: GC

## 2019-11-14 PROCEDURE — 99233 SBSQ HOSP IP/OBS HIGH 50: CPT | Mod: GC

## 2019-11-14 RX ORDER — LISINOPRIL 2.5 MG/1
40 TABLET ORAL DAILY
Refills: 0 | Status: DISCONTINUED | OUTPATIENT
Start: 2019-11-14 | End: 2019-11-14

## 2019-11-14 RX ORDER — SENNA PLUS 8.6 MG/1
2 TABLET ORAL AT BEDTIME
Refills: 0 | Status: DISCONTINUED | OUTPATIENT
Start: 2019-11-14 | End: 2019-11-20

## 2019-11-14 RX ORDER — ATORVASTATIN CALCIUM 80 MG/1
10 TABLET, FILM COATED ORAL AT BEDTIME
Refills: 0 | Status: DISCONTINUED | OUTPATIENT
Start: 2019-11-14 | End: 2019-11-20

## 2019-11-14 RX ORDER — INSULIN LISPRO 100/ML
VIAL (ML) SUBCUTANEOUS
Refills: 0 | Status: DISCONTINUED | OUTPATIENT
Start: 2019-11-14 | End: 2019-11-20

## 2019-11-14 RX ORDER — POTASSIUM CHLORIDE 20 MEQ
40 PACKET (EA) ORAL ONCE
Refills: 0 | Status: COMPLETED | OUTPATIENT
Start: 2019-11-14 | End: 2019-11-14

## 2019-11-14 RX ORDER — MAGNESIUM SULFATE 500 MG/ML
1 VIAL (ML) INJECTION ONCE
Refills: 0 | Status: COMPLETED | OUTPATIENT
Start: 2019-11-14 | End: 2019-11-14

## 2019-11-14 RX ORDER — NIFEDIPINE 30 MG
90 TABLET, EXTENDED RELEASE 24 HR ORAL DAILY
Refills: 0 | Status: DISCONTINUED | OUTPATIENT
Start: 2019-11-14 | End: 2019-11-20

## 2019-11-14 RX ORDER — LISINOPRIL 2.5 MG/1
40 TABLET ORAL DAILY
Refills: 0 | Status: DISCONTINUED | OUTPATIENT
Start: 2019-11-14 | End: 2019-11-20

## 2019-11-14 RX ORDER — INSULIN LISPRO 100/ML
2 VIAL (ML) SUBCUTANEOUS
Refills: 0 | Status: DISCONTINUED | OUTPATIENT
Start: 2019-11-14 | End: 2019-11-18

## 2019-11-14 RX ORDER — NIFEDIPINE 30 MG
90 TABLET, EXTENDED RELEASE 24 HR ORAL DAILY
Refills: 0 | Status: DISCONTINUED | OUTPATIENT
Start: 2019-11-14 | End: 2019-11-14

## 2019-11-14 RX ORDER — INSULIN GLARGINE 100 [IU]/ML
7 INJECTION, SOLUTION SUBCUTANEOUS AT BEDTIME
Refills: 0 | Status: DISCONTINUED | OUTPATIENT
Start: 2019-11-14 | End: 2019-11-16

## 2019-11-14 RX ORDER — METOPROLOL TARTRATE 50 MG
25 TABLET ORAL EVERY 12 HOURS
Refills: 0 | Status: DISCONTINUED | OUTPATIENT
Start: 2019-11-14 | End: 2019-11-20

## 2019-11-14 RX ORDER — METOPROLOL TARTRATE 50 MG
25 TABLET ORAL EVERY 12 HOURS
Refills: 0 | Status: DISCONTINUED | OUTPATIENT
Start: 2019-11-14 | End: 2019-11-14

## 2019-11-14 RX ORDER — INSULIN LISPRO 100/ML
VIAL (ML) SUBCUTANEOUS AT BEDTIME
Refills: 0 | Status: DISCONTINUED | OUTPATIENT
Start: 2019-11-14 | End: 2019-11-20

## 2019-11-14 RX ADMIN — SENNA PLUS 2 TABLET(S): 8.6 TABLET ORAL at 22:23

## 2019-11-14 RX ADMIN — LISINOPRIL 40 MILLIGRAM(S): 2.5 TABLET ORAL at 09:44

## 2019-11-14 RX ADMIN — CLOPIDOGREL BISULFATE 75 MILLIGRAM(S): 75 TABLET, FILM COATED ORAL at 12:40

## 2019-11-14 RX ADMIN — TAMSULOSIN HYDROCHLORIDE 0.4 MILLIGRAM(S): 0.4 CAPSULE ORAL at 22:23

## 2019-11-14 RX ADMIN — Medication 2 UNIT(S): at 18:20

## 2019-11-14 RX ADMIN — Medication 100 GRAM(S): at 09:43

## 2019-11-14 RX ADMIN — Medication 90 MILLIGRAM(S): at 12:40

## 2019-11-14 RX ADMIN — Medication 25 MILLIGRAM(S): at 18:41

## 2019-11-14 RX ADMIN — ATORVASTATIN CALCIUM 10 MILLIGRAM(S): 80 TABLET, FILM COATED ORAL at 22:26

## 2019-11-14 RX ADMIN — Medication 2: at 13:08

## 2019-11-14 RX ADMIN — Medication 81 MILLIGRAM(S): at 12:40

## 2019-11-14 RX ADMIN — PANTOPRAZOLE SODIUM 40 MILLIGRAM(S): 20 TABLET, DELAYED RELEASE ORAL at 06:28

## 2019-11-14 RX ADMIN — Medication 40 MILLIEQUIVALENT(S): at 09:44

## 2019-11-14 RX ADMIN — INSULIN GLARGINE 7 UNIT(S): 100 INJECTION, SOLUTION SUBCUTANEOUS at 22:26

## 2019-11-14 RX ADMIN — Medication 5 MILLIGRAM(S): at 22:23

## 2019-11-14 RX ADMIN — GABAPENTIN 100 MILLIGRAM(S): 400 CAPSULE ORAL at 12:40

## 2019-11-14 NOTE — PROGRESS NOTE ADULT - PROBLEM SELECTOR PLAN 2
Leukocytosis, reported hypothermia at rehab center concerning for infection  -No clear infectious source at this time  -CXR negative  -F/u Bcx and RVP  -Monitor off abx Leukocytosis, reported hypothermia at rehab center concerning for infection  -No clear infectious source at this time. Afebrile  -CXR negative  -Blood and urine culture NGTD  -Monitor off abx

## 2019-11-14 NOTE — PROGRESS NOTE ADULT - ATTENDING COMMENTS
Dehydration and lactic acidosis markedly improved with IV fluids. At baseline, pt remains malnourished. Encourage oral hydration and diet at this time, in addition to PT.

## 2019-11-14 NOTE — PROGRESS NOTE ADULT - SUBJECTIVE AND OBJECTIVE BOX
Salma Navarro MD  PGY 1 Department of Internal Medicine  Pager: [phone]    Patient is a 74y old  Male who presents with a chief complaint of AMS (2019 12:50)      SUBJECTIVE / OVERNIGHT EVENTS: Pt seen and examined. No acute overnight events.        MEDICATIONS  (STANDING):  aspirin  chewable 81 milliGRAM(s) Oral daily  clopidogrel Tablet 75 milliGRAM(s) Oral daily  dextrose 5%. 1000 milliLiter(s) (100 mL/Hr) IV Continuous <Continuous>  dextrose 5%. 1000 milliLiter(s) (50 mL/Hr) IV Continuous <Continuous>  dextrose 50% Injectable 12.5 Gram(s) IV Push once  dextrose 50% Injectable 25 Gram(s) IV Push once  dextrose 50% Injectable 25 Gram(s) IV Push once  gabapentin 100 milliGRAM(s) Oral daily  influenza   Vaccine 0.5 milliLiter(s) IntraMuscular once  insulin glargine Injectable (LANTUS) 5 Unit(s) SubCutaneous at bedtime  insulin lispro (HumaLOG) corrective regimen sliding scale   SubCutaneous three times a day before meals  insulin lispro (HumaLOG) corrective regimen sliding scale   SubCutaneous at bedtime  oxybutynin 5 milliGRAM(s) Oral at bedtime  pantoprazole    Tablet 40 milliGRAM(s) Oral before breakfast  tamsulosin 0.4 milliGRAM(s) Oral at bedtime    MEDICATIONS  (PRN):  dextrose 40% Gel 15 Gram(s) Oral once PRN Blood Glucose LESS THAN 70 milliGRAM(s)/deciliter  glucagon  Injectable 1 milliGRAM(s) IntraMuscular once PRN Glucose LESS THAN 70 milligrams/deciliter      I&O's Summary    2019 07:01  -  2019 07:00  --------------------------------------------------------  IN: 300 mL / OUT: 900 mL / NET: -600 mL        Vital Signs Last 24 Hrs  T(C): 36.4 (2019 06:58), Max: 36.6 (2019 10:40)  T(F): 97.6 (2019 06:58), Max: 97.8 (2019 10:40)  HR: 72 (2019 06:58) (72 - 77)  BP: 145/76 (2019 06:58) (145/76 - 159/71)  BP(mean): --  RR: 18 (2019 06:58) (16 - 18)  SpO2: 98% (2019 06:58) (98% - 100%)    CAPILLARY BLOOD GLUCOSE      POCT Blood Glucose.: 294 mg/dL (2019 22:33)  POCT Blood Glucose.: 332 mg/dL (2019 18:14)  POCT Blood Glucose.: 163 mg/dL (2019 12:52)  POCT Blood Glucose.: 171 mg/dL (2019 09:10)      PHYSICAL EXAM:              GENERAL: Cachectic, NAD, responds to verbal commands, awake and alert but confused on details, unable to state why he is at the hospital, unable to recall details from prior few days.   	EYES: Conjunctiva and sclera clear  	ENT: dry mucosal membranes, no nasal discharge  	NECK: Supple, No JVD  	CHEST/LUNG: Clear to auscultation bilaterally; No wheeze  	HEART: Regular rate and rhythm; No murmurs, rubs, or gallops  	ABDOMEN: Soft, Nontender, Nondistended; Bowel sounds present  	PSYCH: Nl behavior, nl affect  	NEUROLOGY: AAOx1-2 (oriented to self, to hospital but not which one), non-focal neuro otherwise              EXTREMITIES: No LE edema; +Skin tenting, dry skin.    LABS:                        13.1   13.85 )-----------( 378      ( 2019 10:00 )             41.5     Auto Eosinophil # 0.35  / Auto Eosinophil % 2.5   / Auto Neutrophil # 9.27  / Auto Neutrophil % 67.0  / BANDS % x                            12.8   12.51 )-----------( 315      ( 2019 22:18 )             39.9     Auto Eosinophil # 0.27  / Auto Eosinophil % 2.2   / Auto Neutrophil # 8.68  / Auto Neutrophil % 69.3  / BANDS % x        11-14    139  |  102  |  37<H>  ----------------------------<  308<H>  4.1   |  25  |  1.19  11-13    142  |  107  |  43<H>  ----------------------------<  371<H>  Test not performed SPECIMEN GROSSLY HEMOLYZED   |  18<L>  |  1.33<H>      150<H>  |  110<H>  |  52<H>  ----------------------------<  197<H>  4.9   |  25  |  1.54<H>    Ca    8.9      2019 01:20  Mg     1.7       Phos  3.9       TPro  7.0  /  Alb  3.7  /  TBili  0.4  /  DBili  x   /  AST  30  /  ALT  43<H>  /  AlkPhos  98      PT/INR - ( 2019 22:18 )   PT: 11.8 SEC;   INR: 1.03          PTT - ( 2019 22:18 )  PTT:26.8 SEC      Urinalysis Basic - ( 2019 22:50 )    Color: YELLOW / Appearance: CLEAR / S.020 / pH: 6.0  Gluc: NEGATIVE / Ketone: NEGATIVE  / Bili: NEGATIVE / Urobili: NORMAL   Blood: NEGATIVE / Protein: 20 / Nitrite: NEGATIVE   Leuk Esterase: NEGATIVE / RBC: 0-2 / WBC 0-2   Sq Epi: OCC / Non Sq Epi: x / Bacteria: NEGATIVE      Lactate, Blood: 2.8 mmol/L ( @ 10:00)        RADIOLOGY & ADDITIONAL TESTS:    Imaging Personally Reviewed:    Consultant(s) Notes Reviewed:      Care Discussed with Consultants/Other Providers: Salma Navarro MD  PGY 1 Department of Internal Medicine  Pager: [phone]    Patient is a 74y old  Male who presents with a chief complaint of AMS (2019 12:50)      SUBJECTIVE / OVERNIGHT EVENTS: Pt seen and examined. No acute overnight events. This AM noted to have clearer thought process. AAOx 2. Denies fevers, chills, N/V, CP, SOB, Constipation, diarrhea         MEDICATIONS  (STANDING):  aspirin  chewable 81 milliGRAM(s) Oral daily  clopidogrel Tablet 75 milliGRAM(s) Oral daily  dextrose 5%. 1000 milliLiter(s) (100 mL/Hr) IV Continuous <Continuous>  dextrose 5%. 1000 milliLiter(s) (50 mL/Hr) IV Continuous <Continuous>  dextrose 50% Injectable 12.5 Gram(s) IV Push once  dextrose 50% Injectable 25 Gram(s) IV Push once  dextrose 50% Injectable 25 Gram(s) IV Push once  gabapentin 100 milliGRAM(s) Oral daily  influenza   Vaccine 0.5 milliLiter(s) IntraMuscular once  insulin glargine Injectable (LANTUS) 5 Unit(s) SubCutaneous at bedtime  insulin lispro (HumaLOG) corrective regimen sliding scale   SubCutaneous three times a day before meals  insulin lispro (HumaLOG) corrective regimen sliding scale   SubCutaneous at bedtime  oxybutynin 5 milliGRAM(s) Oral at bedtime  pantoprazole    Tablet 40 milliGRAM(s) Oral before breakfast  tamsulosin 0.4 milliGRAM(s) Oral at bedtime    MEDICATIONS  (PRN):  dextrose 40% Gel 15 Gram(s) Oral once PRN Blood Glucose LESS THAN 70 milliGRAM(s)/deciliter  glucagon  Injectable 1 milliGRAM(s) IntraMuscular once PRN Glucose LESS THAN 70 milligrams/deciliter      I&O's Summary    2019 07:01  -  2019 07:00  --------------------------------------------------------  IN: 300 mL / OUT: 900 mL / NET: -600 mL        Vital Signs Last 24 Hrs  T(C): 36.4 (2019 06:58), Max: 36.6 (2019 10:40)  T(F): 97.6 (2019 06:58), Max: 97.8 (2019 10:40)  HR: 72 (2019 06:58) (72 - 77)  BP: 145/76 (2019 06:58) (145/76 - 159/71)  BP(mean): --  RR: 18 (2019 06:58) (16 - 18)  SpO2: 98% (2019 06:58) (98% - 100%)    CAPILLARY BLOOD GLUCOSE      POCT Blood Glucose.: 294 mg/dL (2019 22:33)  POCT Blood Glucose.: 332 mg/dL (2019 18:14)  POCT Blood Glucose.: 163 mg/dL (2019 12:52)  POCT Blood Glucose.: 171 mg/dL (2019 09:10)      PHYSICAL EXAM:              GENERAL: Cachectic, NAD, responds to verbal commands  	EYES: Conjunctiva and sclera clear  	ENT: dry mucosal membranes, no nasal discharge  	NECK: Supple, No JVD  	CHEST/LUNG: Clear to auscultation bilaterally; No wheeze  	HEART: Regular rate and rhythm; No murmurs, rubs, or gallops  	ABDOMEN: Soft, Nontender, Nondistended; Bowel sounds present  	PSYCH: Nl behavior, nl affect  	NEUROLOGY: AAOx2 (self and place)              EXTREMITIES: No LE edema; +Skin tenting, dry skin.    LABS:                        13.1   13.85 )-----------( 378      ( 2019 10:00 )             41.5     Auto Eosinophil # 0.35  / Auto Eosinophil % 2.5   / Auto Neutrophil # 9.27  / Auto Neutrophil % 67.0  / BANDS % x                            12.8   12.51 )-----------( 315      ( 2019 22:18 )             39.9     Auto Eosinophil # 0.27  / Auto Eosinophil % 2.2   / Auto Neutrophil # 8.68  / Auto Neutrophil % 69.3  / BANDS % x        11-14    139  |  102  |  37<H>  ----------------------------<  308<H>  4.1   |  25  |  1.19  11-13    142  |  107  |  43<H>  ----------------------------<  371<H>  Test not performed SPECIMEN GROSSLY HEMOLYZED   |  18<L>  |  1.33<H>      150<H>  |  110<H>  |  52<H>  ----------------------------<  197<H>  4.9   |  25  |  1.54<H>    Ca    8.9      2019 01:20  Mg     1.7       Phos  3.9       TPro  7.0  /  Alb  3.7  /  TBili  0.4  /  DBili  x   /  AST  30  /  ALT  43<H>  /  AlkPhos  98      PT/INR - ( 2019 22:18 )   PT: 11.8 SEC;   INR: 1.03          PTT - ( 2019 22:18 )  PTT:26.8 SEC      Urinalysis Basic - ( 2019 22:50 )    Color: YELLOW / Appearance: CLEAR / S.020 / pH: 6.0  Gluc: NEGATIVE / Ketone: NEGATIVE  / Bili: NEGATIVE / Urobili: NORMAL   Blood: NEGATIVE / Protein: 20 / Nitrite: NEGATIVE   Leuk Esterase: NEGATIVE / RBC: 0-2 / WBC 0-2   Sq Epi: OCC / Non Sq Epi: x / Bacteria: NEGATIVE      Lactate, Blood: 2.8 mmol/L ( @ 10:00)        RADIOLOGY & ADDITIONAL TESTS:    Imaging Personally Reviewed:    Consultant(s) Notes Reviewed:      Care Discussed with Consultants/Other Providers:

## 2019-11-14 NOTE — PHYSICAL THERAPY INITIAL EVALUATION ADULT - PATIENT PROFILE REVIEW, REHAB EVAL
yes/activity order - ambulate as tolerated with rolling walker , out of bed to chair , OK to perform PT evaluation as per ALDAIR Trevino

## 2019-11-14 NOTE — PROGRESS NOTE ADULT - PROBLEM SELECTOR PLAN 2
Pt. with lactic acidosis in the setting of metformin use. Elevated lactic acid at 9.5 on admission (11/12/19) and lactate level improved at 2.7 on 11/14/19. No plan for HD today. Monitor renal function and UOP. Monitor pH and lactate level.

## 2019-11-14 NOTE — PROGRESS NOTE ADULT - SUBJECTIVE AND OBJECTIVE BOX
[facility] DIVISION OF KIDNEY DISEASES AND HYPERTENSION -- FOLLOW UP NOTE  --------------------------------------------------------------------------------  Chief Complaint: SALBADOR    24 hour events/subjective: Pt. was seen and examined at bedside, feels tired otherwise offers no complains.     PAST HISTORY  --------------------------------------------------------------------------------  No significant changes to PMH, PSH, FHx, SHx, unless otherwise noted    ALLERGIES & MEDICATIONS  --------------------------------------------------------------------------------  Allergies    No Known Allergies    Intolerances      Standing Inpatient Medications  aspirin  chewable 81 milliGRAM(s) Oral daily  atorvastatin 10 milliGRAM(s) Oral at bedtime  clopidogrel Tablet 75 milliGRAM(s) Oral daily  dextrose 5%. 1000 milliLiter(s) IV Continuous <Continuous>  dextrose 50% Injectable 12.5 Gram(s) IV Push once  dextrose 50% Injectable 25 Gram(s) IV Push once  dextrose 50% Injectable 25 Gram(s) IV Push once  gabapentin 100 milliGRAM(s) Oral daily  influenza   Vaccine 0.5 milliLiter(s) IntraMuscular once  insulin glargine Injectable (LANTUS) 5 Unit(s) SubCutaneous at bedtime  insulin lispro (HumaLOG) corrective regimen sliding scale   SubCutaneous three times a day before meals  insulin lispro (HumaLOG) corrective regimen sliding scale   SubCutaneous at bedtime  lisinopril 40 milliGRAM(s) Oral daily  metoprolol tartrate 25 milliGRAM(s) Oral every 12 hours  NIFEdipine XL 90 milliGRAM(s) Oral daily  oxybutynin 5 milliGRAM(s) Oral at bedtime  pantoprazole    Tablet 40 milliGRAM(s) Oral before breakfast  tamsulosin 0.4 milliGRAM(s) Oral at bedtime    PRN Inpatient Medications  dextrose 40% Gel 15 Gram(s) Oral once PRN  glucagon  Injectable 1 milliGRAM(s) IntraMuscular once PRN      REVIEW OF SYSTEMS  --------------------------------------------------------------------------------  Gen: No fevers/chills  Skin: No rashes  Head/Eyes/Ears: Normal hearing,   Respiratory: No dyspnea, cough  CV: No chest pain  GI: No abdominal pain, diarrhea  : No dysuria, hematuria  MSK: No  edema  Heme: No easy bruising or bleeding  Psych: No significant depression      All other systems were reviewed and are negative, except as noted.    VITALS/PHYSICAL EXAM  --------------------------------------------------------------------------------  T(C): 36.4 (11-14-19 @ 06:58), Max: 36.4 (11-14-19 @ 06:58)  HR: 72 (11-14-19 @ 09:41) (72 - 74)  BP: 136/65 (11-14-19 @ 09:41) (136/65 - 155/81)  RR: 18 (11-14-19 @ 09:41) (18 - 18)  SpO2: 100% (11-14-19 @ 09:41) (98% - 100%)  Wt(kg): --  Height (cm): 152.4 (11-13-19 @ 06:37)  Weight (kg): 45.2 (11-13-19 @ 06:37)  BMI (kg/m2): 19.5 (11-13-19 @ 06:37)  BSA (m2): 1.39 (11-13-19 @ 06:37)      11-13-19 @ 07:01  -  11-14-19 @ 07:00  --------------------------------------------------------  IN: 300 mL / OUT: 900 mL / NET: -600 mL        Physical Exam:  	Gen: NAD, elderly, fragile  	HEENT: MMM  	Pulm: CTA B/L  	CV: S1S2  	Abd: Soft, +BS   	Ext: No LE edema B/L  	Neuro: Awake  	Skin: Warm and dry      LABS/STUDIES  --------------------------------------------------------------------------------              11.8   12.35 >-----------<  349      [11-14-19 @ 07:30]              36.5     134  |  99  |  28  ----------------------------<  250      [11-14-19 @ 07:30]  3.4   |  24  |  1.06        Ca     8.8     [11-14-19 @ 07:30]      Mg     1.5     [11-14-19 @ 07:30]      Phos  2.6     [11-14-19 @ 07:30]    TPro  6.2  /  Alb  3.4  /  TBili  0.8  /  DBili  x   /  AST  22  /  ALT  45  /  AlkPhos  98  [11-14-19 @ 07:30]    PT/INR: PT 11.8 , INR 1.03       [11-12-19 @ 22:18]  PTT: 26.8       [11-12-19 @ 22:18]    Serum Osmolality 348      [11-13-19 @ 00:30]    Creatinine Trend:  SCr 1.06 [11-14 @ 07:30]  SCr 1.19 [11-14 @ 01:20]  SCr 1.33 [11-13 @ 17:20]  SCr 1.54 [11-13 @ 10:00]  SCr 1.98 [11-12 @ 22:18]    Urinalysis - [11-12-19 @ 22:50]      Color YELLOW / Appearance CLEAR / SG 1.020 / pH 6.0      Gluc NEGATIVE / Ketone NEGATIVE  / Bili NEGATIVE / Urobili NORMAL       Blood NEGATIVE / Protein 20 / Leuk Est NEGATIVE / Nitrite NEGATIVE      RBC 0-2 / WBC 0-2 / Hyaline NEGATIVE / Gran  / Sq Epi OCC / Non Sq Epi  / Bacteria NEGATIVE    Urine Sodium 135      [11-13-19 @ 14:56]  Urine Osmolality 482      [11-13-19 @ 14:56]    TSH 2.38      [11-12-19 @ 22:18]

## 2019-11-14 NOTE — PHYSICAL THERAPY INITIAL EVALUATION ADULT - GENERAL OBSERVATIONS, REHAB EVAL
Patient received semi supine in bed ,when assisted to sit at the edge of bed , patient c/o feeling dizzy, RN Belinda made aware.

## 2019-11-14 NOTE — PHYSICAL THERAPY INITIAL EVALUATION ADULT - LIVES WITH, PROFILE
came from Rehab facility upon this admission  , prior to that lives alone , has Home Health Aide services.

## 2019-11-14 NOTE — PROGRESS NOTE ADULT - PROBLEM SELECTOR PLAN 1
Pt. with SALBADOR in the setting of hypotension, ACE-I use. On lab review of [facility] Scr at 1.25 on 3/1/18 and 0.83 on 10/1/19. Scr elevated at 1.98 on admission (11/12/19) and Scr improved at 1.0.6 on 11/14/19. Pt. with likely hemodynamically mediated SALBADOR, now resolved. Monitor UOP and daily weights. Avoid volume depletion, NSAIDs, ARB/ACE-I. Dose medications as per eGFR. Will discontinue follow up today, please reconsult PRN.

## 2019-11-14 NOTE — PROGRESS NOTE ADULT - PROBLEM SELECTOR PLAN 6
ISS and FS   -Hold levemir 10 units for now Uncontrolled on ISS  -Levemir 10U at home  -Will start 7U Lantus and 2U pre-meals and increase ISS to moderate

## 2019-11-14 NOTE — PROGRESS NOTE ADULT - ATTENDING COMMENTS
Sunny Villanueva MD, FACP.  Attending Nephrologist  Office: [phone]  Pager: [phone]  Email: [email or web address]

## 2019-11-14 NOTE — PROGRESS NOTE ADULT - PROBLEM SELECTOR PLAN 10
HTN  Will hold metoprolol for now in setting of hypotension      DVT PROPHYLAXIS  Heparin subq HTN  c/w metoprolol       DVT PROPHYLAXIS  Heparin subq

## 2019-11-14 NOTE — PHYSICAL THERAPY INITIAL EVALUATION ADULT - PERTINENT HX OF CURRENT PROBLEM, REHAB EVAL
This is a 73 yo M with PMhx of vertebro-basilar artery syndrome, DMII, urge incontinence, TIA, and recurrent falls presents from rehab center with encephalopathy due to multifactorial etiology.

## 2019-11-14 NOTE — PROGRESS NOTE ADULT - PROBLEM SELECTOR PLAN 3
Na elevated to 150 with findings of hypovolemia on exam in the setting of diuresis   -Serum osm 348  -S/p 1 L of LR.   -Trend BMPs q6H On admission, pt w/ metabolic acidosis with high AG, lactic acid and Scr concerning for metformin toxicity. PH 7.31, Lactate 9.5, AG 20 on admission  -Acidosis now resolved pt however still w/ elevated lactate  -continue fluid repletion as needed   -Monitor VBGs

## 2019-11-14 NOTE — PROGRESS NOTE ADULT - PROBLEM SELECTOR PLAN 9
Stable   -c/w aspirin and plavix  -Will hold metoprolol for now in setting of hypotension Stable   -c/w aspirin and plavix  -c/w metoprolol

## 2019-11-14 NOTE — PROGRESS NOTE ADULT - PROBLEM SELECTOR PLAN 4
Metabolic acidosis with high AG, lactic acid and Scr concerning for metformin toxicity. PH 7.31, Lactate 9.5, AG 20 on admission  -Nephrology consulted for urgent dialysis.   -continue fluid repletion per nephro  -Monitor VBGs -Now resolved

## 2019-11-14 NOTE — PROGRESS NOTE ADULT - PROBLEM SELECTOR PLAN 1
AMS and minimally responsive to verbal commands   -DDx includes structural, infectious, and metabolic. Metabolic acidosis with elevated anion gap  and lactic acidosis in the setting of metformin use. Also w/ hypernatremia with findings of hypovolemia on physical exam likely 2/2 diuresis. Infectious etiology  also a possibility given leukocytosis and reported hypothermia at rehab center.   -Check urine osmolality   -Free water deficit 0.9L. Will start NS 1L @ 100cc /hr x 24 hrs  -F/u blood culture and RVP   -CT head negative for any acute pathology.   -CXR negative   -Will continue to monitor Likely 2/2 metabolic encephalopathy/ lactic acidosis in the setting of metformin use. Also w/ hypernatremia with findings of hypovolemia on physical exam likely 2/2 diuresis.   -AMS now resolving  -Hypernatremia now resolved   -Acidosis now resolved. However still w/ elevated lactate   -Blood and urine culture NGTD   -CT head negative for any acute pathology.   -CXR negative   -Will continue to monitor

## 2019-11-14 NOTE — PROGRESS NOTE ADULT - PROBLEM SELECTOR PLAN 5
Scr elevated to 2-unclear if it is SALBADOR or CKD. Baseline unclear  -urine Na, Cr, and urea   -Will obtain US renal to r/o obstructive etiology   -F/u renal recs Scr elevated to 2-unclear if it is SALBADOR or CKD. Baseline unclear  -SALBADOR now resolved  -US renal sig for increased bilateral cortical echogenicity c/f renal disease. Will benefit from further outpt management   -F/u renal recs

## 2019-11-15 LAB
ALBUMIN SERPL ELPH-MCNC: 3.6 G/DL — SIGNIFICANT CHANGE UP (ref 3.3–5)
ALP SERPL-CCNC: 109 U/L — SIGNIFICANT CHANGE UP (ref 40–120)
ALT FLD-CCNC: 46 U/L — HIGH (ref 4–41)
ANION GAP SERPL CALC-SCNC: 12 MMO/L — SIGNIFICANT CHANGE UP (ref 7–14)
AST SERPL-CCNC: 21 U/L — SIGNIFICANT CHANGE UP (ref 4–40)
BASE EXCESS BLDV CALC-SCNC: 1 MMOL/L — SIGNIFICANT CHANGE UP
BASOPHILS # BLD AUTO: 0.04 K/UL — SIGNIFICANT CHANGE UP (ref 0–0.2)
BASOPHILS NFR BLD AUTO: 0.3 % — SIGNIFICANT CHANGE UP (ref 0–2)
BILIRUB SERPL-MCNC: 0.7 MG/DL — SIGNIFICANT CHANGE UP (ref 0.2–1.2)
BLOOD GAS VENOUS - CREATININE: 1.27 MG/DL — SIGNIFICANT CHANGE UP (ref 0.5–1.3)
BUN SERPL-MCNC: 20 MG/DL — SIGNIFICANT CHANGE UP (ref 7–23)
CALCIUM SERPL-MCNC: 9.2 MG/DL — SIGNIFICANT CHANGE UP (ref 8.4–10.5)
CHLORIDE BLDV-SCNC: 105 MMOL/L — SIGNIFICANT CHANGE UP (ref 96–108)
CHLORIDE SERPL-SCNC: 101 MMOL/L — SIGNIFICANT CHANGE UP (ref 98–107)
CO2 SERPL-SCNC: 24 MMOL/L — SIGNIFICANT CHANGE UP (ref 22–31)
CREAT SERPL-MCNC: 1.04 MG/DL — SIGNIFICANT CHANGE UP (ref 0.5–1.3)
EOSINOPHIL # BLD AUTO: 0.19 K/UL — SIGNIFICANT CHANGE UP (ref 0–0.5)
EOSINOPHIL NFR BLD AUTO: 1.2 % — SIGNIFICANT CHANGE UP (ref 0–6)
GAS PNL BLDV: 138 MMOL/L — SIGNIFICANT CHANGE UP (ref 136–146)
GLUCOSE BLDC GLUCOMTR-MCNC: 105 MG/DL — HIGH (ref 70–99)
GLUCOSE BLDC GLUCOMTR-MCNC: 180 MG/DL — HIGH (ref 70–99)
GLUCOSE BLDC GLUCOMTR-MCNC: 249 MG/DL — HIGH (ref 70–99)
GLUCOSE BLDC GLUCOMTR-MCNC: 87 MG/DL — SIGNIFICANT CHANGE UP (ref 70–99)
GLUCOSE BLDV-MCNC: 191 MG/DL — HIGH (ref 70–99)
GLUCOSE SERPL-MCNC: 194 MG/DL — HIGH (ref 70–99)
HBA1C BLD-MCNC: 7.4 % — HIGH (ref 4–5.6)
HCO3 BLDV-SCNC: 25 MMOL/L — SIGNIFICANT CHANGE UP (ref 20–27)
HCT VFR BLD CALC: 38.8 % — LOW (ref 39–50)
HCT VFR BLDV CALC: 40.2 % — SIGNIFICANT CHANGE UP (ref 39–51)
HGB BLD-MCNC: 12.6 G/DL — LOW (ref 13–17)
HGB BLDV-MCNC: 13.1 G/DL — SIGNIFICANT CHANGE UP (ref 13–17)
IMM GRANULOCYTES NFR BLD AUTO: 0.6 % — SIGNIFICANT CHANGE UP (ref 0–1.5)
LACTATE BLDV-MCNC: 2.6 MMOL/L — HIGH (ref 0.5–2)
LYMPHOCYTES # BLD AUTO: 1.79 K/UL — SIGNIFICANT CHANGE UP (ref 1–3.3)
LYMPHOCYTES # BLD AUTO: 11.3 % — LOW (ref 13–44)
MAGNESIUM SERPL-MCNC: 1.6 MG/DL — SIGNIFICANT CHANGE UP (ref 1.6–2.6)
MCHC RBC-ENTMCNC: 28.4 PG — SIGNIFICANT CHANGE UP (ref 27–34)
MCHC RBC-ENTMCNC: 32.5 % — SIGNIFICANT CHANGE UP (ref 32–36)
MCV RBC AUTO: 87.4 FL — SIGNIFICANT CHANGE UP (ref 80–100)
MONOCYTES # BLD AUTO: 0.78 K/UL — SIGNIFICANT CHANGE UP (ref 0–0.9)
MONOCYTES NFR BLD AUTO: 4.9 % — SIGNIFICANT CHANGE UP (ref 2–14)
NEUTROPHILS # BLD AUTO: 12.97 K/UL — HIGH (ref 1.8–7.4)
NEUTROPHILS NFR BLD AUTO: 81.7 % — HIGH (ref 43–77)
NRBC # FLD: 0 K/UL — SIGNIFICANT CHANGE UP (ref 0–0)
PCO2 BLDV: 42 MMHG — SIGNIFICANT CHANGE UP (ref 41–51)
PH BLDV: 7.4 PH — SIGNIFICANT CHANGE UP (ref 7.32–7.43)
PHOSPHATE SERPL-MCNC: 2.5 MG/DL — SIGNIFICANT CHANGE UP (ref 2.5–4.5)
PLATELET # BLD AUTO: 351 K/UL — SIGNIFICANT CHANGE UP (ref 150–400)
PMV BLD: 11.4 FL — SIGNIFICANT CHANGE UP (ref 7–13)
PO2 BLDV: 43 MMHG — HIGH (ref 35–40)
POTASSIUM BLDV-SCNC: 4.1 MMOL/L — SIGNIFICANT CHANGE UP (ref 3.4–4.5)
POTASSIUM SERPL-MCNC: 4.4 MMOL/L — SIGNIFICANT CHANGE UP (ref 3.5–5.3)
POTASSIUM SERPL-SCNC: 4.4 MMOL/L — SIGNIFICANT CHANGE UP (ref 3.5–5.3)
PROT SERPL-MCNC: 6.5 G/DL — SIGNIFICANT CHANGE UP (ref 6–8.3)
RBC # BLD: 4.44 M/UL — SIGNIFICANT CHANGE UP (ref 4.2–5.8)
RBC # FLD: 13.1 % — SIGNIFICANT CHANGE UP (ref 10.3–14.5)
SAO2 % BLDV: 72.6 % — SIGNIFICANT CHANGE UP (ref 60–85)
SODIUM SERPL-SCNC: 137 MMOL/L — SIGNIFICANT CHANGE UP (ref 135–145)
WBC # BLD: 15.87 K/UL — HIGH (ref 3.8–10.5)
WBC # FLD AUTO: 15.87 K/UL — HIGH (ref 3.8–10.5)

## 2019-11-15 PROCEDURE — 99233 SBSQ HOSP IP/OBS HIGH 50: CPT | Mod: GC

## 2019-11-15 RX ORDER — QUETIAPINE FUMARATE 200 MG/1
12.5 TABLET, FILM COATED ORAL ONCE
Refills: 0 | Status: COMPLETED | OUTPATIENT
Start: 2019-11-15 | End: 2019-11-15

## 2019-11-15 RX ORDER — SODIUM CHLORIDE 9 MG/ML
1000 INJECTION INTRAMUSCULAR; INTRAVENOUS; SUBCUTANEOUS
Refills: 0 | Status: DISCONTINUED | OUTPATIENT
Start: 2019-11-15 | End: 2019-11-17

## 2019-11-15 RX ORDER — ACETAMINOPHEN 500 MG
650 TABLET ORAL EVERY 6 HOURS
Refills: 0 | Status: DISCONTINUED | OUTPATIENT
Start: 2019-11-15 | End: 2019-11-20

## 2019-11-15 RX ADMIN — LISINOPRIL 40 MILLIGRAM(S): 2.5 TABLET ORAL at 05:14

## 2019-11-15 RX ADMIN — SENNA PLUS 2 TABLET(S): 8.6 TABLET ORAL at 22:19

## 2019-11-15 RX ADMIN — Medication 81 MILLIGRAM(S): at 11:36

## 2019-11-15 RX ADMIN — Medication 5 MILLIGRAM(S): at 22:19

## 2019-11-15 RX ADMIN — Medication 90 MILLIGRAM(S): at 05:14

## 2019-11-15 RX ADMIN — CLOPIDOGREL BISULFATE 75 MILLIGRAM(S): 75 TABLET, FILM COATED ORAL at 11:35

## 2019-11-15 RX ADMIN — INSULIN GLARGINE 7 UNIT(S): 100 INJECTION, SOLUTION SUBCUTANEOUS at 22:45

## 2019-11-15 RX ADMIN — PANTOPRAZOLE SODIUM 40 MILLIGRAM(S): 20 TABLET, DELAYED RELEASE ORAL at 06:06

## 2019-11-15 RX ADMIN — Medication 2: at 09:17

## 2019-11-15 RX ADMIN — TAMSULOSIN HYDROCHLORIDE 0.4 MILLIGRAM(S): 0.4 CAPSULE ORAL at 22:19

## 2019-11-15 RX ADMIN — Medication 25 MILLIGRAM(S): at 05:14

## 2019-11-15 RX ADMIN — ATORVASTATIN CALCIUM 10 MILLIGRAM(S): 80 TABLET, FILM COATED ORAL at 22:19

## 2019-11-15 RX ADMIN — Medication 650 MILLIGRAM(S): at 11:33

## 2019-11-15 RX ADMIN — Medication 2 UNIT(S): at 18:19

## 2019-11-15 RX ADMIN — SODIUM CHLORIDE 75 MILLILITER(S): 9 INJECTION INTRAMUSCULAR; INTRAVENOUS; SUBCUTANEOUS at 15:25

## 2019-11-15 RX ADMIN — Medication 2 UNIT(S): at 09:17

## 2019-11-15 RX ADMIN — QUETIAPINE FUMARATE 12.5 MILLIGRAM(S): 200 TABLET, FILM COATED ORAL at 23:40

## 2019-11-15 RX ADMIN — GABAPENTIN 100 MILLIGRAM(S): 400 CAPSULE ORAL at 11:35

## 2019-11-15 RX ADMIN — Medication 4: at 18:18

## 2019-11-15 RX ADMIN — Medication 25 MILLIGRAM(S): at 18:20

## 2019-11-15 RX ADMIN — Medication 650 MILLIGRAM(S): at 12:25

## 2019-11-15 NOTE — PROGRESS NOTE ADULT - ATTENDING COMMENTS
No focal symptoms today but pt is still weak and malnourished. F/u Nutrition consult, continue glucerna, encourage family to bring in food from home. Resume IV hydration and monitor BMP and lactate (which remains slightly elevated). F/u PT consult. If worsening symptoms or fever, low threshold to check blood cultures and start empiric abx.

## 2019-11-15 NOTE — DIETITIAN INITIAL EVALUATION ADULT. - OTHER INFO
75 y/o male admitted with encephalopathy and lactic acidosis. Pt quite confused and unable to participate in nutrition interview. RN reports that pt is not eating well and is being resistant to drinking ordered nutrition supplement. Pt appears cachectic with evidence of severe subcutaneous fat store depletion and severe muscle mass wasting placing him at severe risk of malnutrition. If pt is unable to consume adequate oral PO to meet his estimated nutrition needs, alternate means of nutrition support may need to be considered.

## 2019-11-15 NOTE — DIETITIAN INITIAL EVALUATION ADULT. - PERTINENT MEDS FT
MEDICATIONS  (STANDING):  aspirin  chewable 81 milliGRAM(s) Oral daily  atorvastatin 10 milliGRAM(s) Oral at bedtime  clopidogrel Tablet 75 milliGRAM(s) Oral daily  dextrose 5%. 1000 milliLiter(s) (50 mL/Hr) IV Continuous <Continuous>  dextrose 50% Injectable 12.5 Gram(s) IV Push once  dextrose 50% Injectable 25 Gram(s) IV Push once  dextrose 50% Injectable 25 Gram(s) IV Push once  gabapentin 100 milliGRAM(s) Oral daily  influenza   Vaccine 0.5 milliLiter(s) IntraMuscular once  insulin glargine Injectable (LANTUS) 7 Unit(s) SubCutaneous at bedtime  insulin lispro (HumaLOG) corrective regimen sliding scale   SubCutaneous three times a day before meals  insulin lispro (HumaLOG) corrective regimen sliding scale   SubCutaneous at bedtime  insulin lispro Injectable (HumaLOG) 2 Unit(s) SubCutaneous three times a day before meals  lisinopril 40 milliGRAM(s) Oral daily  metoprolol tartrate 25 milliGRAM(s) Oral every 12 hours  NIFEdipine XL 90 milliGRAM(s) Oral daily  oxybutynin 5 milliGRAM(s) Oral at bedtime  pantoprazole    Tablet 40 milliGRAM(s) Oral before breakfast  senna 2 Tablet(s) Oral at bedtime  tamsulosin 0.4 milliGRAM(s) Oral at bedtime

## 2019-11-15 NOTE — PROGRESS NOTE ADULT - SUBJECTIVE AND OBJECTIVE BOX
Salma Navarro MD  PGY 1 Department of Internal Medicine  Pager: [phone]    Patient is a 74y old  Male who presents with a chief complaint of AMS (14 Nov 2019 11:21)      SUBJECTIVE / OVERNIGHT EVENTS: Pt seen and examined. No acute overnight events.        MEDICATIONS  (STANDING):  aspirin  chewable 81 milliGRAM(s) Oral daily  atorvastatin 10 milliGRAM(s) Oral at bedtime  clopidogrel Tablet 75 milliGRAM(s) Oral daily  dextrose 5%. 1000 milliLiter(s) (50 mL/Hr) IV Continuous <Continuous>  dextrose 50% Injectable 12.5 Gram(s) IV Push once  dextrose 50% Injectable 25 Gram(s) IV Push once  dextrose 50% Injectable 25 Gram(s) IV Push once  gabapentin 100 milliGRAM(s) Oral daily  influenza   Vaccine 0.5 milliLiter(s) IntraMuscular once  insulin glargine Injectable (LANTUS) 7 Unit(s) SubCutaneous at bedtime  insulin lispro (HumaLOG) corrective regimen sliding scale   SubCutaneous three times a day before meals  insulin lispro (HumaLOG) corrective regimen sliding scale   SubCutaneous at bedtime  insulin lispro Injectable (HumaLOG) 2 Unit(s) SubCutaneous three times a day before meals  lisinopril 40 milliGRAM(s) Oral daily  metoprolol tartrate 25 milliGRAM(s) Oral every 12 hours  NIFEdipine XL 90 milliGRAM(s) Oral daily  oxybutynin 5 milliGRAM(s) Oral at bedtime  pantoprazole    Tablet 40 milliGRAM(s) Oral before breakfast  senna 2 Tablet(s) Oral at bedtime  tamsulosin 0.4 milliGRAM(s) Oral at bedtime    MEDICATIONS  (PRN):  dextrose 40% Gel 15 Gram(s) Oral once PRN Blood Glucose LESS THAN 70 milliGRAM(s)/deciliter  glucagon  Injectable 1 milliGRAM(s) IntraMuscular once PRN Glucose LESS THAN 70 milligrams/deciliter      I&O's Summary    14 Nov 2019 07:01  -  15 Nov 2019 07:00  --------------------------------------------------------  IN: 200 mL / OUT: 200 mL / NET: 0 mL        Vital Signs Last 24 Hrs  T(C): 36.8 (15 Nov 2019 05:13), Max: 36.8 (15 Nov 2019 05:13)  T(F): 98.2 (15 Nov 2019 05:13), Max: 98.2 (15 Nov 2019 05:13)  HR: 86 (15 Nov 2019 05:13) (70 - 86)  BP: 137/61 (15 Nov 2019 05:13) (118/79 - 137/61)  BP(mean): --  RR: 18 (15 Nov 2019 05:13) (16 - 18)  SpO2: 96% (15 Nov 2019 05:13) (96% - 100%)    CAPILLARY BLOOD GLUCOSE      POCT Blood Glucose.: 206 mg/dL (14 Nov 2019 22:12)  POCT Blood Glucose.: 133 mg/dL (14 Nov 2019 18:01)  POCT Blood Glucose.: 250 mg/dL (14 Nov 2019 13:03)  POCT Blood Glucose.: 150 mg/dL (14 Nov 2019 09:01)      PHYSICAL EXAM:   GENERAL: Cachectic, NAD, responds to verbal commands  	EYES: Conjunctiva and sclera clear  	ENT: dry mucosal membranes, no nasal discharge  	NECK: Supple, No JVD  	CHEST/LUNG: Clear to auscultation bilaterally; No wheeze  	HEART: Regular rate and rhythm; No murmurs, rubs, or gallops  	ABDOMEN: Soft, Nontender, Nondistended; Bowel sounds present  	PSYCH: Nl behavior, nl affect  	NEUROLOGY: AAOx2 (self and place)              EXTREMITIES: No LE edema; +Skin tenting, dry skin.         LABS:                        11.8   12.35 )-----------( 349      ( 14 Nov 2019 07:30 )             36.5     Auto Eosinophil # x     / Auto Eosinophil % x     / Auto Neutrophil # x     / Auto Neutrophil % x     / BANDS % x                            13.1   13.85 )-----------( 378      ( 13 Nov 2019 10:00 )             41.5     Auto Eosinophil # 0.35  / Auto Eosinophil % 2.5   / Auto Neutrophil # 9.27  / Auto Neutrophil % 67.0  / BANDS % x        11-14    134<L>  |  99  |  28<H>  ----------------------------<  250<H>  3.4<L>   |  24  |  1.06  11-14    139  |  102  |  37<H>  ----------------------------<  308<H>  4.1   |  25  |  1.19  11-13    142  |  107  |  43<H>  ----------------------------<  371<H>  Test not performed SPECIMEN GROSSLY HEMOLYZED   |  18<L>  |  1.33<H>    Ca    8.8      14 Nov 2019 07:30  Mg     1.5     11-14  Phos  2.6     11-14  TPro  6.2  /  Alb  3.4  /  TBili  0.8  /  DBili  x   /  AST  22  /  ALT  45<H>  /  AlkPhos  98  11-14            Lactate, Blood: 2.8 mmol/L (11-13 @ 10:00)        RADIOLOGY & ADDITIONAL TESTS:    Imaging Personally Reviewed:    Consultant(s) Notes Reviewed:      Care Discussed with Consultants/Other Providers: Salma Navarro MD  PGY 1 Department of Internal Medicine  Pager: [phone]    Patient is a 74y old  Male who presents with a chief complaint of AMS (14 Nov 2019 11:21)      SUBJECTIVE / OVERNIGHT EVENTS: Pt seen and examined. No acute overnight events. More confused this AM. Per RN pt intermittently confused overnight.         MEDICATIONS  (STANDING):  aspirin  chewable 81 milliGRAM(s) Oral daily  atorvastatin 10 milliGRAM(s) Oral at bedtime  clopidogrel Tablet 75 milliGRAM(s) Oral daily  dextrose 5%. 1000 milliLiter(s) (50 mL/Hr) IV Continuous <Continuous>  dextrose 50% Injectable 12.5 Gram(s) IV Push once  dextrose 50% Injectable 25 Gram(s) IV Push once  dextrose 50% Injectable 25 Gram(s) IV Push once  gabapentin 100 milliGRAM(s) Oral daily  influenza   Vaccine 0.5 milliLiter(s) IntraMuscular once  insulin glargine Injectable (LANTUS) 7 Unit(s) SubCutaneous at bedtime  insulin lispro (HumaLOG) corrective regimen sliding scale   SubCutaneous three times a day before meals  insulin lispro (HumaLOG) corrective regimen sliding scale   SubCutaneous at bedtime  insulin lispro Injectable (HumaLOG) 2 Unit(s) SubCutaneous three times a day before meals  lisinopril 40 milliGRAM(s) Oral daily  metoprolol tartrate 25 milliGRAM(s) Oral every 12 hours  NIFEdipine XL 90 milliGRAM(s) Oral daily  oxybutynin 5 milliGRAM(s) Oral at bedtime  pantoprazole    Tablet 40 milliGRAM(s) Oral before breakfast  senna 2 Tablet(s) Oral at bedtime  tamsulosin 0.4 milliGRAM(s) Oral at bedtime    MEDICATIONS  (PRN):  dextrose 40% Gel 15 Gram(s) Oral once PRN Blood Glucose LESS THAN 70 milliGRAM(s)/deciliter  glucagon  Injectable 1 milliGRAM(s) IntraMuscular once PRN Glucose LESS THAN 70 milligrams/deciliter      I&O's Summary    14 Nov 2019 07:01  -  15 Nov 2019 07:00  --------------------------------------------------------  IN: 200 mL / OUT: 200 mL / NET: 0 mL        Vital Signs Last 24 Hrs  T(C): 36.8 (15 Nov 2019 05:13), Max: 36.8 (15 Nov 2019 05:13)  T(F): 98.2 (15 Nov 2019 05:13), Max: 98.2 (15 Nov 2019 05:13)  HR: 86 (15 Nov 2019 05:13) (70 - 86)  BP: 137/61 (15 Nov 2019 05:13) (118/79 - 137/61)  BP(mean): --  RR: 18 (15 Nov 2019 05:13) (16 - 18)  SpO2: 96% (15 Nov 2019 05:13) (96% - 100%)    CAPILLARY BLOOD GLUCOSE      POCT Blood Glucose.: 206 mg/dL (14 Nov 2019 22:12)  POCT Blood Glucose.: 133 mg/dL (14 Nov 2019 18:01)  POCT Blood Glucose.: 250 mg/dL (14 Nov 2019 13:03)  POCT Blood Glucose.: 150 mg/dL (14 Nov 2019 09:01)      PHYSICAL EXAM:   GENERAL: Cachectic, NAD, responds to verbal commands  	EYES: Conjunctiva and sclera clear  	ENT: dry mucosal membranes, no nasal discharge  	NECK: Supple, No JVD  	CHEST/LUNG: Clear to auscultation bilaterally; No wheeze  	HEART: Regular rate and rhythm; No murmurs, rubs, or gallops  	ABDOMEN: Soft, Nontender, Nondistended; Bowel sounds present  	PSYCH: Nl behavior, nl affect  	NEUROLOGY: AAOx2 (self and place)              EXTREMITIES: No LE edema; +Skin tenting, dry skin.         LABS:                        11.8   12.35 )-----------( 349      ( 14 Nov 2019 07:30 )             36.5     Auto Eosinophil # x     / Auto Eosinophil % x     / Auto Neutrophil # x     / Auto Neutrophil % x     / BANDS % x                            13.1   13.85 )-----------( 378      ( 13 Nov 2019 10:00 )             41.5     Auto Eosinophil # 0.35  / Auto Eosinophil % 2.5   / Auto Neutrophil # 9.27  / Auto Neutrophil % 67.0  / BANDS % x        11-14    134<L>  |  99  |  28<H>  ----------------------------<  250<H>  3.4<L>   |  24  |  1.06  11-14    139  |  102  |  37<H>  ----------------------------<  308<H>  4.1   |  25  |  1.19  11-13    142  |  107  |  43<H>  ----------------------------<  371<H>  Test not performed SPECIMEN GROSSLY HEMOLYZED   |  18<L>  |  1.33<H>    Ca    8.8      14 Nov 2019 07:30  Mg     1.5     11-14  Phos  2.6     11-14  TPro  6.2  /  Alb  3.4  /  TBili  0.8  /  DBili  x   /  AST  22  /  ALT  45<H>  /  AlkPhos  98  11-14            Lactate, Blood: 2.8 mmol/L (11-13 @ 10:00)        RADIOLOGY & ADDITIONAL TESTS:    Imaging Personally Reviewed:    Consultant(s) Notes Reviewed:      Care Discussed with Consultants/Other Providers:

## 2019-11-15 NOTE — PROGRESS NOTE ADULT - PROBLEM SELECTOR PLAN 3
On admission, pt w/ metabolic acidosis with high AG, lactic acid and Scr concerning for metformin toxicity. PH 7.31, Lactate 9.5, AG 20 on admission  -Acidosis now resolved pt however still w/ elevated lactate  -continue fluid repletion as needed   -Monitor VBGs

## 2019-11-15 NOTE — PROGRESS NOTE ADULT - PROBLEM SELECTOR PLAN 2
Leukocytosis, reported hypothermia at rehab center concerning for infection  -No clear infectious source at this time. Afebrile  -CXR negative  -Blood and urine culture NGTD  -Monitor off abx

## 2019-11-15 NOTE — PROGRESS NOTE ADULT - PROBLEM SELECTOR PLAN 1
Likely 2/2 metabolic encephalopathy/ lactic acidosis in the setting of metformin use. Also w/ hypernatremia with findings of hypovolemia on physical exam likely 2/2 diuresis.   -AMS now resolving  -Hypernatremia now resolved   -Acidosis now resolved. However still w/ elevated lactate   -Blood and urine culture NGTD   -CT head negative for any acute pathology.   -CXR negative   -Will continue to monitor Likely 2/2 metabolic encephalopathy/ lactic acidosis in the setting of metformin use. Also w/ hypernatremia with findings of hypovolemia on physical exam likely 2/2 diuresis.   -AMS now resolving  -Hypernatremia now resolved   -Acidosis now resolved. However still w/ elevated lactate   -Blood and urine culture NGTD   -CT head negative for any acute pathology.   -CXR negative   -Will continue to monitor  -Will give gentle hydration NS 75cc x 15 hours

## 2019-11-15 NOTE — DIETITIAN INITIAL EVALUATION ADULT. - PERTINENT LABORATORY DATA
11-15 Na 137 mmol/L Glu 194 mg/dL<H> K+ 4.4 mmol/L Cr 1.04 mg/dL BUN 20 mg/dL Phos 2.5 mg/dL  11-15-19 HbA1c 7.4 %  11-14-19 HbA1c 7.1 %  11-15 @ 12:32 POCT 87 mg/dL  11-15 @ 09:00 POCT 180 mg/dL  11-14 @ 22:12 POCT 206 mg/dL  11-14 @ 18:01 POCT 133 mg/dL  11-14 @ 13:03 POCT 250 mg/dL

## 2019-11-15 NOTE — PROGRESS NOTE ADULT - PROBLEM SELECTOR PLAN 6
Uncontrolled on ISS  -Levemir 10U at home  -Will start 7U Lantus and 2U pre-meals and increase ISS to moderate Uncontrolled on ISS  -Levemir 10U at home  -Will continue 7U Lantus and 2U pre-meals  -c/w moderate ISS

## 2019-11-15 NOTE — DIETITIAN INITIAL EVALUATION ADULT. - ADD RECOMMEND
1). Encourage and monitor oral intake and consider different nutrition supplement if PO intake remains poor. 2). If oral intake remains suboptimal, consider alternate means of nutrition support. 3). Monitor weights, labs, BM's, skin integrity and edema. 4). Follow pt as per protocol.

## 2019-11-15 NOTE — DIETITIAN INITIAL EVALUATION ADULT. - PHYSICAL APPEARANCE
other (specify)/emaciated Nutrition focused physical exam conducted - found signs of malnutrition present:  Subcutaneous fat loss: [moderate ] Orbital fat pads region, [moderate ]Buccal fat region, [severe ]Triceps region  Muscle wasting: [moderate ]Temples region, [severe ]Clavicle region, [ severe]Shoulder region, [severe ]thigh region, [severe ]Calf region

## 2019-11-15 NOTE — CHART NOTE - NSCHARTNOTEFT_GEN_A_CORE
NUTRITION SERVICES     Upon Nutritional Assessment by the Registered Dietitian your patient was determined to meet criteria/ has evidence of the following diagnosis/diagnoses:  [ ] Mild Protein Calorie Malnutrition   [ ] Moderate Protein Calorie Malnutrition   [x ] Severe Protein Calorie Malnutrition     Findings as based on:  •  Comprehensive nutritional assessment and consultation    Etiology: in the context of chronic illness.     Signs/Symptoms: Severe subcutaneous fat store depletion; Severe muscle mass wasting.       Please refer to Initial Dietitian Evaluation via documents section of [facility] EMR for further recommendations.

## 2019-11-15 NOTE — PROGRESS NOTE ADULT - PROBLEM SELECTOR PLAN 5
Scr elevated to 2-unclear if it is SALBADOR or CKD. Baseline unclear  -SALBADOR now resolved  -US renal sig for increased bilateral cortical echogenicity c/f renal disease. Will benefit from further outpt management   -F/u renal recs

## 2019-11-15 NOTE — PROGRESS NOTE ADULT - ASSESSMENT
75 yo M with PMhx of vertebro-basilar artery syndrome, DMII, HTN, HLD, OA, urge incontinence, TIA, and recurrent falls presents from rehab center with encephalopathy due to multifactorial etiology.

## 2019-11-16 LAB
ALBUMIN SERPL ELPH-MCNC: 3.3 G/DL — SIGNIFICANT CHANGE UP (ref 3.3–5)
ALP SERPL-CCNC: 105 U/L — SIGNIFICANT CHANGE UP (ref 40–120)
ALT FLD-CCNC: 37 U/L — SIGNIFICANT CHANGE UP (ref 4–41)
ANION GAP SERPL CALC-SCNC: 13 MMO/L — SIGNIFICANT CHANGE UP (ref 7–14)
AST SERPL-CCNC: 18 U/L — SIGNIFICANT CHANGE UP (ref 4–40)
BASE EXCESS BLDV CALC-SCNC: -3.1 MMOL/L — SIGNIFICANT CHANGE UP
BASOPHILS # BLD AUTO: 0.05 K/UL — SIGNIFICANT CHANGE UP (ref 0–0.2)
BASOPHILS NFR BLD AUTO: 0.2 % — SIGNIFICANT CHANGE UP (ref 0–2)
BILIRUB SERPL-MCNC: 0.9 MG/DL — SIGNIFICANT CHANGE UP (ref 0.2–1.2)
BLOOD GAS VENOUS - CREATININE: 1.01 MG/DL — SIGNIFICANT CHANGE UP (ref 0.5–1.3)
BUN SERPL-MCNC: 16 MG/DL — SIGNIFICANT CHANGE UP (ref 7–23)
CALCIUM SERPL-MCNC: 8.9 MG/DL — SIGNIFICANT CHANGE UP (ref 8.4–10.5)
CHLORIDE BLDV-SCNC: 110 MMOL/L — HIGH (ref 96–108)
CHLORIDE SERPL-SCNC: 105 MMOL/L — SIGNIFICANT CHANGE UP (ref 98–107)
CO2 SERPL-SCNC: 20 MMOL/L — LOW (ref 22–31)
CREAT SERPL-MCNC: 0.99 MG/DL — SIGNIFICANT CHANGE UP (ref 0.5–1.3)
EOSINOPHIL # BLD AUTO: 0.09 K/UL — SIGNIFICANT CHANGE UP (ref 0–0.5)
EOSINOPHIL NFR BLD AUTO: 0.4 % — SIGNIFICANT CHANGE UP (ref 0–6)
GAS PNL BLDV: 138 MMOL/L — SIGNIFICANT CHANGE UP (ref 136–146)
GLUCOSE BLDC GLUCOMTR-MCNC: 115 MG/DL — HIGH (ref 70–99)
GLUCOSE BLDC GLUCOMTR-MCNC: 123 MG/DL — HIGH (ref 70–99)
GLUCOSE BLDC GLUCOMTR-MCNC: 134 MG/DL — HIGH (ref 70–99)
GLUCOSE BLDC GLUCOMTR-MCNC: 221 MG/DL — HIGH (ref 70–99)
GLUCOSE BLDC GLUCOMTR-MCNC: 69 MG/DL — LOW (ref 70–99)
GLUCOSE BLDC GLUCOMTR-MCNC: 73 MG/DL — SIGNIFICANT CHANGE UP (ref 70–99)
GLUCOSE BLDC GLUCOMTR-MCNC: 87 MG/DL — SIGNIFICANT CHANGE UP (ref 70–99)
GLUCOSE BLDV-MCNC: 68 MG/DL — LOW (ref 70–99)
GLUCOSE SERPL-MCNC: 71 MG/DL — SIGNIFICANT CHANGE UP (ref 70–99)
HCO3 BLDV-SCNC: 22 MMOL/L — SIGNIFICANT CHANGE UP (ref 20–27)
HCT VFR BLD CALC: 36.8 % — LOW (ref 39–50)
HCT VFR BLDV CALC: 36.4 % — LOW (ref 39–51)
HGB BLD-MCNC: 12.2 G/DL — LOW (ref 13–17)
HGB BLDV-MCNC: 11.8 G/DL — LOW (ref 13–17)
IMM GRANULOCYTES NFR BLD AUTO: 0.9 % — SIGNIFICANT CHANGE UP (ref 0–1.5)
LACTATE BLDV-MCNC: 1.4 MMOL/L — SIGNIFICANT CHANGE UP (ref 0.5–2)
LYMPHOCYTES # BLD AUTO: 11.3 % — LOW (ref 13–44)
LYMPHOCYTES # BLD AUTO: 2.45 K/UL — SIGNIFICANT CHANGE UP (ref 1–3.3)
MAGNESIUM SERPL-MCNC: 1.6 MG/DL — SIGNIFICANT CHANGE UP (ref 1.6–2.6)
MCHC RBC-ENTMCNC: 28.7 PG — SIGNIFICANT CHANGE UP (ref 27–34)
MCHC RBC-ENTMCNC: 33.2 % — SIGNIFICANT CHANGE UP (ref 32–36)
MCV RBC AUTO: 86.6 FL — SIGNIFICANT CHANGE UP (ref 80–100)
MONOCYTES # BLD AUTO: 1.24 K/UL — HIGH (ref 0–0.9)
MONOCYTES NFR BLD AUTO: 5.7 % — SIGNIFICANT CHANGE UP (ref 2–14)
NEUTROPHILS # BLD AUTO: 17.56 K/UL — HIGH (ref 1.8–7.4)
NEUTROPHILS NFR BLD AUTO: 81.5 % — HIGH (ref 43–77)
NRBC # FLD: 0 K/UL — SIGNIFICANT CHANGE UP (ref 0–0)
PCO2 BLDV: 31 MMHG — LOW (ref 41–51)
PH BLDV: 7.44 PH — HIGH (ref 7.32–7.43)
PHOSPHATE SERPL-MCNC: 2.3 MG/DL — LOW (ref 2.5–4.5)
PLATELET # BLD AUTO: 300 K/UL — SIGNIFICANT CHANGE UP (ref 150–400)
PMV BLD: 11.7 FL — SIGNIFICANT CHANGE UP (ref 7–13)
PO2 BLDV: 89 MMHG — HIGH (ref 35–40)
POTASSIUM BLDV-SCNC: 3.9 MMOL/L — SIGNIFICANT CHANGE UP (ref 3.4–4.5)
POTASSIUM SERPL-MCNC: 3.9 MMOL/L — SIGNIFICANT CHANGE UP (ref 3.5–5.3)
POTASSIUM SERPL-SCNC: 3.9 MMOL/L — SIGNIFICANT CHANGE UP (ref 3.5–5.3)
PROT SERPL-MCNC: 6.5 G/DL — SIGNIFICANT CHANGE UP (ref 6–8.3)
RBC # BLD: 4.25 M/UL — SIGNIFICANT CHANGE UP (ref 4.2–5.8)
RBC # FLD: 13.2 % — SIGNIFICANT CHANGE UP (ref 10.3–14.5)
SAO2 % BLDV: 97.4 % — HIGH (ref 60–85)
SODIUM SERPL-SCNC: 138 MMOL/L — SIGNIFICANT CHANGE UP (ref 135–145)
WBC # BLD: 21.59 K/UL — HIGH (ref 3.8–10.5)
WBC # FLD AUTO: 21.59 K/UL — HIGH (ref 3.8–10.5)

## 2019-11-16 PROCEDURE — 99233 SBSQ HOSP IP/OBS HIGH 50: CPT | Mod: GC

## 2019-11-16 RX ADMIN — Medication 2 UNIT(S): at 18:20

## 2019-11-16 RX ADMIN — Medication 2 UNIT(S): at 12:41

## 2019-11-16 RX ADMIN — LISINOPRIL 40 MILLIGRAM(S): 2.5 TABLET ORAL at 06:19

## 2019-11-16 RX ADMIN — TAMSULOSIN HYDROCHLORIDE 0.4 MILLIGRAM(S): 0.4 CAPSULE ORAL at 21:46

## 2019-11-16 RX ADMIN — CLOPIDOGREL BISULFATE 75 MILLIGRAM(S): 75 TABLET, FILM COATED ORAL at 12:13

## 2019-11-16 RX ADMIN — Medication 4: at 12:40

## 2019-11-16 RX ADMIN — SODIUM CHLORIDE 75 MILLILITER(S): 9 INJECTION INTRAMUSCULAR; INTRAVENOUS; SUBCUTANEOUS at 03:47

## 2019-11-16 RX ADMIN — Medication 90 MILLIGRAM(S): at 06:19

## 2019-11-16 RX ADMIN — SENNA PLUS 2 TABLET(S): 8.6 TABLET ORAL at 21:46

## 2019-11-16 RX ADMIN — Medication 2 UNIT(S): at 09:06

## 2019-11-16 RX ADMIN — GABAPENTIN 100 MILLIGRAM(S): 400 CAPSULE ORAL at 12:13

## 2019-11-16 RX ADMIN — Medication 81 MILLIGRAM(S): at 12:13

## 2019-11-16 RX ADMIN — Medication 25 MILLIGRAM(S): at 06:19

## 2019-11-16 RX ADMIN — Medication 25 MILLIGRAM(S): at 18:20

## 2019-11-16 RX ADMIN — PANTOPRAZOLE SODIUM 40 MILLIGRAM(S): 20 TABLET, DELAYED RELEASE ORAL at 06:19

## 2019-11-16 RX ADMIN — Medication 5 MILLIGRAM(S): at 21:46

## 2019-11-16 RX ADMIN — ATORVASTATIN CALCIUM 10 MILLIGRAM(S): 80 TABLET, FILM COATED ORAL at 21:45

## 2019-11-16 NOTE — PROGRESS NOTE ADULT - PROBLEM SELECTOR PLAN 6
Uncontrolled on ISS  -Levemir 10U at home  -Will continue 7U Lantus and 2U pre-meals  -c/w moderate ISS

## 2019-11-16 NOTE — PROGRESS NOTE ADULT - SUBJECTIVE AND OBJECTIVE BOX
Patient is a 74y old  Male who presents with a chief complaint of AMS (15 Nov 2019 07:09)      SUBJECTIVE / OVERNIGHT EVENTS:  Patient denies chest pain, SOB, palpitations, abdominal pain, nausea, vomiting, chills, and cough    MEDICATIONS  (STANDING):  aspirin  chewable 81 milliGRAM(s) Oral daily  atorvastatin 10 milliGRAM(s) Oral at bedtime  clopidogrel Tablet 75 milliGRAM(s) Oral daily  dextrose 5%. 1000 milliLiter(s) (50 mL/Hr) IV Continuous <Continuous>  dextrose 50% Injectable 12.5 Gram(s) IV Push once  dextrose 50% Injectable 25 Gram(s) IV Push once  dextrose 50% Injectable 25 Gram(s) IV Push once  gabapentin 100 milliGRAM(s) Oral daily  influenza   Vaccine 0.5 milliLiter(s) IntraMuscular once  insulin glargine Injectable (LANTUS) 7 Unit(s) SubCutaneous at bedtime  insulin lispro (HumaLOG) corrective regimen sliding scale   SubCutaneous three times a day before meals  insulin lispro (HumaLOG) corrective regimen sliding scale   SubCutaneous at bedtime  insulin lispro Injectable (HumaLOG) 2 Unit(s) SubCutaneous three times a day before meals  lisinopril 40 milliGRAM(s) Oral daily  metoprolol tartrate 25 milliGRAM(s) Oral every 12 hours  NIFEdipine XL 90 milliGRAM(s) Oral daily  oxybutynin 5 milliGRAM(s) Oral at bedtime  pantoprazole    Tablet 40 milliGRAM(s) Oral before breakfast  senna 2 Tablet(s) Oral at bedtime  sodium chloride 0.9%. 1000 milliLiter(s) (75 mL/Hr) IV Continuous <Continuous>  tamsulosin 0.4 milliGRAM(s) Oral at bedtime    MEDICATIONS  (PRN):  acetaminophen   Tablet .. 650 milliGRAM(s) Oral every 6 hours PRN Temp greater or equal to 38C (100.4F), Mild Pain (1 - 3)  dextrose 40% Gel 15 Gram(s) Oral once PRN Blood Glucose LESS THAN 70 milliGRAM(s)/deciliter  glucagon  Injectable 1 milliGRAM(s) IntraMuscular once PRN Glucose LESS THAN 70 milligrams/deciliter      T(F): 98.6 (11-16 @ 06:15), Max: 98.9 (11-15 @ 22:07)  HR: 98 (11-16 @ 06:15) (71 - 98)  BP: 121/59 (11-16 @ 06:15) (102/59 - 130/61)  RR: 18 (11-16 @ 06:15) (16 - 18)  SpO2: 97% (11-16 @ 06:15) (97% - 100%)  CAPILLARY BLOOD GLUCOSE      POCT Blood Glucose.: 73 mg/dL (16 Nov 2019 09:27)  POCT Blood Glucose.: 69 mg/dL (16 Nov 2019 08:33)  POCT Blood Glucose.: 105 mg/dL (15 Nov 2019 22:23)  POCT Blood Glucose.: 249 mg/dL (15 Nov 2019 18:05)  POCT Blood Glucose.: 87 mg/dL (15 Nov 2019 12:32)    I&O's Summary    15 Nov 2019 07:01  -  16 Nov 2019 07:00  --------------------------------------------------------  IN: 1525 mL / OUT: 0 mL / NET: 1525 mL        PHYSICAL EXAM:  GEN: Appears in no acute distress  HEENT: PERRLA, EOMI and accommodate, neck supple, no lymphadenopathy, no JVD  MOUTH: moist mucous membranes, no exudates or lesions   PULM: Clear to auscultation bilaterally, no wheezes, rales, rhonchi  CV: RRR, S1S2, no murmurs, rubs or gallops  Abdominal: Soft, nontender to palpation, non-distended, normoactive bowel sounds  Extremities: WWP, No edema, + peripheral pulses  NEURO: AAOx3, moving all four extremities spontaneously  Skin: No rashes, lesions, hematomas, ecchymosis      LABS:  Labs personally reviewed.                        12.2   21.59 )-----------( 300      ( 16 Nov 2019 06:35 )             36.8     Hgb Trend: 12.2<--, 12.6<--, 11.8<--, 13.1<--, 12.8<--  11-16    138  |  105  |  16  ----------------------------<  71  3.9   |  20<L>  |  0.99    Ca    8.9      16 Nov 2019 06:35  Phos  2.3     11-16  Mg     1.6     11-16    TPro  6.5  /  Alb  3.3  /  TBili  0.9  /  DBili  x   /  AST  18  /  ALT  37  /  AlkPhos  105  11-16    Creatinine Trend: 0.99<--, 1.04<--, 1.06<--, 1.19<--, 1.33<--, 1.54<--        RADIOLOGY & ADDITIONAL TESTS:  Imaging Personally Reviewed.    Consultants:      Celso Campos PGY-3 Pager: MICHELE [phone]/ [phone]  Night Float: [phone]/ NS

## 2019-11-16 NOTE — PROVIDER CONTACT NOTE (OTHER) - ACTION/TREATMENT ORDERED:
carbohydrate replacement given.  blood sugar monitored
Medication held as per MD order. Ongoing monitoring for this patient.

## 2019-11-16 NOTE — PROGRESS NOTE ADULT - PROBLEM SELECTOR PLAN 1
Likely 2/2 metabolic encephalopathy/ lactic acidosis in the setting of metformin use. Also w/ hypernatremia with findings of hypovolemia on physical exam likely 2/2 diuresis.   -AMS now resolving  -Hypernatremia now resolved   -Acidosis now resolved. However still w/ elevated lactate   -Blood and urine culture NGTD   -Will continue to monitor

## 2019-11-16 NOTE — PROGRESS NOTE ADULT - PROBLEM SELECTOR PLAN 2
Leukocytosis worsening, monitor off Abx  -No clear infectious source at this time. Afebrile  -Blood and urine culture NGTD  -Monitor off abx

## 2019-11-16 NOTE — PROGRESS NOTE ADULT - PROBLEM SELECTOR PLAN 3
-Acidosis now resolved pt however still w/ elevated lactate  -continue fluid repletion as needed   -Monitor VBGs

## 2019-11-17 LAB
ANION GAP SERPL CALC-SCNC: 11 MMO/L — SIGNIFICANT CHANGE UP (ref 7–14)
APPEARANCE UR: SIGNIFICANT CHANGE UP
BACTERIA # UR AUTO: SIGNIFICANT CHANGE UP
BASE EXCESS BLDV CALC-SCNC: -2.6 MMOL/L — SIGNIFICANT CHANGE UP
BASOPHILS # BLD AUTO: 0.04 K/UL — SIGNIFICANT CHANGE UP (ref 0–0.2)
BASOPHILS NFR BLD AUTO: 0.3 % — SIGNIFICANT CHANGE UP (ref 0–2)
BILIRUB UR-MCNC: NEGATIVE — SIGNIFICANT CHANGE UP
BLOOD GAS VENOUS - CREATININE: 1.24 MG/DL — SIGNIFICANT CHANGE UP (ref 0.5–1.3)
BLOOD UR QL VISUAL: SIGNIFICANT CHANGE UP
BUN SERPL-MCNC: 24 MG/DL — HIGH (ref 7–23)
CALCIUM SERPL-MCNC: 8.8 MG/DL — SIGNIFICANT CHANGE UP (ref 8.4–10.5)
CHLORIDE BLDV-SCNC: 110 MMOL/L — HIGH (ref 96–108)
CHLORIDE SERPL-SCNC: 105 MMOL/L — SIGNIFICANT CHANGE UP (ref 98–107)
CO2 SERPL-SCNC: 20 MMOL/L — LOW (ref 22–31)
COLOR SPEC: YELLOW — SIGNIFICANT CHANGE UP
CREAT SERPL-MCNC: 1.21 MG/DL — SIGNIFICANT CHANGE UP (ref 0.5–1.3)
EOSINOPHIL # BLD AUTO: 0.19 K/UL — SIGNIFICANT CHANGE UP (ref 0–0.5)
EOSINOPHIL NFR BLD AUTO: 1.3 % — SIGNIFICANT CHANGE UP (ref 0–6)
GAS PNL BLDV: 139 MMOL/L — SIGNIFICANT CHANGE UP (ref 136–146)
GLUCOSE BLDC GLUCOMTR-MCNC: 162 MG/DL — HIGH (ref 70–99)
GLUCOSE BLDC GLUCOMTR-MCNC: 217 MG/DL — HIGH (ref 70–99)
GLUCOSE BLDC GLUCOMTR-MCNC: 268 MG/DL — HIGH (ref 70–99)
GLUCOSE BLDC GLUCOMTR-MCNC: 304 MG/DL — HIGH (ref 70–99)
GLUCOSE BLDV-MCNC: 227 MG/DL — HIGH (ref 70–99)
GLUCOSE SERPL-MCNC: 230 MG/DL — HIGH (ref 70–99)
GLUCOSE UR-MCNC: NEGATIVE — SIGNIFICANT CHANGE UP
HCO3 BLDV-SCNC: 22 MMOL/L — SIGNIFICANT CHANGE UP (ref 20–27)
HCT VFR BLD CALC: 35.2 % — LOW (ref 39–50)
HCT VFR BLDV CALC: 37.2 % — LOW (ref 39–51)
HGB BLD-MCNC: 11.7 G/DL — LOW (ref 13–17)
HGB BLDV-MCNC: 12.1 G/DL — LOW (ref 13–17)
HYALINE CASTS # UR AUTO: NEGATIVE — SIGNIFICANT CHANGE UP
IMM GRANULOCYTES NFR BLD AUTO: 0.8 % — SIGNIFICANT CHANGE UP (ref 0–1.5)
KETONES UR-MCNC: SIGNIFICANT CHANGE UP
LACTATE BLDV-MCNC: 1.1 MMOL/L — SIGNIFICANT CHANGE UP (ref 0.5–2)
LEUKOCYTE ESTERASE UR-ACNC: SIGNIFICANT CHANGE UP
LYMPHOCYTES # BLD AUTO: 13.3 % — SIGNIFICANT CHANGE UP (ref 13–44)
LYMPHOCYTES # BLD AUTO: 2.02 K/UL — SIGNIFICANT CHANGE UP (ref 1–3.3)
MAGNESIUM SERPL-MCNC: 1.8 MG/DL — SIGNIFICANT CHANGE UP (ref 1.6–2.6)
MCHC RBC-ENTMCNC: 28.5 PG — SIGNIFICANT CHANGE UP (ref 27–34)
MCHC RBC-ENTMCNC: 33.2 % — SIGNIFICANT CHANGE UP (ref 32–36)
MCV RBC AUTO: 85.9 FL — SIGNIFICANT CHANGE UP (ref 80–100)
MONOCYTES # BLD AUTO: 0.76 K/UL — SIGNIFICANT CHANGE UP (ref 0–0.9)
MONOCYTES NFR BLD AUTO: 5 % — SIGNIFICANT CHANGE UP (ref 2–14)
NEUTROPHILS # BLD AUTO: 12.03 K/UL — HIGH (ref 1.8–7.4)
NEUTROPHILS NFR BLD AUTO: 79.3 % — HIGH (ref 43–77)
NITRITE UR-MCNC: NEGATIVE — SIGNIFICANT CHANGE UP
NRBC # FLD: 0 K/UL — SIGNIFICANT CHANGE UP (ref 0–0)
PCO2 BLDV: 38 MMHG — LOW (ref 41–51)
PH BLDV: 7.38 PH — SIGNIFICANT CHANGE UP (ref 7.32–7.43)
PH UR: 6 — SIGNIFICANT CHANGE UP (ref 5–8)
PHOSPHATE SERPL-MCNC: 3.2 MG/DL — SIGNIFICANT CHANGE UP (ref 2.5–4.5)
PLATELET # BLD AUTO: 305 K/UL — SIGNIFICANT CHANGE UP (ref 150–400)
PMV BLD: 11.3 FL — SIGNIFICANT CHANGE UP (ref 7–13)
PO2 BLDV: 67 MMHG — HIGH (ref 35–40)
POTASSIUM BLDV-SCNC: 4.2 MMOL/L — SIGNIFICANT CHANGE UP (ref 3.4–4.5)
POTASSIUM SERPL-MCNC: 4.3 MMOL/L — SIGNIFICANT CHANGE UP (ref 3.5–5.3)
POTASSIUM SERPL-SCNC: 4.3 MMOL/L — SIGNIFICANT CHANGE UP (ref 3.5–5.3)
PROT UR-MCNC: 50 — SIGNIFICANT CHANGE UP
RBC # BLD: 4.1 M/UL — LOW (ref 4.2–5.8)
RBC # FLD: 13.5 % — SIGNIFICANT CHANGE UP (ref 10.3–14.5)
RBC CASTS # UR COMP ASSIST: SIGNIFICANT CHANGE UP (ref 0–?)
SAO2 % BLDV: 92.8 % — HIGH (ref 60–85)
SODIUM SERPL-SCNC: 136 MMOL/L — SIGNIFICANT CHANGE UP (ref 135–145)
SP GR SPEC: 1.02 — SIGNIFICANT CHANGE UP (ref 1–1.04)
SQUAMOUS # UR AUTO: SIGNIFICANT CHANGE UP
UROBILINOGEN FLD QL: NORMAL — SIGNIFICANT CHANGE UP
WBC # BLD: 15.16 K/UL — HIGH (ref 3.8–10.5)
WBC # FLD AUTO: 15.16 K/UL — HIGH (ref 3.8–10.5)
WBC UR QL: >50 — HIGH (ref 0–?)

## 2019-11-17 PROCEDURE — 99233 SBSQ HOSP IP/OBS HIGH 50: CPT | Mod: GC

## 2019-11-17 RX ORDER — CEFTRIAXONE 500 MG/1
1000 INJECTION, POWDER, FOR SOLUTION INTRAMUSCULAR; INTRAVENOUS EVERY 24 HOURS
Refills: 0 | Status: DISCONTINUED | OUTPATIENT
Start: 2019-11-17 | End: 2019-11-19

## 2019-11-17 RX ADMIN — GABAPENTIN 100 MILLIGRAM(S): 400 CAPSULE ORAL at 12:32

## 2019-11-17 RX ADMIN — Medication 2: at 09:04

## 2019-11-17 RX ADMIN — Medication 2 UNIT(S): at 17:48

## 2019-11-17 RX ADMIN — LISINOPRIL 40 MILLIGRAM(S): 2.5 TABLET ORAL at 06:23

## 2019-11-17 RX ADMIN — Medication 6: at 17:47

## 2019-11-17 RX ADMIN — Medication 25 MILLIGRAM(S): at 17:48

## 2019-11-17 RX ADMIN — Medication 5 MILLIGRAM(S): at 22:10

## 2019-11-17 RX ADMIN — Medication 4: at 22:09

## 2019-11-17 RX ADMIN — Medication 25 MILLIGRAM(S): at 06:22

## 2019-11-17 RX ADMIN — Medication 81 MILLIGRAM(S): at 12:32

## 2019-11-17 RX ADMIN — Medication 2 UNIT(S): at 09:05

## 2019-11-17 RX ADMIN — Medication 2 UNIT(S): at 12:36

## 2019-11-17 RX ADMIN — Medication 90 MILLIGRAM(S): at 06:22

## 2019-11-17 RX ADMIN — ATORVASTATIN CALCIUM 10 MILLIGRAM(S): 80 TABLET, FILM COATED ORAL at 22:09

## 2019-11-17 RX ADMIN — TAMSULOSIN HYDROCHLORIDE 0.4 MILLIGRAM(S): 0.4 CAPSULE ORAL at 22:10

## 2019-11-17 RX ADMIN — SENNA PLUS 2 TABLET(S): 8.6 TABLET ORAL at 22:10

## 2019-11-17 RX ADMIN — CEFTRIAXONE 100 MILLIGRAM(S): 500 INJECTION, POWDER, FOR SOLUTION INTRAMUSCULAR; INTRAVENOUS at 21:00

## 2019-11-17 RX ADMIN — PANTOPRAZOLE SODIUM 40 MILLIGRAM(S): 20 TABLET, DELAYED RELEASE ORAL at 06:23

## 2019-11-17 RX ADMIN — Medication 4: at 12:35

## 2019-11-17 RX ADMIN — CLOPIDOGREL BISULFATE 75 MILLIGRAM(S): 75 TABLET, FILM COATED ORAL at 12:32

## 2019-11-17 NOTE — PROGRESS NOTE ADULT - PROBLEM SELECTOR PLAN 1
Likely 2/2 metabolic encephalopathy/ lactic acidosis in the setting of metformin use. Also w/ hypernatremia with findings of hypovolemia on physical exam likely 2/2 diuresis.   -AMS now resolving  -Hypernatremia now resolved   -Acidosis now resolved. However still w/ elevated lactate   -Blood and urine culture NGTD   -Will continue to monitor Unclear etiology  Diff- metabolic encephalopathy/ lactic acidosis in the setting of metformin use vs hypernatremia vs infection? Pt w/ up-trending leukocytosis   -Acidosis and hypernatremia now resolved    -Blood and urine culture NGTD   -F/u B12 and folate  -Will maintain low threshold for re-starting abx given uptrending leukocytosis   -Will continue to monitor

## 2019-11-17 NOTE — PROGRESS NOTE ADULT - PROBLEM SELECTOR PLAN 3
-Acidosis now resolved pt however still w/ elevated lactate  -continue fluid repletion as needed   -Monitor VBGs -Acidosis now resolved   -continue fluid repletion as needed   -Monitor VBGs

## 2019-11-17 NOTE — PROGRESS NOTE ADULT - PROBLEM SELECTOR PLAN 2
Leukocytosis worsening, monitor off Abx  -No clear infectious source at this time. Afebrile  -Blood and urine culture NGTD  -Monitor off abx Leukocytosis worsening, monitor off Abx  -No clear infectious source at this time. Afebrile  -Blood and urine culture NGTD  -CXR negative   -Monitor off abx

## 2019-11-17 NOTE — PROGRESS NOTE ADULT - SUBJECTIVE AND OBJECTIVE BOX
Salma Navarro MD  PGY 1 Department of Internal Medicine  Pager: [phone]    Patient is a 74y old  Male who presents with a chief complaint of AMS (16 Nov 2019 09:42)      SUBJECTIVE / OVERNIGHT EVENTS: Pt seen and examined. No acute overnight events.        MEDICATIONS  (STANDING):  aspirin  chewable 81 milliGRAM(s) Oral daily  atorvastatin 10 milliGRAM(s) Oral at bedtime  clopidogrel Tablet 75 milliGRAM(s) Oral daily  dextrose 5%. 1000 milliLiter(s) (50 mL/Hr) IV Continuous <Continuous>  dextrose 50% Injectable 12.5 Gram(s) IV Push once  dextrose 50% Injectable 25 Gram(s) IV Push once  dextrose 50% Injectable 25 Gram(s) IV Push once  gabapentin 100 milliGRAM(s) Oral daily  influenza   Vaccine 0.5 milliLiter(s) IntraMuscular once  insulin lispro (HumaLOG) corrective regimen sliding scale   SubCutaneous three times a day before meals  insulin lispro (HumaLOG) corrective regimen sliding scale   SubCutaneous at bedtime  insulin lispro Injectable (HumaLOG) 2 Unit(s) SubCutaneous three times a day before meals  lisinopril 40 milliGRAM(s) Oral daily  metoprolol tartrate 25 milliGRAM(s) Oral every 12 hours  NIFEdipine XL 90 milliGRAM(s) Oral daily  oxybutynin 5 milliGRAM(s) Oral at bedtime  pantoprazole    Tablet 40 milliGRAM(s) Oral before breakfast  senna 2 Tablet(s) Oral at bedtime  tamsulosin 0.4 milliGRAM(s) Oral at bedtime    MEDICATIONS  (PRN):  acetaminophen   Tablet .. 650 milliGRAM(s) Oral every 6 hours PRN Temp greater or equal to 38C (100.4F), Mild Pain (1 - 3)  dextrose 40% Gel 15 Gram(s) Oral once PRN Blood Glucose LESS THAN 70 milliGRAM(s)/deciliter  glucagon  Injectable 1 milliGRAM(s) IntraMuscular once PRN Glucose LESS THAN 70 milligrams/deciliter      I&O's Summary      Vital Signs Last 24 Hrs  T(C): 36.5 (17 Nov 2019 06:09), Max: 37.7 (16 Nov 2019 15:05)  T(F): 97.7 (17 Nov 2019 06:09), Max: 99.8 (16 Nov 2019 15:05)  HR: 80 (17 Nov 2019 06:09) (74 - 86)  BP: 138/65 (17 Nov 2019 06:09) (111/63 - 151/64)  BP(mean): --  RR: 18 (17 Nov 2019 06:09) (15 - 18)  SpO2: 99% (17 Nov 2019 06:09) (94% - 99%)    CAPILLARY BLOOD GLUCOSE      POCT Blood Glucose.: 115 mg/dL (16 Nov 2019 22:37)  POCT Blood Glucose.: 123 mg/dL (16 Nov 2019 18:06)  POCT Blood Glucose.: 221 mg/dL (16 Nov 2019 12:34)  POCT Blood Glucose.: 134 mg/dL (16 Nov 2019 10:19)  POCT Blood Glucose.: 87 mg/dL (16 Nov 2019 09:58)  POCT Blood Glucose.: 73 mg/dL (16 Nov 2019 09:27)  POCT Blood Glucose.: 69 mg/dL (16 Nov 2019 08:33)      PHYSICAL EXAM:  GEN: Appears in no acute distress  HEENT: PERRLA, EOMI and accommodate, neck supple, no lymphadenopathy, no JVD  MOUTH: moist mucous membranes, no exudates or lesions   PULM: Clear to auscultation bilaterally, no wheezes, rales, rhonchi  CV: RRR, S1S2, no murmurs, rubs or gallops  Abdominal: Soft, nontender to palpation, non-distended, normoactive bowel sounds  Extremities: WWP, No edema, + peripheral pulses  NEURO: AAOx3, moving all four extremities spontaneously  Skin: No rashes, lesions, hematomas, ecchymosis    LABS:                        12.2   21.59 )-----------( 300      ( 16 Nov 2019 06:35 )             36.8     Auto Eosinophil # 0.09  / Auto Eosinophil % 0.4   / Auto Neutrophil # 17.56 / Auto Neutrophil % 81.5  / BANDS % x        11-16    138  |  105  |  16  ----------------------------<  71  3.9   |  20<L>  |  0.99    Ca    8.9      16 Nov 2019 06:35  Mg     1.6     11-16  Phos  2.3     11-16  TPro  6.5  /  Alb  3.3  /  TBili  0.9  /  DBili  x   /  AST  18  /  ALT  37  /  AlkPhos  105  11-16            Lactate, Blood: 2.8 mmol/L (11-13 @ 10:00)        RADIOLOGY & ADDITIONAL TESTS:    Imaging Personally Reviewed:    Consultant(s) Notes Reviewed:      Care Discussed with Consultants/Other Providers: Salma Navarro MD  PGY 1 Department of Internal Medicine  Pager: [phone]    Patient is a 74y old  Male who presents with a chief complaint of AMS (16 Nov 2019 09:42)      SUBJECTIVE / OVERNIGHT EVENTS: Pt seen and examined. No acute overnight events. Easily re-directed , however still w/ intermittent confusion        MEDICATIONS  (STANDING):  aspirin  chewable 81 milliGRAM(s) Oral daily  atorvastatin 10 milliGRAM(s) Oral at bedtime  clopidogrel Tablet 75 milliGRAM(s) Oral daily  dextrose 5%. 1000 milliLiter(s) (50 mL/Hr) IV Continuous <Continuous>  dextrose 50% Injectable 12.5 Gram(s) IV Push once  dextrose 50% Injectable 25 Gram(s) IV Push once  dextrose 50% Injectable 25 Gram(s) IV Push once  gabapentin 100 milliGRAM(s) Oral daily  influenza   Vaccine 0.5 milliLiter(s) IntraMuscular once  insulin lispro (HumaLOG) corrective regimen sliding scale   SubCutaneous three times a day before meals  insulin lispro (HumaLOG) corrective regimen sliding scale   SubCutaneous at bedtime  insulin lispro Injectable (HumaLOG) 2 Unit(s) SubCutaneous three times a day before meals  lisinopril 40 milliGRAM(s) Oral daily  metoprolol tartrate 25 milliGRAM(s) Oral every 12 hours  NIFEdipine XL 90 milliGRAM(s) Oral daily  oxybutynin 5 milliGRAM(s) Oral at bedtime  pantoprazole    Tablet 40 milliGRAM(s) Oral before breakfast  senna 2 Tablet(s) Oral at bedtime  tamsulosin 0.4 milliGRAM(s) Oral at bedtime    MEDICATIONS  (PRN):  acetaminophen   Tablet .. 650 milliGRAM(s) Oral every 6 hours PRN Temp greater or equal to 38C (100.4F), Mild Pain (1 - 3)  dextrose 40% Gel 15 Gram(s) Oral once PRN Blood Glucose LESS THAN 70 milliGRAM(s)/deciliter  glucagon  Injectable 1 milliGRAM(s) IntraMuscular once PRN Glucose LESS THAN 70 milligrams/deciliter      I&O's Summary      Vital Signs Last 24 Hrs  T(C): 36.5 (17 Nov 2019 06:09), Max: 37.7 (16 Nov 2019 15:05)  T(F): 97.7 (17 Nov 2019 06:09), Max: 99.8 (16 Nov 2019 15:05)  HR: 80 (17 Nov 2019 06:09) (74 - 86)  BP: 138/65 (17 Nov 2019 06:09) (111/63 - 151/64)  BP(mean): --  RR: 18 (17 Nov 2019 06:09) (15 - 18)  SpO2: 99% (17 Nov 2019 06:09) (94% - 99%)    CAPILLARY BLOOD GLUCOSE      POCT Blood Glucose.: 115 mg/dL (16 Nov 2019 22:37)  POCT Blood Glucose.: 123 mg/dL (16 Nov 2019 18:06)  POCT Blood Glucose.: 221 mg/dL (16 Nov 2019 12:34)  POCT Blood Glucose.: 134 mg/dL (16 Nov 2019 10:19)  POCT Blood Glucose.: 87 mg/dL (16 Nov 2019 09:58)  POCT Blood Glucose.: 73 mg/dL (16 Nov 2019 09:27)  POCT Blood Glucose.: 69 mg/dL (16 Nov 2019 08:33)      PHYSICAL EXAM:              GENERAL: Cachectic, NAD  	EYES: Conjunctiva and sclera clear  	ENT: dry mucosal membranes, no nasal discharge  	NECK: Supple, No JVD  	CHEST/LUNG: Clear to auscultation bilaterally; No wheeze  	HEART: Regular rate and rhythm; No murmurs, rubs, or gallops  	ABDOMEN: Soft, Nontender, Nondistended; Bowel sounds present  	PSYCH: Nl behavior, nl affect  	NEUROLOGY: AAOx2 (self and place)              EXTREMITIES: No LE edema; +Skin tenting, dry skin.    LABS:                        12.2   21.59 )-----------( 300      ( 16 Nov 2019 06:35 )             36.8     Auto Eosinophil # 0.09  / Auto Eosinophil % 0.4   / Auto Neutrophil # 17.56 / Auto Neutrophil % 81.5  / BANDS % x        11-16    138  |  105  |  16  ----------------------------<  71  3.9   |  20<L>  |  0.99    Ca    8.9      16 Nov 2019 06:35  Mg     1.6     11-16  Phos  2.3     11-16  TPro  6.5  /  Alb  3.3  /  TBili  0.9  /  DBili  x   /  AST  18  /  ALT  37  /  AlkPhos  105  11-16            Lactate, Blood: 2.8 mmol/L (11-13 @ 10:00)        RADIOLOGY & ADDITIONAL TESTS:    Imaging Personally Reviewed:    Consultant(s) Notes Reviewed:      Care Discussed with Consultants/Other Providers:

## 2019-11-17 NOTE — PROGRESS NOTE ADULT - ATTENDING COMMENTS
WBCs trending down, lactate normalized. Continue supportive care, hydration, nutrition. Anticipate [facility] this week.

## 2019-11-17 NOTE — PROGRESS NOTE ADULT - PROBLEM SELECTOR PLAN 6
Uncontrolled on ISS  -Levemir 10U at home  -Will continue 7U Lantus and 2U pre-meals  -c/w moderate ISS Uncontrolled on ISS  -Levemir 10U at home  -Will continue ISS and  2U pre-meals

## 2019-11-18 LAB
ANION GAP SERPL CALC-SCNC: 12 MMO/L — SIGNIFICANT CHANGE UP (ref 7–14)
BACTERIA BLD CULT: SIGNIFICANT CHANGE UP
BACTERIA BLD CULT: SIGNIFICANT CHANGE UP
BASOPHILS # BLD AUTO: 0.06 K/UL — SIGNIFICANT CHANGE UP (ref 0–0.2)
BASOPHILS NFR BLD AUTO: 0.5 % — SIGNIFICANT CHANGE UP (ref 0–2)
BUN SERPL-MCNC: 23 MG/DL — SIGNIFICANT CHANGE UP (ref 7–23)
CALCIUM SERPL-MCNC: 9 MG/DL — SIGNIFICANT CHANGE UP (ref 8.4–10.5)
CHLORIDE SERPL-SCNC: 109 MMOL/L — HIGH (ref 98–107)
CO2 SERPL-SCNC: 21 MMOL/L — LOW (ref 22–31)
CREAT SERPL-MCNC: 1.05 MG/DL — SIGNIFICANT CHANGE UP (ref 0.5–1.3)
EOSINOPHIL # BLD AUTO: 0.33 K/UL — SIGNIFICANT CHANGE UP (ref 0–0.5)
EOSINOPHIL NFR BLD AUTO: 2.8 % — SIGNIFICANT CHANGE UP (ref 0–6)
FOLATE SERPL-MCNC: 13.1 NG/ML — SIGNIFICANT CHANGE UP (ref 4.7–20)
GLUCOSE BLDC GLUCOMTR-MCNC: 144 MG/DL — HIGH (ref 70–99)
GLUCOSE BLDC GLUCOMTR-MCNC: 184 MG/DL — HIGH (ref 70–99)
GLUCOSE BLDC GLUCOMTR-MCNC: 270 MG/DL — HIGH (ref 70–99)
GLUCOSE BLDC GLUCOMTR-MCNC: 279 MG/DL — HIGH (ref 70–99)
GLUCOSE SERPL-MCNC: 224 MG/DL — HIGH (ref 70–99)
HCT VFR BLD CALC: 33.3 % — LOW (ref 39–50)
HGB BLD-MCNC: 11.1 G/DL — LOW (ref 13–17)
IMM GRANULOCYTES NFR BLD AUTO: 0.9 % — SIGNIFICANT CHANGE UP (ref 0–1.5)
LYMPHOCYTES # BLD AUTO: 1.92 K/UL — SIGNIFICANT CHANGE UP (ref 1–3.3)
LYMPHOCYTES # BLD AUTO: 16 % — SIGNIFICANT CHANGE UP (ref 13–44)
MAGNESIUM SERPL-MCNC: 1.8 MG/DL — SIGNIFICANT CHANGE UP (ref 1.6–2.6)
MCHC RBC-ENTMCNC: 28.9 PG — SIGNIFICANT CHANGE UP (ref 27–34)
MCHC RBC-ENTMCNC: 33.3 % — SIGNIFICANT CHANGE UP (ref 32–36)
MCV RBC AUTO: 86.7 FL — SIGNIFICANT CHANGE UP (ref 80–100)
MONOCYTES # BLD AUTO: 0.95 K/UL — HIGH (ref 0–0.9)
MONOCYTES NFR BLD AUTO: 7.9 % — SIGNIFICANT CHANGE UP (ref 2–14)
NEUTROPHILS # BLD AUTO: 8.62 K/UL — HIGH (ref 1.8–7.4)
NEUTROPHILS NFR BLD AUTO: 71.9 % — SIGNIFICANT CHANGE UP (ref 43–77)
NRBC # FLD: 0 K/UL — SIGNIFICANT CHANGE UP (ref 0–0)
PHOSPHATE SERPL-MCNC: 2.7 MG/DL — SIGNIFICANT CHANGE UP (ref 2.5–4.5)
PLATELET # BLD AUTO: 349 K/UL — SIGNIFICANT CHANGE UP (ref 150–400)
PMV BLD: 11.7 FL — SIGNIFICANT CHANGE UP (ref 7–13)
POTASSIUM SERPL-MCNC: 4.1 MMOL/L — SIGNIFICANT CHANGE UP (ref 3.5–5.3)
POTASSIUM SERPL-SCNC: 4.1 MMOL/L — SIGNIFICANT CHANGE UP (ref 3.5–5.3)
RBC # BLD: 3.84 M/UL — LOW (ref 4.2–5.8)
RBC # FLD: 13.5 % — SIGNIFICANT CHANGE UP (ref 10.3–14.5)
SODIUM SERPL-SCNC: 142 MMOL/L — SIGNIFICANT CHANGE UP (ref 135–145)
SPECIMEN SOURCE: SIGNIFICANT CHANGE UP
VIT B12 SERPL-MCNC: 1196 PG/ML — HIGH (ref 200–900)
WBC # BLD: 11.99 K/UL — HIGH (ref 3.8–10.5)
WBC # FLD AUTO: 11.99 K/UL — HIGH (ref 3.8–10.5)

## 2019-11-18 PROCEDURE — 99233 SBSQ HOSP IP/OBS HIGH 50: CPT | Mod: GC

## 2019-11-18 RX ORDER — SODIUM CHLORIDE 9 MG/ML
1000 INJECTION INTRAMUSCULAR; INTRAVENOUS; SUBCUTANEOUS
Refills: 0 | Status: DISCONTINUED | OUTPATIENT
Start: 2019-11-18 | End: 2019-11-19

## 2019-11-18 RX ORDER — INSULIN LISPRO 100/ML
4 VIAL (ML) SUBCUTANEOUS
Refills: 0 | Status: DISCONTINUED | OUTPATIENT
Start: 2019-11-18 | End: 2019-11-20

## 2019-11-18 RX ORDER — INSULIN GLARGINE 100 [IU]/ML
5 INJECTION, SOLUTION SUBCUTANEOUS AT BEDTIME
Refills: 0 | Status: DISCONTINUED | OUTPATIENT
Start: 2019-11-18 | End: 2019-11-20

## 2019-11-18 RX ORDER — INSULIN GLARGINE 100 [IU]/ML
3 INJECTION, SOLUTION SUBCUTANEOUS AT BEDTIME
Refills: 0 | Status: DISCONTINUED | OUTPATIENT
Start: 2019-11-18 | End: 2019-11-18

## 2019-11-18 RX ADMIN — Medication 6: at 13:12

## 2019-11-18 RX ADMIN — Medication 2 UNIT(S): at 13:12

## 2019-11-18 RX ADMIN — SODIUM CHLORIDE 75 MILLILITER(S): 9 INJECTION INTRAMUSCULAR; INTRAVENOUS; SUBCUTANEOUS at 16:16

## 2019-11-18 RX ADMIN — Medication 4 UNIT(S): at 18:26

## 2019-11-18 RX ADMIN — ATORVASTATIN CALCIUM 10 MILLIGRAM(S): 80 TABLET, FILM COATED ORAL at 21:38

## 2019-11-18 RX ADMIN — GABAPENTIN 100 MILLIGRAM(S): 400 CAPSULE ORAL at 13:14

## 2019-11-18 RX ADMIN — Medication 90 MILLIGRAM(S): at 06:11

## 2019-11-18 RX ADMIN — CEFTRIAXONE 100 MILLIGRAM(S): 500 INJECTION, POWDER, FOR SOLUTION INTRAMUSCULAR; INTRAVENOUS at 21:38

## 2019-11-18 RX ADMIN — LISINOPRIL 40 MILLIGRAM(S): 2.5 TABLET ORAL at 06:11

## 2019-11-18 RX ADMIN — PANTOPRAZOLE SODIUM 40 MILLIGRAM(S): 20 TABLET, DELAYED RELEASE ORAL at 06:11

## 2019-11-18 RX ADMIN — INSULIN GLARGINE 5 UNIT(S): 100 INJECTION, SOLUTION SUBCUTANEOUS at 21:41

## 2019-11-18 RX ADMIN — CLOPIDOGREL BISULFATE 75 MILLIGRAM(S): 75 TABLET, FILM COATED ORAL at 13:15

## 2019-11-18 RX ADMIN — Medication 81 MILLIGRAM(S): at 13:14

## 2019-11-18 RX ADMIN — Medication 6: at 09:26

## 2019-11-18 RX ADMIN — SENNA PLUS 2 TABLET(S): 8.6 TABLET ORAL at 21:39

## 2019-11-18 RX ADMIN — TAMSULOSIN HYDROCHLORIDE 0.4 MILLIGRAM(S): 0.4 CAPSULE ORAL at 21:38

## 2019-11-18 RX ADMIN — Medication 2 UNIT(S): at 09:27

## 2019-11-18 RX ADMIN — Medication 5 MILLIGRAM(S): at 21:39

## 2019-11-18 RX ADMIN — Medication 25 MILLIGRAM(S): at 06:11

## 2019-11-18 RX ADMIN — Medication 25 MILLIGRAM(S): at 18:31

## 2019-11-18 NOTE — PROGRESS NOTE ADULT - PROBLEM SELECTOR PLAN 6
Uncontrolled on ISS  -Levemir 10U at home  -Will continue ISS and  2U pre-meals Uncontrolled on ISS  -Levemir 10U at home  -Will continue ISS and  2U pre-meals  -Will start lantus 3u at bedtime

## 2019-11-18 NOTE — PROGRESS NOTE ADULT - PROBLEM SELECTOR PLAN 2
Leukocytosis worsening, monitor off Abx  -No clear infectious source at this time. Afebrile  -Blood and urine culture NGTD  -CXR negative   -Monitor off abx Leukocytosis uptrending.  Afebrile  -Likely 2/2 UTI  -CXR negative  -See rest of work up/management above    -Monitor off abx

## 2019-11-18 NOTE — PROGRESS NOTE ADULT - ATTENDING COMMENTS
Urine culture growing GNRs, will continue with Ceftriaxone pending speciation and sensitivities. FS trending up, will adjust basal/bolus regimen accordingly. Plan for discharge to [facility]- hold metformin upon dishcarge

## 2019-11-18 NOTE — PROGRESS NOTE ADULT - PROBLEM SELECTOR PLAN 1
- Lasix decreased to 10 mg/hr (At home on Torsemide 40/20)  - UO with 5 lt for negative 3.5 lt today.  - TTE: LV 10 cm ( changed from prior TTE 7 cm), Moderately reduced RVSF, Severe biatrial enlargement. Mild-to-moderate MR/TR.    -Repeat Echo today with contrast   - Encouraged to quit smoking if considering OHTx as future options   Unclear etiology  Diff- metabolic encephalopathy/ lactic acidosis in the setting of metformin use vs hypernatremia vs infection? Pt w/ up-trending leukocytosis   -Acidosis and hypernatremia now resolved    -Blood and urine culture NGTD   -F/u B12 and folate  -Will maintain low threshold for re-starting abx given uptrending leukocytosis   -Will continue to monitor Likely 2/2 UTI, U culture sig for Gram negative rods  -Started on IV Ceftriaxone overnight  -Pt w/ up-trending leukocytosis   -F/u sensitivities   -Blood culture NGTD   -F/u B12 and folate

## 2019-11-18 NOTE — PROGRESS NOTE ADULT - SUBJECTIVE AND OBJECTIVE BOX
Salma Navarro MD  PGY 1 Department of Internal Medicine  Pager: [phone]    Patient is a 74y old  Male who presents with a chief complaint of AMS (2019 07:35)      SUBJECTIVE / OVERNIGHT EVENTS: Pt seen and examined. No acute overnight events.        MEDICATIONS  (STANDING):  aspirin  chewable 81 milliGRAM(s) Oral daily  atorvastatin 10 milliGRAM(s) Oral at bedtime  cefTRIAXone   IVPB 1000 milliGRAM(s) IV Intermittent every 24 hours  clopidogrel Tablet 75 milliGRAM(s) Oral daily  dextrose 5%. 1000 milliLiter(s) (50 mL/Hr) IV Continuous <Continuous>  dextrose 50% Injectable 12.5 Gram(s) IV Push once  dextrose 50% Injectable 25 Gram(s) IV Push once  dextrose 50% Injectable 25 Gram(s) IV Push once  gabapentin 100 milliGRAM(s) Oral daily  influenza   Vaccine 0.5 milliLiter(s) IntraMuscular once  insulin lispro (HumaLOG) corrective regimen sliding scale   SubCutaneous three times a day before meals  insulin lispro (HumaLOG) corrective regimen sliding scale   SubCutaneous at bedtime  insulin lispro Injectable (HumaLOG) 2 Unit(s) SubCutaneous three times a day before meals  lisinopril 40 milliGRAM(s) Oral daily  metoprolol tartrate 25 milliGRAM(s) Oral every 12 hours  NIFEdipine XL 90 milliGRAM(s) Oral daily  oxybutynin 5 milliGRAM(s) Oral at bedtime  pantoprazole    Tablet 40 milliGRAM(s) Oral before breakfast  senna 2 Tablet(s) Oral at bedtime  tamsulosin 0.4 milliGRAM(s) Oral at bedtime    MEDICATIONS  (PRN):  acetaminophen   Tablet .. 650 milliGRAM(s) Oral every 6 hours PRN Temp greater or equal to 38C (100.4F), Mild Pain (1 - 3)  dextrose 40% Gel 15 Gram(s) Oral once PRN Blood Glucose LESS THAN 70 milliGRAM(s)/deciliter  glucagon  Injectable 1 milliGRAM(s) IntraMuscular once PRN Glucose LESS THAN 70 milligrams/deciliter      I&O's Summary    2019 07:01  -  2019 07:00  --------------------------------------------------------  IN: 120 mL / OUT: 150 mL / NET: -30 mL        Vital Signs Last 24 Hrs  T(C): 36.4 (2019 06:32), Max: 36.7 (2019 14:36)  T(F): 97.6 (2019 06:32), Max: 98.1 (2019 14:36)  HR: 76 (2019 06:32) (73 - 87)  BP: 124/56 (2019 06:32) (111/51 - 136/62)  BP(mean): --  RR: 18 (2019 06:32) (17 - 18)  SpO2: 98% (2019 06:32) (97% - 98%)    CAPILLARY BLOOD GLUCOSE      POCT Blood Glucose.: 304 mg/dL (2019 21:44)  POCT Blood Glucose.: 268 mg/dL (2019 17:29)  POCT Blood Glucose.: 217 mg/dL (2019 12:33)  POCT Blood Glucose.: 162 mg/dL (2019 08:38)      PHYSICAL EXAM:              GENERAL: Cachectic, NAD  	EYES: Conjunctiva and sclera clear  	ENT: dry mucosal membranes, no nasal discharge  	NECK: Supple, No JVD  	CHEST/LUNG: Clear to auscultation bilaterally; No wheeze  	HEART: Regular rate and rhythm; No murmurs, rubs, or gallops  	ABDOMEN: Soft, Nontender, Nondistended; Bowel sounds present  	PSYCH: Nl behavior, nl affect  	NEUROLOGY: AAOx2 (self and place)              EXTREMITIES: No LE edema; +Skin tenting, dry skin.       LABS:                        11.7   15.16 )-----------( 305      ( 2019 11:50 )             35.2     Auto Eosinophil # 0.19  / Auto Eosinophil % 1.3   / Auto Neutrophil # 12.03 / Auto Neutrophil % 79.3  / BANDS % x            136  |  105  |  24<H>  ----------------------------<  230<H>  4.3   |  20<L>  |  1.21    Ca    8.8      2019 11:50  Mg     1.8       Phos  3.2               Urinalysis Basic - ( 2019 16:43 )    Color: YELLOW / Appearance: Lt TURBID / S.017 / pH: 6.0  Gluc: NEGATIVE / Ketone: TRACE  / Bili: NEGATIVE / Urobili: NORMAL   Blood: SMALL / Protein: 50 / Nitrite: NEGATIVE   Leuk Esterase: LARGE / RBC: 3-5 / WBC >50   Sq Epi: OCC / Non Sq Epi: x / Bacteria: FEW      Lactate, Blood: 2.8 mmol/L ( @ 10:00)        RADIOLOGY & ADDITIONAL TESTS:    Imaging Personally Reviewed:    Consultant(s) Notes Reviewed:      Care Discussed with Consultants/Other Providers: Salma Navarro MD  PGY 1 Department of Internal Medicine  Pager: [phone]    Patient is a 74y old  Male who presents with a chief complaint of AMS (2019 07:35)      SUBJECTIVE / OVERNIGHT EVENTS: Pt seen and examined. Overnight UA positive. U culture sig for gram negative rods. This AM pt mildly confused however able to follow instructions. AAO x 2. Denies fevers, chills, N/V/ CP, SOB.       MEDICATIONS  (STANDING):  aspirin  chewable 81 milliGRAM(s) Oral daily  atorvastatin 10 milliGRAM(s) Oral at bedtime  cefTRIAXone   IVPB 1000 milliGRAM(s) IV Intermittent every 24 hours  clopidogrel Tablet 75 milliGRAM(s) Oral daily  dextrose 5%. 1000 milliLiter(s) (50 mL/Hr) IV Continuous <Continuous>  dextrose 50% Injectable 12.5 Gram(s) IV Push once  dextrose 50% Injectable 25 Gram(s) IV Push once  dextrose 50% Injectable 25 Gram(s) IV Push once  gabapentin 100 milliGRAM(s) Oral daily  influenza   Vaccine 0.5 milliLiter(s) IntraMuscular once  insulin lispro (HumaLOG) corrective regimen sliding scale   SubCutaneous three times a day before meals  insulin lispro (HumaLOG) corrective regimen sliding scale   SubCutaneous at bedtime  insulin lispro Injectable (HumaLOG) 2 Unit(s) SubCutaneous three times a day before meals  lisinopril 40 milliGRAM(s) Oral daily  metoprolol tartrate 25 milliGRAM(s) Oral every 12 hours  NIFEdipine XL 90 milliGRAM(s) Oral daily  oxybutynin 5 milliGRAM(s) Oral at bedtime  pantoprazole    Tablet 40 milliGRAM(s) Oral before breakfast  senna 2 Tablet(s) Oral at bedtime  tamsulosin 0.4 milliGRAM(s) Oral at bedtime    MEDICATIONS  (PRN):  acetaminophen   Tablet .. 650 milliGRAM(s) Oral every 6 hours PRN Temp greater or equal to 38C (100.4F), Mild Pain (1 - 3)  dextrose 40% Gel 15 Gram(s) Oral once PRN Blood Glucose LESS THAN 70 milliGRAM(s)/deciliter  glucagon  Injectable 1 milliGRAM(s) IntraMuscular once PRN Glucose LESS THAN 70 milligrams/deciliter      I&O's Summary    2019 07:01  -  2019 07:00  --------------------------------------------------------  IN: 120 mL / OUT: 150 mL / NET: -30 mL        Vital Signs Last 24 Hrs  T(C): 36.4 (2019 06:32), Max: 36.7 (2019 14:36)  T(F): 97.6 (2019 06:32), Max: 98.1 (2019 14:36)  HR: 76 (2019 06:32) (73 - 87)  BP: 124/56 (2019 06:32) (111/51 - 136/62)  BP(mean): --  RR: 18 (2019 06:32) (17 - 18)  SpO2: 98% (2019 06:32) (97% - 98%)    CAPILLARY BLOOD GLUCOSE      POCT Blood Glucose.: 304 mg/dL (2019 21:44)  POCT Blood Glucose.: 268 mg/dL (2019 17:29)  POCT Blood Glucose.: 217 mg/dL (2019 12:33)  POCT Blood Glucose.: 162 mg/dL (2019 08:38)      PHYSICAL EXAM:              GENERAL: Cachectic, NAD  	EYES: Conjunctiva and sclera clear  	ENT: dry mucosal membranes, no nasal discharge  	NECK: Supple, No JVD  	CHEST/LUNG: Clear to auscultation bilaterally; No wheeze  	HEART: Regular rate and rhythm; No murmurs, rubs, or gallops  	ABDOMEN: Soft, Nontender, Nondistended; Bowel sounds present  	PSYCH: Nl behavior, nl affect  	NEUROLOGY: AAOx2 (self and place)              EXTREMITIES: No LE edema; +Skin tenting, dry skin.       LABS:                        11.7   15.16 )-----------( 305      ( 2019 11:50 )             35.2     Auto Eosinophil # 0.19  / Auto Eosinophil % 1.3   / Auto Neutrophil # 12.03 / Auto Neutrophil % 79.3  / BANDS % x        11-17    136  |  105  |  24<H>  ----------------------------<  230<H>  4.3   |  20<L>  |  1.21    Ca    8.8      2019 11:50  Mg     1.8       Phos  3.2               Urinalysis Basic - ( 2019 16:43 )    Color: YELLOW / Appearance: Lt TURBID / S.017 / pH: 6.0  Gluc: NEGATIVE / Ketone: TRACE  / Bili: NEGATIVE / Urobili: NORMAL   Blood: SMALL / Protein: 50 / Nitrite: NEGATIVE   Leuk Esterase: LARGE / RBC: 3-5 / WBC >50   Sq Epi: OCC / Non Sq Epi: x / Bacteria: FEW      Lactate, Blood: 2.8 mmol/L ( @ 10:00)        RADIOLOGY & ADDITIONAL TESTS:    Imaging Personally Reviewed:    Consultant(s) Notes Reviewed:      Care Discussed with Consultants/Other Providers:

## 2019-11-19 DIAGNOSIS — N39.0 URINARY TRACT INFECTION, SITE NOT SPECIFIED: ICD-10-CM

## 2019-11-19 DIAGNOSIS — Z02.9 ENCOUNTER FOR ADMINISTRATIVE EXAMINATIONS, UNSPECIFIED: ICD-10-CM

## 2019-11-19 LAB
-  AMIKACIN: SIGNIFICANT CHANGE UP
-  AMPICILLIN/SULBACTAM: SIGNIFICANT CHANGE UP
-  AMPICILLIN: SIGNIFICANT CHANGE UP
-  AZTREONAM: SIGNIFICANT CHANGE UP
-  CEFAZOLIN: SIGNIFICANT CHANGE UP
-  CEFEPIME: SIGNIFICANT CHANGE UP
-  CEFOXITIN: SIGNIFICANT CHANGE UP
-  CEFTAZIDIME: SIGNIFICANT CHANGE UP
-  CEFTRIAXONE: SIGNIFICANT CHANGE UP
-  ERTAPENEM: SIGNIFICANT CHANGE UP
-  GENTAMICIN: SIGNIFICANT CHANGE UP
-  IMIPENEM: SIGNIFICANT CHANGE UP
-  LEVOFLOXACIN: SIGNIFICANT CHANGE UP
-  MEROPENEM: SIGNIFICANT CHANGE UP
-  NITROFURANTOIN: SIGNIFICANT CHANGE UP
-  PIPERACILLIN/TAZOBACTAM: SIGNIFICANT CHANGE UP
-  TIGECYCLINE: SIGNIFICANT CHANGE UP
-  TOBRAMYCIN: SIGNIFICANT CHANGE UP
-  TRIMETHOPRIM/SULFAMETHOXAZOLE: SIGNIFICANT CHANGE UP
ANION GAP SERPL CALC-SCNC: 14 MMO/L — SIGNIFICANT CHANGE UP (ref 7–14)
BACTERIA UR CULT: SIGNIFICANT CHANGE UP
BUN SERPL-MCNC: 17 MG/DL — SIGNIFICANT CHANGE UP (ref 7–23)
CALCIUM SERPL-MCNC: 8.4 MG/DL — SIGNIFICANT CHANGE UP (ref 8.4–10.5)
CHLORIDE SERPL-SCNC: 111 MMOL/L — HIGH (ref 98–107)
CO2 SERPL-SCNC: 16 MMOL/L — LOW (ref 22–31)
CREAT SERPL-MCNC: 0.87 MG/DL — SIGNIFICANT CHANGE UP (ref 0.5–1.3)
GLUCOSE BLDC GLUCOMTR-MCNC: 111 MG/DL — HIGH (ref 70–99)
GLUCOSE BLDC GLUCOMTR-MCNC: 178 MG/DL — HIGH (ref 70–99)
GLUCOSE BLDC GLUCOMTR-MCNC: 180 MG/DL — HIGH (ref 70–99)
GLUCOSE BLDC GLUCOMTR-MCNC: 276 MG/DL — HIGH (ref 70–99)
GLUCOSE SERPL-MCNC: 229 MG/DL — HIGH (ref 70–99)
MAGNESIUM SERPL-MCNC: 1.6 MG/DL — SIGNIFICANT CHANGE UP (ref 1.6–2.6)
METHOD TYPE: SIGNIFICANT CHANGE UP
ORGANISM # SPEC MICROSCOPIC CNT: SIGNIFICANT CHANGE UP
ORGANISM # SPEC MICROSCOPIC CNT: SIGNIFICANT CHANGE UP
PHOSPHATE SERPL-MCNC: 2.6 MG/DL — SIGNIFICANT CHANGE UP (ref 2.5–4.5)
POTASSIUM SERPL-MCNC: 4.2 MMOL/L — SIGNIFICANT CHANGE UP (ref 3.5–5.3)
POTASSIUM SERPL-SCNC: 4.2 MMOL/L — SIGNIFICANT CHANGE UP (ref 3.5–5.3)
SODIUM SERPL-SCNC: 141 MMOL/L — SIGNIFICANT CHANGE UP (ref 135–145)

## 2019-11-19 PROCEDURE — 99233 SBSQ HOSP IP/OBS HIGH 50: CPT | Mod: GC

## 2019-11-19 RX ADMIN — INSULIN GLARGINE 5 UNIT(S): 100 INJECTION, SOLUTION SUBCUTANEOUS at 22:50

## 2019-11-19 RX ADMIN — Medication 4 UNIT(S): at 18:15

## 2019-11-19 RX ADMIN — Medication 81 MILLIGRAM(S): at 11:42

## 2019-11-19 RX ADMIN — Medication 6: at 13:01

## 2019-11-19 RX ADMIN — CLOPIDOGREL BISULFATE 75 MILLIGRAM(S): 75 TABLET, FILM COATED ORAL at 11:42

## 2019-11-19 RX ADMIN — Medication 4 UNIT(S): at 13:01

## 2019-11-19 RX ADMIN — SODIUM CHLORIDE 75 MILLILITER(S): 9 INJECTION INTRAMUSCULAR; INTRAVENOUS; SUBCUTANEOUS at 03:03

## 2019-11-19 RX ADMIN — TAMSULOSIN HYDROCHLORIDE 0.4 MILLIGRAM(S): 0.4 CAPSULE ORAL at 22:51

## 2019-11-19 RX ADMIN — GABAPENTIN 100 MILLIGRAM(S): 400 CAPSULE ORAL at 11:42

## 2019-11-19 RX ADMIN — Medication 25 MILLIGRAM(S): at 06:02

## 2019-11-19 RX ADMIN — SENNA PLUS 2 TABLET(S): 8.6 TABLET ORAL at 22:52

## 2019-11-19 RX ADMIN — Medication 90 MILLIGRAM(S): at 06:02

## 2019-11-19 RX ADMIN — ATORVASTATIN CALCIUM 10 MILLIGRAM(S): 80 TABLET, FILM COATED ORAL at 22:52

## 2019-11-19 RX ADMIN — Medication 4 UNIT(S): at 09:06

## 2019-11-19 RX ADMIN — Medication 2: at 09:06

## 2019-11-19 RX ADMIN — LISINOPRIL 40 MILLIGRAM(S): 2.5 TABLET ORAL at 06:02

## 2019-11-19 RX ADMIN — Medication 25 MILLIGRAM(S): at 17:51

## 2019-11-19 RX ADMIN — Medication 5 MILLIGRAM(S): at 22:52

## 2019-11-19 RX ADMIN — PANTOPRAZOLE SODIUM 40 MILLIGRAM(S): 20 TABLET, DELAYED RELEASE ORAL at 06:02

## 2019-11-19 NOTE — PROGRESS NOTE ADULT - SUBJECTIVE AND OBJECTIVE BOX
Salma Navarro MD  PGY 1 Department of Internal Medicine  Pager: [phone]    Patient is a 74y old  Male who presents with a chief complaint of AMS (2019 07:01)      SUBJECTIVE / OVERNIGHT EVENTS: Pt seen and examined. No acute overnight events.        MEDICATIONS  (STANDING):  aspirin  chewable 81 milliGRAM(s) Oral daily  atorvastatin 10 milliGRAM(s) Oral at bedtime  cefTRIAXone   IVPB 1000 milliGRAM(s) IV Intermittent every 24 hours  clopidogrel Tablet 75 milliGRAM(s) Oral daily  dextrose 5%. 1000 milliLiter(s) (50 mL/Hr) IV Continuous <Continuous>  dextrose 50% Injectable 12.5 Gram(s) IV Push once  dextrose 50% Injectable 25 Gram(s) IV Push once  dextrose 50% Injectable 25 Gram(s) IV Push once  gabapentin 100 milliGRAM(s) Oral daily  influenza   Vaccine 0.5 milliLiter(s) IntraMuscular once  insulin glargine Injectable (LANTUS) 5 Unit(s) SubCutaneous at bedtime  insulin lispro (HumaLOG) corrective regimen sliding scale   SubCutaneous three times a day before meals  insulin lispro (HumaLOG) corrective regimen sliding scale   SubCutaneous at bedtime  insulin lispro Injectable (HumaLOG) 4 Unit(s) SubCutaneous three times a day before meals  lisinopril 40 milliGRAM(s) Oral daily  metoprolol tartrate 25 milliGRAM(s) Oral every 12 hours  NIFEdipine XL 90 milliGRAM(s) Oral daily  oxybutynin 5 milliGRAM(s) Oral at bedtime  pantoprazole    Tablet 40 milliGRAM(s) Oral before breakfast  senna 2 Tablet(s) Oral at bedtime  sodium chloride 0.9%. 1000 milliLiter(s) (75 mL/Hr) IV Continuous <Continuous>  tamsulosin 0.4 milliGRAM(s) Oral at bedtime    MEDICATIONS  (PRN):  acetaminophen   Tablet .. 650 milliGRAM(s) Oral every 6 hours PRN Temp greater or equal to 38C (100.4F), Mild Pain (1 - 3)  dextrose 40% Gel 15 Gram(s) Oral once PRN Blood Glucose LESS THAN 70 milliGRAM(s)/deciliter  glucagon  Injectable 1 milliGRAM(s) IntraMuscular once PRN Glucose LESS THAN 70 milligrams/deciliter      I&O's Summary    2019 07:01  -  2019 07:00  --------------------------------------------------------  IN: 1350 mL / OUT: 750 mL / NET: 600 mL        Vital Signs Last 24 Hrs  T(C): 36.7 (2019 06:20), Max: 36.7 (2019 06:20)  T(F): 98.1 (2019 06:20), Max: 98.1 (2019 06:20)  HR: 76 (2019 06:20) (73 - 76)  BP: 121/67 (2019 06:20) (118/66 - 133/73)  BP(mean): --  RR: 18 (2019 06:20) (18 - 18)  SpO2: 97% (2019 06:20) (97% - 98%)    CAPILLARY BLOOD GLUCOSE      POCT Blood Glucose.: 184 mg/dL (2019 22:28)  POCT Blood Glucose.: 144 mg/dL (2019 18:23)  POCT Blood Glucose.: 270 mg/dL (2019 11:59)  POCT Blood Glucose.: 279 mg/dL (2019 09:22)      PHYSICAL EXAM:  GENERAL: NAD,   HEAD:  Atraumatic, Normocephalic  EYES: EOMI, PERRL, conjunctiva and sclera clear  NECK: No JVD  CHEST/LUNG: Clear to auscultation bilaterally; No wheeze  HEART: Regular rate and rhythm; No murmurs, rubs, or gallops  ABDOMEN: Soft, Nontender, Nondistended; Bowel sounds present  EXTREMITIES:  2+ Peripheral Pulses, No clubbing, cyanosis, or edema  PSYCH: AAOx3  NEUROLOGY: non-focal  SKIN: No rashes or lesions       LABS:                        11.1   11.99 )-----------( 349      ( 2019 14:40 )             33.3     Auto Eosinophil # 0.33  / Auto Eosinophil % 2.8   / Auto Neutrophil # 8.62  / Auto Neutrophil % 71.9  / BANDS % x                            11.7   15.16 )-----------( 305      ( 2019 11:50 )             35.2     Auto Eosinophil # 0.19  / Auto Eosinophil % 1.3   / Auto Neutrophil # 12.03 / Auto Neutrophil % 79.3  / BANDS % x            142  |  109<H>  |  23  ----------------------------<  224<H>  4.1   |  21<L>  |  1.05      136  |  105  |  24<H>  ----------------------------<  230<H>  4.3   |  20<L>  |  1.21    Ca    9.0      2019 14:40  Mg     1.8       Phos  2.7               Urinalysis Basic - ( 2019 16:43 )    Color: YELLOW / Appearance: Lt TURBID / S.017 / pH: 6.0  Gluc: NEGATIVE / Ketone: TRACE  / Bili: NEGATIVE / Urobili: NORMAL   Blood: SMALL / Protein: 50 / Nitrite: NEGATIVE   Leuk Esterase: LARGE / RBC: 3-5 / WBC >50   Sq Epi: OCC / Non Sq Epi: x / Bacteria: FEW      Lactate, Blood: 2.8 mmol/L ( @ 10:00)        RADIOLOGY & ADDITIONAL TESTS:    Imaging Personally Reviewed:    Consultant(s) Notes Reviewed:      Care Discussed with Consultants/Other Providers: Salma Navarro MD  PGY 1 Department of Internal Medicine  Pager: [phone]    Patient is a 74y old  Male who presents with a chief complaint of AMS (2019 07:01)      SUBJECTIVE / OVERNIGHT EVENTS: Pt seen and examined. No acute overnight events. Endorses foot pain B/L. Denies fevers, chills, N/V, CP, SOB, constipation, diarrhea.         MEDICATIONS  (STANDING):  aspirin  chewable 81 milliGRAM(s) Oral daily  atorvastatin 10 milliGRAM(s) Oral at bedtime  cefTRIAXone   IVPB 1000 milliGRAM(s) IV Intermittent every 24 hours  clopidogrel Tablet 75 milliGRAM(s) Oral daily  dextrose 5%. 1000 milliLiter(s) (50 mL/Hr) IV Continuous <Continuous>  dextrose 50% Injectable 12.5 Gram(s) IV Push once  dextrose 50% Injectable 25 Gram(s) IV Push once  dextrose 50% Injectable 25 Gram(s) IV Push once  gabapentin 100 milliGRAM(s) Oral daily  influenza   Vaccine 0.5 milliLiter(s) IntraMuscular once  insulin glargine Injectable (LANTUS) 5 Unit(s) SubCutaneous at bedtime  insulin lispro (HumaLOG) corrective regimen sliding scale   SubCutaneous three times a day before meals  insulin lispro (HumaLOG) corrective regimen sliding scale   SubCutaneous at bedtime  insulin lispro Injectable (HumaLOG) 4 Unit(s) SubCutaneous three times a day before meals  lisinopril 40 milliGRAM(s) Oral daily  metoprolol tartrate 25 milliGRAM(s) Oral every 12 hours  NIFEdipine XL 90 milliGRAM(s) Oral daily  oxybutynin 5 milliGRAM(s) Oral at bedtime  pantoprazole    Tablet 40 milliGRAM(s) Oral before breakfast  senna 2 Tablet(s) Oral at bedtime  sodium chloride 0.9%. 1000 milliLiter(s) (75 mL/Hr) IV Continuous <Continuous>  tamsulosin 0.4 milliGRAM(s) Oral at bedtime    MEDICATIONS  (PRN):  acetaminophen   Tablet .. 650 milliGRAM(s) Oral every 6 hours PRN Temp greater or equal to 38C (100.4F), Mild Pain (1 - 3)  dextrose 40% Gel 15 Gram(s) Oral once PRN Blood Glucose LESS THAN 70 milliGRAM(s)/deciliter  glucagon  Injectable 1 milliGRAM(s) IntraMuscular once PRN Glucose LESS THAN 70 milligrams/deciliter      I&O's Summary    2019 07:01  -  2019 07:00  --------------------------------------------------------  IN: 1350 mL / OUT: 750 mL / NET: 600 mL        Vital Signs Last 24 Hrs  T(C): 36.7 (2019 06:20), Max: 36.7 (2019 06:20)  T(F): 98.1 (2019 06:20), Max: 98.1 (2019 06:20)  HR: 76 (2019 06:20) (73 - 76)  BP: 121/67 (2019 06:20) (118/66 - 133/73)  BP(mean): --  RR: 18 (2019 06:20) (18 - 18)  SpO2: 97% (2019 06:20) (97% - 98%)    CAPILLARY BLOOD GLUCOSE      POCT Blood Glucose.: 184 mg/dL (2019 22:28)  POCT Blood Glucose.: 144 mg/dL (2019 18:23)  POCT Blood Glucose.: 270 mg/dL (2019 11:59)  POCT Blood Glucose.: 279 mg/dL (2019 09:22)      PHYSICAL EXAM:              GENERAL: Cachectic, NAD  	EYES: Conjunctiva and sclera clear  	ENT: dry mucosal membranes, no nasal discharge  	NECK: Supple, No JVD  	CHEST/LUNG: Clear to auscultation bilaterally; No wheeze  	HEART: Regular rate and rhythm; No murmurs, rubs, or gallops  	ABDOMEN: Soft, Nontender, Nondistended; Bowel sounds present  	PSYCH: Nl behavior, nl affect  	NEUROLOGY: AAOx2 (self and place)              EXTREMITIES: No LE edema; +Skin tenting, dry skin.       LABS:                        11.1   11.99 )-----------( 349      ( 2019 14:40 )             33.3     Auto Eosinophil # 0.33  / Auto Eosinophil % 2.8   / Auto Neutrophil # 8.62  / Auto Neutrophil % 71.9  / BANDS % x                            11.7   15.16 )-----------( 305      ( 2019 11:50 )             35.2     Auto Eosinophil # 0.19  / Auto Eosinophil % 1.3   / Auto Neutrophil # 12.03 / Auto Neutrophil % 79.3  / BANDS % x            142  |  109<H>  |  23  ----------------------------<  224<H>  4.1   |  21<L>  |  1.05      136  |  105  |  24<H>  ----------------------------<  230<H>  4.3   |  20<L>  |  1.21    Ca    9.0      2019 14:40  Mg     1.8       Phos  2.7               Urinalysis Basic - ( 2019 16:43 )    Color: YELLOW / Appearance: Lt TURBID / S.017 / pH: 6.0  Gluc: NEGATIVE / Ketone: TRACE  / Bili: NEGATIVE / Urobili: NORMAL   Blood: SMALL / Protein: 50 / Nitrite: NEGATIVE   Leuk Esterase: LARGE / RBC: 3-5 / WBC >50   Sq Epi: OCC / Non Sq Epi: x / Bacteria: FEW      Lactate, Blood: 2.8 mmol/L ( @ 10:00)        RADIOLOGY & ADDITIONAL TESTS:    Imaging Personally Reviewed:    Consultant(s) Notes Reviewed:      Care Discussed with Consultants/Other Providers:

## 2019-11-19 NOTE — PROGRESS NOTE ADULT - PROBLEM SELECTOR PROBLEM 5
SALBADOR (acute kidney injury) Coronary artery disease involving coronary bypass graft of native heart with angina pectoris

## 2019-11-19 NOTE — PROGRESS NOTE ADULT - PROBLEM SELECTOR PROBLEM 6
Type 2 diabetes mellitus with other specified complication, without long-term current use of insulin Prophylactic measure

## 2019-11-19 NOTE — PROGRESS NOTE ADULT - PROBLEM SELECTOR PLAN 2
Leukocytosis uptrending.  Afebrile  -Likely 2/2 UTI  -CXR negative  -See rest of work up/management above    -Monitor off abx Leukocytosis downtrending. Afebrile  -Likely 2/2 UTI  -CXR negative  -See rest of work up/management above    -Monitor off abx Uncontrolled on ISS  -C/w Lantus 5 units qHS  -Will continue moderate ISS and Humalog 4U pre-meals

## 2019-11-19 NOTE — PROGRESS NOTE ADULT - PROBLEM SELECTOR PLAN 1
Likely 2/2 UTI, U culture sig for Gram negative rods  -Started on IV Ceftriaxone overnight  -Pt w/ up-trending leukocytosis   -F/u sensitivities   -Blood culture NGTD   -F/u B12 and folate Likely 2/2 UTI, U culture sig for Gram negative rods  -c/w IV Ceftriaxone (11/17-  -Pt w/ downtrending leukocytosis   -Urine culture sig for Citrobacter koseri   -F/u sensitivities   -Blood culture NGTD   -B12 elevated, folate wnl Urine culture sig for Citrobacter koseri, resistant to Ceftriaxone  -switch to Levaquin for 7 days total  -Pt w/ downtrending leukocytosis   -Blood culture NGTD   -B12 elevated, folate wnl

## 2019-11-19 NOTE — PROGRESS NOTE ADULT - PROBLEM SELECTOR PLAN 5
Scr elevated to 2-unclear if it is SALBADOR or CKD. Baseline unclear  -SALBADOR now resolved  -US renal sig for increased bilateral cortical echogenicity c/f renal disease. Will benefit from further outpt management   -F/u renal recs Stable   -c/w aspirin and plavix  -c/w metoprolol

## 2019-11-19 NOTE — PROGRESS NOTE ADULT - PROBLEM SELECTOR PLAN 7
C/w oxybutynin  -c/w flomax Transitions of Care Status:  1.  Name of PCP: No PCP  2.  PCP Contacted on Admission: [ ] Y    [X] N    3.  PCP contacted at Discharge: [ ] Y    [ ] N    [ ] N/A  4.  Post-Discharge Appointment Date and Location:  5.  Summary of Handoff given to PCP:

## 2019-11-19 NOTE — PROGRESS NOTE ADULT - PROBLEM SELECTOR PLAN 3
-Acidosis now resolved   -continue fluid repletion as needed   -Monitor VBGs C/w oxybutynin  -c/w flomax

## 2019-11-19 NOTE — PROGRESS NOTE ADULT - PROBLEM SELECTOR PLAN 6
Uncontrolled on ISS  -Levemir 10U at home  -Will continue ISS and  2U pre-meals  -Will start lantus 3u at bedtime HTN  c/w metoprolol       DVT PROPHYLAXIS  Heparin subq

## 2019-11-19 NOTE — PROGRESS NOTE ADULT - PROBLEM SELECTOR PROBLEM 2
SIRS (systemic inflammatory response syndrome) Type 2 diabetes mellitus with other specified complication, without long-term current use of insulin

## 2019-11-19 NOTE — PROGRESS NOTE ADULT - ATTENDING COMMENTS
C/w Ceftriaxone pending urine culture sensitivities. FS better controlled today. D/c planning to [facility] once urine culture results are back and patient can be transitioned to PO abx Urine culture with Citrobacter koseri, resistant to Ceftriaxone. Will start Levaquin for 7 days total. FS better controlled today. D/c planning to [facility]

## 2019-11-20 ENCOUNTER — TRANSCRIPTION ENCOUNTER (OUTPATIENT)
Age: 74
End: 2019-11-20

## 2019-11-20 VITALS
DIASTOLIC BLOOD PRESSURE: 53 MMHG | OXYGEN SATURATION: 95 % | HEART RATE: 71 BPM | RESPIRATION RATE: 17 BRPM | SYSTOLIC BLOOD PRESSURE: 112 MMHG | TEMPERATURE: 98 F

## 2019-11-20 PROBLEM — I63.9 CEREBRAL INFARCTION, UNSPECIFIED: Chronic | Status: ACTIVE | Noted: 2019-11-13

## 2019-11-20 PROBLEM — E78.5 HYPERLIPIDEMIA, UNSPECIFIED: Chronic | Status: ACTIVE | Noted: 2019-11-13

## 2019-11-20 PROBLEM — I25.10 ATHEROSCLEROTIC HEART DISEASE OF NATIVE CORONARY ARTERY WITHOUT ANGINA PECTORIS: Chronic | Status: ACTIVE | Noted: 2019-11-13

## 2019-11-20 PROBLEM — E11.9 TYPE 2 DIABETES MELLITUS WITHOUT COMPLICATIONS: Chronic | Status: ACTIVE | Noted: 2019-11-13

## 2019-11-20 PROBLEM — I10 ESSENTIAL (PRIMARY) HYPERTENSION: Chronic | Status: ACTIVE | Noted: 2019-11-13

## 2019-11-20 LAB
ALBUMIN SERPL ELPH-MCNC: 3 G/DL — LOW (ref 3.3–5)
ALP SERPL-CCNC: 135 U/L — HIGH (ref 40–120)
ALT FLD-CCNC: 31 U/L — SIGNIFICANT CHANGE UP (ref 4–41)
ANION GAP SERPL CALC-SCNC: 15 MMO/L — HIGH (ref 7–14)
AST SERPL-CCNC: 17 U/L — SIGNIFICANT CHANGE UP (ref 4–40)
BASOPHILS # BLD AUTO: 0.07 K/UL — SIGNIFICANT CHANGE UP (ref 0–0.2)
BASOPHILS NFR BLD AUTO: 0.6 % — SIGNIFICANT CHANGE UP (ref 0–2)
BILIRUB SERPL-MCNC: 0.4 MG/DL — SIGNIFICANT CHANGE UP (ref 0.2–1.2)
BUN SERPL-MCNC: 15 MG/DL — SIGNIFICANT CHANGE UP (ref 7–23)
CALCIUM SERPL-MCNC: 9.3 MG/DL — SIGNIFICANT CHANGE UP (ref 8.4–10.5)
CHLORIDE SERPL-SCNC: 107 MMOL/L — SIGNIFICANT CHANGE UP (ref 98–107)
CO2 SERPL-SCNC: 20 MMOL/L — LOW (ref 22–31)
CREAT SERPL-MCNC: 0.93 MG/DL — SIGNIFICANT CHANGE UP (ref 0.5–1.3)
EOSINOPHIL # BLD AUTO: 0.22 K/UL — SIGNIFICANT CHANGE UP (ref 0–0.5)
EOSINOPHIL NFR BLD AUTO: 1.9 % — SIGNIFICANT CHANGE UP (ref 0–6)
GLUCOSE BLDC GLUCOMTR-MCNC: 100 MG/DL — HIGH (ref 70–99)
GLUCOSE BLDC GLUCOMTR-MCNC: 132 MG/DL — HIGH (ref 70–99)
GLUCOSE BLDC GLUCOMTR-MCNC: 191 MG/DL — HIGH (ref 70–99)
GLUCOSE SERPL-MCNC: 190 MG/DL — HIGH (ref 70–99)
HCT VFR BLD CALC: 34.3 % — LOW (ref 39–50)
HGB BLD-MCNC: 11.2 G/DL — LOW (ref 13–17)
IMM GRANULOCYTES NFR BLD AUTO: 1.5 % — SIGNIFICANT CHANGE UP (ref 0–1.5)
LYMPHOCYTES # BLD AUTO: 2.34 K/UL — SIGNIFICANT CHANGE UP (ref 1–3.3)
LYMPHOCYTES # BLD AUTO: 20 % — SIGNIFICANT CHANGE UP (ref 13–44)
MAGNESIUM SERPL-MCNC: 1.6 MG/DL — SIGNIFICANT CHANGE UP (ref 1.6–2.6)
MCHC RBC-ENTMCNC: 27.9 PG — SIGNIFICANT CHANGE UP (ref 27–34)
MCHC RBC-ENTMCNC: 32.7 % — SIGNIFICANT CHANGE UP (ref 32–36)
MCV RBC AUTO: 85.3 FL — SIGNIFICANT CHANGE UP (ref 80–100)
MONOCYTES # BLD AUTO: 0.86 K/UL — SIGNIFICANT CHANGE UP (ref 0–0.9)
MONOCYTES NFR BLD AUTO: 7.4 % — SIGNIFICANT CHANGE UP (ref 2–14)
NEUTROPHILS # BLD AUTO: 8.03 K/UL — HIGH (ref 1.8–7.4)
NEUTROPHILS NFR BLD AUTO: 68.6 % — SIGNIFICANT CHANGE UP (ref 43–77)
NRBC # FLD: 0 K/UL — SIGNIFICANT CHANGE UP (ref 0–0)
PHOSPHATE SERPL-MCNC: 3.2 MG/DL — SIGNIFICANT CHANGE UP (ref 2.5–4.5)
PLATELET # BLD AUTO: 353 K/UL — SIGNIFICANT CHANGE UP (ref 150–400)
PMV BLD: 10.8 FL — SIGNIFICANT CHANGE UP (ref 7–13)
POTASSIUM SERPL-MCNC: 4.2 MMOL/L — SIGNIFICANT CHANGE UP (ref 3.5–5.3)
POTASSIUM SERPL-SCNC: 4.2 MMOL/L — SIGNIFICANT CHANGE UP (ref 3.5–5.3)
PROT SERPL-MCNC: 6.6 G/DL — SIGNIFICANT CHANGE UP (ref 6–8.3)
RBC # BLD: 4.02 M/UL — LOW (ref 4.2–5.8)
RBC # FLD: 13.3 % — SIGNIFICANT CHANGE UP (ref 10.3–14.5)
SODIUM SERPL-SCNC: 142 MMOL/L — SIGNIFICANT CHANGE UP (ref 135–145)
WBC # BLD: 11.69 K/UL — HIGH (ref 3.8–10.5)
WBC # FLD AUTO: 11.69 K/UL — HIGH (ref 3.8–10.5)

## 2019-11-20 PROCEDURE — 99239 HOSP IP/OBS DSCHRG MGMT >30: CPT | Mod: GC

## 2019-11-20 RX ORDER — RAMIPRIL 5 MG
1 CAPSULE ORAL
Qty: 0 | Refills: 0 | DISCHARGE

## 2019-11-20 RX ORDER — INSULIN GLARGINE 100 [IU]/ML
3 INJECTION, SOLUTION SUBCUTANEOUS
Qty: 0 | Refills: 0 | DISCHARGE
Start: 2019-11-20

## 2019-11-20 RX ORDER — LISINOPRIL 2.5 MG/1
1 TABLET ORAL
Qty: 0 | Refills: 0 | DISCHARGE
Start: 2019-11-20

## 2019-11-20 RX ORDER — OXYBUTYNIN CHLORIDE 5 MG
1 TABLET ORAL
Qty: 0 | Refills: 0 | DISCHARGE
Start: 2019-11-20

## 2019-11-20 RX ORDER — METFORMIN HYDROCHLORIDE 850 MG/1
1 TABLET ORAL
Qty: 0 | Refills: 0 | DISCHARGE

## 2019-11-20 RX ORDER — INSULIN GLARGINE 100 [IU]/ML
5 INJECTION, SOLUTION SUBCUTANEOUS
Qty: 0 | Refills: 0 | DISCHARGE
Start: 2019-11-20

## 2019-11-20 RX ORDER — SODIUM CHLORIDE 9 MG/ML
1 INJECTION INTRAMUSCULAR; INTRAVENOUS; SUBCUTANEOUS
Qty: 0 | Refills: 0 | DISCHARGE

## 2019-11-20 RX ADMIN — Medication 90 MILLIGRAM(S): at 07:10

## 2019-11-20 RX ADMIN — Medication 4 UNIT(S): at 13:19

## 2019-11-20 RX ADMIN — Medication 81 MILLIGRAM(S): at 13:21

## 2019-11-20 RX ADMIN — Medication 25 MILLIGRAM(S): at 18:13

## 2019-11-20 RX ADMIN — CLOPIDOGREL BISULFATE 75 MILLIGRAM(S): 75 TABLET, FILM COATED ORAL at 13:21

## 2019-11-20 RX ADMIN — Medication 4 UNIT(S): at 18:13

## 2019-11-20 RX ADMIN — Medication 4 UNIT(S): at 09:37

## 2019-11-20 RX ADMIN — LISINOPRIL 40 MILLIGRAM(S): 2.5 TABLET ORAL at 07:10

## 2019-11-20 RX ADMIN — PANTOPRAZOLE SODIUM 40 MILLIGRAM(S): 20 TABLET, DELAYED RELEASE ORAL at 07:10

## 2019-11-20 RX ADMIN — Medication 25 MILLIGRAM(S): at 07:10

## 2019-11-20 RX ADMIN — Medication 2: at 09:37

## 2019-11-20 RX ADMIN — GABAPENTIN 100 MILLIGRAM(S): 400 CAPSULE ORAL at 13:21

## 2019-11-20 NOTE — PROGRESS NOTE ADULT - SUBJECTIVE AND OBJECTIVE BOX
Salma Navarro MD  PGY 1 Department of Internal Medicine  Pager: [phone]    Patient is a 74y old  Male who presents with a chief complaint of AMS (19 Nov 2019 08:45)      SUBJECTIVE / OVERNIGHT EVENTS: Pt seen and examined. No acute overnight events.        MEDICATIONS  (STANDING):  aspirin  chewable 81 milliGRAM(s) Oral daily  atorvastatin 10 milliGRAM(s) Oral at bedtime  clopidogrel Tablet 75 milliGRAM(s) Oral daily  dextrose 5%. 1000 milliLiter(s) (50 mL/Hr) IV Continuous <Continuous>  dextrose 50% Injectable 12.5 Gram(s) IV Push once  dextrose 50% Injectable 25 Gram(s) IV Push once  dextrose 50% Injectable 25 Gram(s) IV Push once  gabapentin 100 milliGRAM(s) Oral daily  influenza   Vaccine 0.5 milliLiter(s) IntraMuscular once  insulin glargine Injectable (LANTUS) 5 Unit(s) SubCutaneous at bedtime  insulin lispro (HumaLOG) corrective regimen sliding scale   SubCutaneous three times a day before meals  insulin lispro (HumaLOG) corrective regimen sliding scale   SubCutaneous at bedtime  insulin lispro Injectable (HumaLOG) 4 Unit(s) SubCutaneous three times a day before meals  levoFLOXacin  Tablet 250 milliGRAM(s) Oral every 24 hours  lisinopril 40 milliGRAM(s) Oral daily  metoprolol tartrate 25 milliGRAM(s) Oral every 12 hours  NIFEdipine XL 90 milliGRAM(s) Oral daily  oxybutynin 5 milliGRAM(s) Oral at bedtime  pantoprazole    Tablet 40 milliGRAM(s) Oral before breakfast  senna 2 Tablet(s) Oral at bedtime  tamsulosin 0.4 milliGRAM(s) Oral at bedtime    MEDICATIONS  (PRN):  acetaminophen   Tablet .. 650 milliGRAM(s) Oral every 6 hours PRN Temp greater or equal to 38C (100.4F), Mild Pain (1 - 3)  dextrose 40% Gel 15 Gram(s) Oral once PRN Blood Glucose LESS THAN 70 milliGRAM(s)/deciliter  glucagon  Injectable 1 milliGRAM(s) IntraMuscular once PRN Glucose LESS THAN 70 milligrams/deciliter      I&O's Summary    18 Nov 2019 07:01  -  19 Nov 2019 07:00  --------------------------------------------------------  IN: 1350 mL / OUT: 750 mL / NET: 600 mL        Vital Signs Last 24 Hrs  T(C): 36.7 (19 Nov 2019 21:50), Max: 36.7 (19 Nov 2019 21:50)  T(F): 98 (19 Nov 2019 21:50), Max: 98 (19 Nov 2019 21:50)  HR: 68 (19 Nov 2019 21:50) (68 - 82)  BP: 136/56 (19 Nov 2019 21:50) (108/50 - 139/65)  BP(mean): --  RR: 17 (19 Nov 2019 21:50) (16 - 18)  SpO2: 100% (19 Nov 2019 21:50) (97% - 100%)    CAPILLARY BLOOD GLUCOSE      POCT Blood Glucose.: 178 mg/dL (19 Nov 2019 22:08)  POCT Blood Glucose.: 111 mg/dL (19 Nov 2019 17:57)  POCT Blood Glucose.: 276 mg/dL (19 Nov 2019 12:30)  POCT Blood Glucose.: 180 mg/dL (19 Nov 2019 08:59)      PHYSICAL EXAM:              GENERAL: Cachectic, NAD  	EYES: Conjunctiva and sclera clear  	ENT: dry mucosal membranes, no nasal discharge  	NECK: Supple, No JVD  	CHEST/LUNG: Clear to auscultation bilaterally; No wheeze  	HEART: Regular rate and rhythm; No murmurs, rubs, or gallops  	ABDOMEN: Soft, Nontender, Nondistended; Bowel sounds present  	PSYCH: Nl behavior, nl affect  	NEUROLOGY: AAOx2 (self and place)              EXTREMITIES: No LE edema; +Skin tenting, dry skin.    LABS:                        11.1   11.99 )-----------( 349      ( 18 Nov 2019 14:40 )             33.3     Auto Eosinophil # 0.33  / Auto Eosinophil % 2.8   / Auto Neutrophil # 8.62  / Auto Neutrophil % 71.9  / BANDS % x        11-19    141  |  111<H>  |  17  ----------------------------<  229<H>  4.2   |  16<L>  |  0.87  11-18    142  |  109<H>  |  23  ----------------------------<  224<H>  4.1   |  21<L>  |  1.05    Ca    8.4      19 Nov 2019 10:14  Mg     1.6     11-19  Phos  2.6     11-19            Lactate, Blood: 2.8 mmol/L (11-13 @ 10:00)        RADIOLOGY & ADDITIONAL TESTS:    Imaging Personally Reviewed:    Consultant(s) Notes Reviewed:      Care Discussed with Consultants/Other Providers: Salma Navarro MD  PGY 1 Department of Internal Medicine  Pager: [phone]    Patient is a 74y old  Male who presents with a chief complaint of AMS (19 Nov 2019 08:45)      SUBJECTIVE / OVERNIGHT EVENTS: Pt seen and examined. No acute overnight events. Denies fevers, chills, CP, SOB, Abdominal pain, constipation, diarrhea.         MEDICATIONS  (STANDING):  aspirin  chewable 81 milliGRAM(s) Oral daily  atorvastatin 10 milliGRAM(s) Oral at bedtime  clopidogrel Tablet 75 milliGRAM(s) Oral daily  dextrose 5%. 1000 milliLiter(s) (50 mL/Hr) IV Continuous <Continuous>  dextrose 50% Injectable 12.5 Gram(s) IV Push once  dextrose 50% Injectable 25 Gram(s) IV Push once  dextrose 50% Injectable 25 Gram(s) IV Push once  gabapentin 100 milliGRAM(s) Oral daily  influenza   Vaccine 0.5 milliLiter(s) IntraMuscular once  insulin glargine Injectable (LANTUS) 5 Unit(s) SubCutaneous at bedtime  insulin lispro (HumaLOG) corrective regimen sliding scale   SubCutaneous three times a day before meals  insulin lispro (HumaLOG) corrective regimen sliding scale   SubCutaneous at bedtime  insulin lispro Injectable (HumaLOG) 4 Unit(s) SubCutaneous three times a day before meals  levoFLOXacin  Tablet 250 milliGRAM(s) Oral every 24 hours  lisinopril 40 milliGRAM(s) Oral daily  metoprolol tartrate 25 milliGRAM(s) Oral every 12 hours  NIFEdipine XL 90 milliGRAM(s) Oral daily  oxybutynin 5 milliGRAM(s) Oral at bedtime  pantoprazole    Tablet 40 milliGRAM(s) Oral before breakfast  senna 2 Tablet(s) Oral at bedtime  tamsulosin 0.4 milliGRAM(s) Oral at bedtime    MEDICATIONS  (PRN):  acetaminophen   Tablet .. 650 milliGRAM(s) Oral every 6 hours PRN Temp greater or equal to 38C (100.4F), Mild Pain (1 - 3)  dextrose 40% Gel 15 Gram(s) Oral once PRN Blood Glucose LESS THAN 70 milliGRAM(s)/deciliter  glucagon  Injectable 1 milliGRAM(s) IntraMuscular once PRN Glucose LESS THAN 70 milligrams/deciliter      I&O's Summary    18 Nov 2019 07:01  -  19 Nov 2019 07:00  --------------------------------------------------------  IN: 1350 mL / OUT: 750 mL / NET: 600 mL        Vital Signs Last 24 Hrs  T(C): 36.7 (19 Nov 2019 21:50), Max: 36.7 (19 Nov 2019 21:50)  T(F): 98 (19 Nov 2019 21:50), Max: 98 (19 Nov 2019 21:50)  HR: 68 (19 Nov 2019 21:50) (68 - 82)  BP: 136/56 (19 Nov 2019 21:50) (108/50 - 139/65)  BP(mean): --  RR: 17 (19 Nov 2019 21:50) (16 - 18)  SpO2: 100% (19 Nov 2019 21:50) (97% - 100%)    CAPILLARY BLOOD GLUCOSE      POCT Blood Glucose.: 178 mg/dL (19 Nov 2019 22:08)  POCT Blood Glucose.: 111 mg/dL (19 Nov 2019 17:57)  POCT Blood Glucose.: 276 mg/dL (19 Nov 2019 12:30)  POCT Blood Glucose.: 180 mg/dL (19 Nov 2019 08:59)      PHYSICAL EXAM:              GENERAL: Cachectic, NAD  	EYES: Conjunctiva and sclera clear  	ENT: dry mucosal membranes, no nasal discharge  	NECK: Supple, No JVD  	CHEST/LUNG: Clear to auscultation bilaterally; No wheeze  	HEART: Regular rate and rhythm; No murmurs, rubs, or gallops  	ABDOMEN: Soft, Nontender, Nondistended; Bowel sounds present  	PSYCH: Nl behavior, nl affect  	NEUROLOGY: AAOx2 (self and place)              EXTREMITIES: No LE edema; +Skin tenting, dry skin.    LABS:                        11.1   11.99 )-----------( 349      ( 18 Nov 2019 14:40 )             33.3     Auto Eosinophil # 0.33  / Auto Eosinophil % 2.8   / Auto Neutrophil # 8.62  / Auto Neutrophil % 71.9  / BANDS % x        11-19    141  |  111<H>  |  17  ----------------------------<  229<H>  4.2   |  16<L>  |  0.87  11-18    142  |  109<H>  |  23  ----------------------------<  224<H>  4.1   |  21<L>  |  1.05    Ca    8.4      19 Nov 2019 10:14  Mg     1.6     11-19  Phos  2.6     11-19            Lactate, Blood: 2.8 mmol/L (11-13 @ 10:00)        RADIOLOGY & ADDITIONAL TESTS:    Imaging Personally Reviewed:    Consultant(s) Notes Reviewed:      Care Discussed with Consultants/Other Providers:

## 2019-11-20 NOTE — DISCHARGE NOTE PROVIDER - NSDCMRMEDTOKEN_GEN_ALL_CORE_FT
acetaminophen 325 mg oral tablet: 2 tab(s) orally every 6 hours, As Needed for pain  aspirin 81 mg oral tablet: 1 tab(s) orally once a day  Colace 100 mg oral capsule: 1 cap(s) orally 2 times a day  gabapentin 100 mg oral tablet: 1 tab(s) orally once a day (at bedtime)  Glucophage 1000 mg oral tablet: 1 tab(s) orally 2 times a day  HumaLOG 100 units/mL subcutaneous solution: Humalog sliding scale  Lipitor 10 mg oral tablet: 1 tab(s) orally once a day  magnesium oxide 400 mg (241.3 mg elemental magnesium) oral tablet: 1 tab(s) orally once a day  Metoprolol Tartrate 25 mg oral tablet: 1 tab(s) orally 2 times a day  NIFEdipine 90 mg oral tablet, extended release: 1 tab(s) orally once a day  Plavix 75 mg oral tablet: 1 tab(s) orally once a day  Protonix 40 mg oral delayed release tablet: 1 tab(s) orally once a day  ramipril 10 mg oral capsule: 1 cap(s) orally once a day  Senna 8.6 mg oral tablet: 1 tab(s) orally once a day (at bedtime)  Sodium Chloride 1 g oral tablet: 1 tab(s) orally once a day  tamsulosin 0.4 mg oral capsule: 1 cap(s) orally once a day acetaminophen 325 mg oral tablet: 2 tab(s) orally every 6 hours, As Needed for pain  aspirin 81 mg oral tablet: 1 tab(s) orally once a day  Colace 100 mg oral capsule: 1 cap(s) orally 2 times a day  gabapentin 100 mg oral tablet: 1 tab(s) orally once a day (at bedtime)  HumaLOG 100 units/mL subcutaneous solution: Humalog sliding scale  insulin glargine: 5 unit(s) subcutaneously once a day (at bedtime)  levoFLOXacin 250 mg oral tablet: 1 tab(s) orally every 24 hours  Lipitor 10 mg oral tablet: 1 tab(s) orally once a day  magnesium oxide 400 mg (241.3 mg elemental magnesium) oral tablet: 1 tab(s) orally once a day  Metoprolol Tartrate 25 mg oral tablet: 1 tab(s) orally 2 times a day  NIFEdipine 90 mg oral tablet, extended release: 1 tab(s) orally once a day  oxybutynin 5 mg oral tablet: 1 tab(s) orally once a day (at bedtime)  Plavix 75 mg oral tablet: 1 tab(s) orally once a day  Protonix 40 mg oral delayed release tablet: 1 tab(s) orally once a day  Senna 8.6 mg oral tablet: 1 tab(s) orally once a day (at bedtime)  Sodium Chloride 1 g oral tablet: 1 tab(s) orally once a day  tamsulosin 0.4 mg oral capsule: 1 cap(s) orally once a day  Zestril 40 mg oral tablet: 1 tab(s) orally once a day acetaminophen 325 mg oral tablet: 2 tab(s) orally every 6 hours, As Needed for pain  aspirin 81 mg oral tablet: 1 tab(s) orally once a day  Colace 100 mg oral capsule: 1 cap(s) orally 2 times a day  gabapentin 100 mg oral tablet: 1 tab(s) orally once a day (at bedtime)  HumaLOG 100 units/mL subcutaneous solution: Humalog sliding scale  insulin glargine: 5 unit(s) subcutaneously once a day (at bedtime)  levoFLOXacin 250 mg oral tablet: 1 tab(s) orally every 24 hours  Lipitor 10 mg oral tablet: 1 tab(s) orally once a day  magnesium oxide 400 mg (241.3 mg elemental magnesium) oral tablet: 1 tab(s) orally once a day  Metoprolol Tartrate 25 mg oral tablet: 1 tab(s) orally 2 times a day  NIFEdipine 90 mg oral tablet, extended release: 1 tab(s) orally once a day  oxybutynin 5 mg oral tablet: 1 tab(s) orally once a day (at bedtime)  Plavix 75 mg oral tablet: 1 tab(s) orally once a day  Protonix 40 mg oral delayed release tablet: 1 tab(s) orally once a day  Senna 8.6 mg oral tablet: 1 tab(s) orally once a day (at bedtime)  tamsulosin 0.4 mg oral capsule: 1 cap(s) orally once a day  Zestril 40 mg oral tablet: 1 tab(s) orally once a day

## 2019-11-20 NOTE — DISCHARGE NOTE NURSING/CASE MANAGEMENT/SOCIAL WORK - PATIENT PORTAL LINK FT
You can access the FollowMyHealth Patient Portal offered by [facility] by registering at the following website: [email or web address]. By joining [facility]’s FollowMyHealth portal, you will also be able to view your health information using other applications (apps) compatible with our system.

## 2019-11-20 NOTE — PROGRESS NOTE ADULT - PROBLEM SELECTOR PLAN 1
Urine culture sig for Citrobacter koseri, resistant to Ceftriaxone  -switch to Levaquin for 7 days total  -Pt w/ downtrending leukocytosis   -Blood culture NGTD   -B12 elevated, folate wnl Urine culture sig for Citrobacter koseri, resistant to Ceftriaxone  -c/w Levaquin for 7 days total (11/19-  -Pt w/ downtrending leukocytosis   -Blood culture NGTD   -B12 elevated, folate wnl

## 2019-11-20 NOTE — DISCHARGE NOTE NURSING/CASE MANAGEMENT/SOCIAL WORK - NSDCPEPTSTRK_GEN_ALL_CORE
Stroke warning signs and symptoms/Signs and symptoms of stroke/Call 911 for stroke/Stroke support groups for patients, families, and friends/Need for follow up after discharge/Stroke education booklet/Risk factors for stroke/Prescribed medications

## 2019-11-20 NOTE — CHART NOTE - NSCHARTNOTEFT_GEN_A_CORE
Pt to resume regular diet at his rehabilitation . His dietary preference is Halal. He would benefit from glucerna as per Nutrition, TID.      Celso Campos M.D.

## 2019-11-20 NOTE — DISCHARGE NOTE NURSING/CASE MANAGEMENT/SOCIAL WORK - NSDCPEWEB_GEN_ALL_CORE
[place] website --- [email or web address] for Tobacco Control website --- [email or web address]

## 2019-11-20 NOTE — DISCHARGE NOTE PROVIDER - PROVIDER TOKENS
FREE:[LAST:[Andres],FIRST:[Celso],PHONE:[(   )    -],FAX:[(   )    -],ADDRESS:[Dr Celso Campos  [address] [place] [address]  [phone]]] FREE:[LAST:[Jemal],PHONE:[(   )    -],FAX:[(   )    -],ADDRESS:[Juan Carlos Joaquin  [address] [place] [address]  [phone]]]

## 2019-11-20 NOTE — CHART NOTE - NSCHARTNOTEFT_GEN_A_CORE
Source: Patient [x] Medical record reviewed. Patient seen for severe malnutrition/length of stay nutrition follow-up. Patient reports having a bad appetite. RN reports patient consumed 50% breakfast this morning. Patient reports that he likes to drink Glucerna Shakes, can consider increasing provision of oral nutrition supplements to optimize PO intake. No GI distress (nausea/vomiting/diarrhea/constipation) noted at this time.     Current Diet : Diet, Dysphagia 2 Mechanical Soft-Thin Liquids:   Consistent Carbohydrate {No Snacks} (CSTCHO)  Halal  Supplement Feeding Modality:  Oral  Glucerna Shake Cans or Servings Per Day:  1       Frequency:  Two Times a day (11-15-19 @ 09:21)  PO intake:  < 50% [ ] 50-75% [x]   % [ ]  other :  Current Weight: 46.3kg (11/20), 45.2kg (11/13)  * Noted 1.1kg weight increase, possible weight gain versus bed-scale discrepancy    __________________ Pertinent Medications__________________   MEDICATIONS  (STANDING):  aspirin  chewable 81 milliGRAM(s) Oral daily  atorvastatin 10 milliGRAM(s) Oral at bedtime  clopidogrel Tablet 75 milliGRAM(s) Oral daily  dextrose 5%. 1000 milliLiter(s) (50 mL/Hr) IV Continuous <Continuous>  dextrose 50% Injectable 12.5 Gram(s) IV Push once  dextrose 50% Injectable 25 Gram(s) IV Push once  dextrose 50% Injectable 25 Gram(s) IV Push once  gabapentin 100 milliGRAM(s) Oral daily  influenza   Vaccine 0.5 milliLiter(s) IntraMuscular once  insulin glargine Injectable (LANTUS) 5 Unit(s) SubCutaneous at bedtime  insulin lispro (HumaLOG) corrective regimen sliding scale   SubCutaneous three times a day before meals  insulin lispro (HumaLOG) corrective regimen sliding scale   SubCutaneous at bedtime  insulin lispro Injectable (HumaLOG) 4 Unit(s) SubCutaneous three times a day before meals  levoFLOXacin  Tablet 250 milliGRAM(s) Oral every 24 hours  lisinopril 40 milliGRAM(s) Oral daily  metoprolol tartrate 25 milliGRAM(s) Oral every 12 hours  NIFEdipine XL 90 milliGRAM(s) Oral daily  oxybutynin 5 milliGRAM(s) Oral at bedtime  pantoprazole    Tablet 40 milliGRAM(s) Oral before breakfast  senna 2 Tablet(s) Oral at bedtime  tamsulosin 0.4 milliGRAM(s) Oral at bedtime    MEDICATIONS  (PRN):  acetaminophen   Tablet .. 650 milliGRAM(s) Oral every 6 hours PRN Temp greater or equal to 38C (100.4F), Mild Pain (1 - 3)  dextrose 40% Gel 15 Gram(s) Oral once PRN Blood Glucose LESS THAN 70 milliGRAM(s)/deciliter  glucagon  Injectable 1 milliGRAM(s) IntraMuscular once PRN Glucose LESS THAN 70 milligrams/deciliter    __________________ Pertinent Labs__________________   11-20 Na142 mmol/L Glu 190 mg/dL<H> K+ 4.2 mmol/L Cr  0.93 mg/dL BUN 15 mg/dL 11-20 Phos 3.2 mg/dL 11-20 Alb 3.0 g/dL<L> 11-15 [id number] 7.4 %<H>  CAPILLARY BLOOD GLUCOSE  POCT Blood Glucose.: 191 mg/dL (20 Nov 2019 08:47)  POCT Blood Glucose.: 178 mg/dL (19 Nov 2019 22:08)  POCT Blood Glucose.: 111 mg/dL (19 Nov 2019 17:57)  POCT Blood Glucose.: 276 mg/dL (19 Nov 2019 12:30)    Skin: No pressure ulcers/DTI noted in flowsheets.   Edema: none noted.    Estimated Needs:   [x] no change since previous assessment  [ ] recalculated:     Previous Nutrition Diagnoses:   [x] Inadequate Energy Intake  [x] Underweight   [x] Malnutrition   Nutrition Diagnosis is [x] ongoing  [ ] resolved [ ] not applicable     Goal(s):  1. Patient to meet > 75% estimated pro/kcal needs.    Interventions:   1. Continue Dysphagia 2 Mechanical Soft - thin liquids, Consistent Carbohydrate, Halal diet.   2. Recommend to increase oral nutrition supplement to Glucerna Therapeutic Nutrition Shake 240mls 3x daily (660kcals, 30g protein).  3. Please Encourage po intake, assist with meals and menu selections, provide alternatives PRN.     Monitoring and Evaluation:   1. Monitor weights, labs, BM's, skin integrity, p.o. intake.  Follow-up per department protocol.   RD to remain available, Caroline Charlton RD, CDN - Pager #[phone]

## 2019-11-20 NOTE — DISCHARGE NOTE NURSING/CASE MANAGEMENT/SOCIAL WORK - NSDCPNINST_GEN_ALL_CORE
patient is alert and oriented x3-4 able to make needs known. patient admitted with AMS with encephalopathy with hx of HLD, CVA, DM. patient is stable for the time of dc. patient is denies any pain or discomfort. patient being dc to rehab.

## 2019-11-20 NOTE — DISCHARGE NOTE PROVIDER - NSDCCPCAREPLAN_GEN_ALL_CORE_FT
PRINCIPAL DISCHARGE DIAGNOSIS  Diagnosis: Encephalopathy  Assessment and Plan of Treatment: You were brought in to the hospital due to concern for increased confusion. This was thought to be due to increased levels of acid in your blood, increased sodium levels as well as a urinary tract infection. The  increased levels of acid in your blood was likely due to metformin use. Metformin was discontinued and you were treated with fluids which led to resolution of your acidosis. Your sodium levels also returned to normal following fluid repletion. For your infection, you were managed with a course of antibiotics. Please continue to take all your medications as recommended following your discharge. Please make an appointment to see your primary care doctor 1-2 weeks following your discharge for further work up and management of other possible causes of altered mental status such as dementia. PRINCIPAL DISCHARGE DIAGNOSIS  Diagnosis: Encephalopathy  Assessment and Plan of Treatment: You were brought in to the hospital due to concern for increased confusion. This was thought to be due to increased levels of acid in your blood, increased sodium levels as well as a urinary tract infection. The  increased levels of acid in your blood was likely due to metformin use. Metformin was held and you were treated with fluids which led to resolution of your acidosis. Your sodium levels also returned to normal following fluid repletion. For your infection, you were managed with a course of antibiotics. Please continue to take all your medications as recommended following your discharge. Please make an appointment to see your primary care doctor 1-2 weeks following your discharge for further work up and management of other possible causes of altered mental status such as dementia. PRINCIPAL DISCHARGE DIAGNOSIS  Diagnosis: Encephalopathy  Assessment and Plan of Treatment: You were brought in to the hospital due to concern for increased confusion. This was thought to be due to increased levels of acid in your blood, increased sodium levels as well as a urinary tract infection. The  increased levels of acid in your blood was likely due to metformin use. Metformin was held and you were treated with fluids which led to resolution of your acidosis. Your sodium levels also returned to normal following fluid repletion. For your infection, you were managed with a course of antibiotics. Please continue to take all your medications as recommended following your discharge. Continue levaqun for a total of 7 days ; through 11/26/19. Please make an appointment to see your primary care doctor 1-2 weeks following your discharge for further work up and management of other possible causes of altered mental status such as dementia.

## 2019-11-20 NOTE — PROGRESS NOTE ADULT - ATTENDING COMMENTS
Antibiotics switched from Ceftriaxone to Levaquin yesterday after sensitivities for Citrobacter koseri returned. Plan for 7 day course. D/c planning to [facility]

## 2019-11-20 NOTE — DISCHARGE NOTE PROVIDER - NSDCFUSCHEDAPPT_GEN_ALL_CORE_FT
MIKE BIANCHI ; 12/04/2019 ; N[place] Intmed OP [id number] [facility] MIKE BIANCHI ; 12/04/2019 ; NPP Intmed OP [id number] [facility] MIKE BIANCHI ; 12/04/2019 ; NPP Intmed OP [id number] [facility] MIKE GONZALEZ ; 12/04/2019 ; NPP Intmed OP [id number] [facility]

## 2019-11-20 NOTE — DISCHARGE NOTE PROVIDER - CARE PROVIDER_API CALL
Celso Campos [address]  [address] [place], [place] [address] [phone]  Phone: (   )    -  Fax: (   )    -  Follow Up Time: Juan Carlos Joaquin  [address] [place], [place] [address] [phone]  Phone: (   )    -  Fax: (   )    -  Follow Up Time:

## 2019-11-20 NOTE — DISCHARGE NOTE NURSING/CASE MANAGEMENT/SOCIAL WORK - NSDCPEEMAIL_GEN_ALL_CORE
[facility] for Tobacco Control email [email or web address]

## 2019-11-20 NOTE — DISCHARGE NOTE PROVIDER - HOSPITAL COURSE
73 yo M with PMhx of vertebro-basilar artery syndrome, CAD s/p CABG, DMII, HTN, HLD, OA, urge incontinence, TIA, and recurrent falls presents from rehab center with AMS, hypothermia, and hypotension. As per son at bedside, patient was recently admitted to the [facility] on 11/5/2019 after being found unconsciousness on the floor in his apartment. He was then found to have hyponatremia and septic due to UTI. His course was further complicated by volume overload and as result, he was diuresed. His mental status gradually improved his baseline and was discharged to rehab on 11/8/2019. At rehab, he was making good progress with physical therapy and was at his baseline mental status. However, on 11/12/2019 he became lethargic and minimally unresponsive, prompting the staff to send him to ED.     In the ED, his vitals were; /33 mmHG, T 98.1 F, Hr 78/min, RR 16 /min, SO2 96%. His labs were significant for leukocytosis fo 12.51, hypernatremia of 150, metabolic acidosis with AG of 20, lactate of 9.5, and Scr of 1.98. He was given 1L of NS and repeat lactate decreased to 8.5. His mental status gradually improved to AAOx1-2.  Patient admitted to floors for AMS likely 2/2 metabolic encephalopathy. On the floors patient given fluid repletion. Lactic acidosis and hypernatremia resolved. Patient was also found to have a positive UA, managed w/ antibiotics. Blood cultures NGTD. Patient now hemodynamically stable for discharge to rehab 73 yo M with PMhx of vertebro-basilar artery syndrome, CAD s/p CABG, DMII, HTN, HLD, OA, urge incontinence, TIA, and recurrent falls presents from rehab center with AMS, hypothermia, and hypotension. As per son at bedside, patient was recently admitted to the [facility] on 11/5/2019 after being found unconsciousness on the floor in his apartment. He was then found to have hyponatremia and septic due to UTI. His course was further complicated by volume overload and as result, he was diuresed. His mental status gradually improved his baseline and was discharged to rehab on 11/8/2019. At rehab, he was making good progress with physical therapy and was at his baseline mental status. However, on 11/12/2019 he became lethargic and minimally unresponsive, prompting the staff to send him to ED.     In the ED, his vitals were; /33 mmHG, T 98.1 F, Hr 78/min, RR 16 /min, SO2 96%. His labs were significant for leukocytosis fo 12.51, hypernatremia of 150, metabolic acidosis with AG of 20, lactate of 9.5, and Scr of 1.98. He was given 1L of NS and repeat lactate decreased to 8.5. His mental status gradually improved to AAOx1-2.  Patient admitted to floors for AMS likely 2/2 metabolic encephalopathy. On the floors patient given fluid repletion. Lactic acidosis and hypernatremia resolved. Patient was also found to have a positive UA, managed w/ antibiotics. Blood cultures NGTD. Patient now hemodynamically stable for discharge to rehab.

## 2019-11-20 NOTE — PROGRESS NOTE ADULT - PROBLEM SELECTOR PLAN 7
Transitions of Care Status:  1.  Name of PCP: No PCP  2.  PCP Contacted on Admission: [ ] Y    [X] N    3.  PCP contacted at Discharge: [ ] Y    [ ] N    [ ] N/A  4.  Post-Discharge Appointment Date and Location:  5.  Summary of Handoff given to PCP: Transitions of Care Status:  1.  Name of PCP: No PCP. Pt assigned to f/u w/ Dr. Celso Campos  2.  PCP Contacted on Admission: [ ] Y    [X] N    3.  PCP contacted at Discharge: [ x] Y    [ ] N    [ ] N/A  4.  Post-Discharge Appointment Date and Location:  5.  Summary of Handoff given to PCP: Transitions of Care Status:  1.  Name of PCP: No PCP. Pt assigned to f/u w/ [facility] 12/4/19  2.  PCP Contacted on Admission: [ ] Y    [X] N    3.  PCP contacted at Discharge: [ x] Y    [ ] N    [ ] N/A  4.  Post-Discharge Appointment Date and Location:  5.  Summary of Handoff given to PCP:

## 2019-12-02 PROBLEM — Z00.00 ENCOUNTER FOR PREVENTIVE HEALTH EXAMINATION: Status: ACTIVE | Noted: 2019-12-02

## 2019-12-04 ENCOUNTER — APPOINTMENT (OUTPATIENT)
Dept: INTERNAL MEDICINE | Facility: CLINIC | Age: 74
End: 2019-12-04

## 2020-01-01 ENCOUNTER — OUTPATIENT (OUTPATIENT)
Dept: OUTPATIENT SERVICES | Facility: HOSPITAL | Age: 75
LOS: 1 days | End: 2020-01-01
Payer: MEDICAID

## 2020-01-01 DIAGNOSIS — Z95.1 PRESENCE OF AORTOCORONARY BYPASS GRAFT: Chronic | ICD-10-CM

## 2020-01-01 PROCEDURE — G9001: CPT

## 2020-01-15 ENCOUNTER — INPATIENT (INPATIENT)
Facility: HOSPITAL | Age: 75
LOS: 8 days | Discharge: NURSING, REHAB OR LONG-TERM CARE | End: 2020-01-24
Attending: HOSPITALIST | Admitting: HOSPITALIST
Payer: MEDICAID

## 2020-01-15 VITALS
HEART RATE: 64 BPM | SYSTOLIC BLOOD PRESSURE: 96 MMHG | TEMPERATURE: 98 F | RESPIRATION RATE: 20 BRPM | OXYGEN SATURATION: 100 % | DIASTOLIC BLOOD PRESSURE: 55 MMHG
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
## 2020-01-15 DIAGNOSIS — Z95.1 PRESENCE OF AORTOCORONARY BYPASS GRAFT: Chronic | ICD-10-CM

## 2020-01-15 LAB
ALBUMIN SERPL ELPH-MCNC: 3.6 G/DL — SIGNIFICANT CHANGE UP (ref 3.3–5)
ALP SERPL-CCNC: 97 U/L — SIGNIFICANT CHANGE UP (ref 40–120)
ALT FLD-CCNC: 12 U/L — SIGNIFICANT CHANGE UP (ref 4–41)
ANION GAP SERPL CALC-SCNC: 12 MMO/L — SIGNIFICANT CHANGE UP (ref 7–14)
APPEARANCE UR: CLEAR — SIGNIFICANT CHANGE UP
AST SERPL-CCNC: 11 U/L — SIGNIFICANT CHANGE UP (ref 4–40)
BILIRUB SERPL-MCNC: 0.4 MG/DL — SIGNIFICANT CHANGE UP (ref 0.2–1.2)
BILIRUB UR-MCNC: NEGATIVE — SIGNIFICANT CHANGE UP
BLOOD UR QL VISUAL: NEGATIVE — SIGNIFICANT CHANGE UP
BUN SERPL-MCNC: 15 MG/DL — SIGNIFICANT CHANGE UP (ref 7–23)
CALCIUM SERPL-MCNC: 9.2 MG/DL — SIGNIFICANT CHANGE UP (ref 8.4–10.5)
CHLORIDE SERPL-SCNC: 101 MMOL/L — SIGNIFICANT CHANGE UP (ref 98–107)
CO2 SERPL-SCNC: 22 MMOL/L — SIGNIFICANT CHANGE UP (ref 22–31)
COLOR SPEC: COLORLESS — SIGNIFICANT CHANGE UP
CREAT SERPL-MCNC: 1.01 MG/DL — SIGNIFICANT CHANGE UP (ref 0.5–1.3)
GLUCOSE SERPL-MCNC: 200 MG/DL — HIGH (ref 70–99)
GLUCOSE UR-MCNC: NEGATIVE — SIGNIFICANT CHANGE UP
HCT VFR BLD CALC: 31.6 % — LOW (ref 39–50)
HGB BLD-MCNC: 10.5 G/DL — LOW (ref 13–17)
KETONES UR-MCNC: NEGATIVE — SIGNIFICANT CHANGE UP
LEUKOCYTE ESTERASE UR-ACNC: NEGATIVE — SIGNIFICANT CHANGE UP
MCHC RBC-ENTMCNC: 28.4 PG — SIGNIFICANT CHANGE UP (ref 27–34)
MCHC RBC-ENTMCNC: 33.2 % — SIGNIFICANT CHANGE UP (ref 32–36)
MCV RBC AUTO: 85.4 FL — SIGNIFICANT CHANGE UP (ref 80–100)
NITRITE UR-MCNC: NEGATIVE — SIGNIFICANT CHANGE UP
NRBC # FLD: 0 K/UL — SIGNIFICANT CHANGE UP (ref 0–0)
PH UR: 7.5 — SIGNIFICANT CHANGE UP (ref 5–8)
PLATELET # BLD AUTO: 275 K/UL — SIGNIFICANT CHANGE UP (ref 150–400)
PMV BLD: 10.2 FL — SIGNIFICANT CHANGE UP (ref 7–13)
POTASSIUM SERPL-MCNC: 4.3 MMOL/L — SIGNIFICANT CHANGE UP (ref 3.5–5.3)
POTASSIUM SERPL-SCNC: 4.3 MMOL/L — SIGNIFICANT CHANGE UP (ref 3.5–5.3)
PROT SERPL-MCNC: 6.3 G/DL — SIGNIFICANT CHANGE UP (ref 6–8.3)
PROT UR-MCNC: NEGATIVE — SIGNIFICANT CHANGE UP
RBC # BLD: 3.7 M/UL — LOW (ref 4.2–5.8)
RBC # FLD: 13.1 % — SIGNIFICANT CHANGE UP (ref 10.3–14.5)
SODIUM SERPL-SCNC: 135 MMOL/L — SIGNIFICANT CHANGE UP (ref 135–145)
SP GR SPEC: 1.01 — SIGNIFICANT CHANGE UP (ref 1–1.04)
UROBILINOGEN FLD QL: NORMAL — SIGNIFICANT CHANGE UP
WBC # BLD: 8.18 K/UL — SIGNIFICANT CHANGE UP (ref 3.8–10.5)
WBC # FLD AUTO: 8.18 K/UL — SIGNIFICANT CHANGE UP (ref 3.8–10.5)

## 2020-01-15 PROCEDURE — 70486 CT MAXILLOFACIAL W/O DYE: CPT | Mod: 26

## 2020-01-15 PROCEDURE — 71045 X-RAY EXAM CHEST 1 VIEW: CPT | Mod: 26

## 2020-01-15 PROCEDURE — 70450 CT HEAD/BRAIN W/O DYE: CPT | Mod: 26

## 2020-01-15 PROCEDURE — 72125 CT NECK SPINE W/O DYE: CPT | Mod: 26

## 2020-01-15 RX ORDER — SODIUM CHLORIDE 9 MG/ML
1000 INJECTION INTRAMUSCULAR; INTRAVENOUS; SUBCUTANEOUS ONCE
Refills: 0 | Status: COMPLETED | OUTPATIENT
Start: 2020-01-15 | End: 2020-01-15

## 2020-01-15 RX ADMIN — SODIUM CHLORIDE 1000 MILLILITER(S): 9 INJECTION INTRAMUSCULAR; INTRAVENOUS; SUBCUTANEOUS at 14:01

## 2020-01-15 RX ADMIN — SODIUM CHLORIDE 1000 MILLILITER(S): 9 INJECTION INTRAMUSCULAR; INTRAVENOUS; SUBCUTANEOUS at 13:01
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
## 2020-01-15 NOTE — ED ADULT NURSE REASSESSMENT NOTE - NS ED NURSE REASSESS COMMENT FT1
Pt a&ox2.  Pt resting in stretcher and appears comfortable denies pain or discomfort.  Social work consulted pending disposition at this time.  Pt placed in C-collar by MD. Pt awaiting MRI at this time.  Vital signs as noted. Will continue to monitor

## 2020-01-15 NOTE — ED PROVIDER NOTE - ENMT, MLM
Airway patent; swelling and bruising around L eye.  Eye itself appears uninjured.  No apparent facial bone tenderness

## 2020-01-15 NOTE — ED PROVIDER NOTE - CLINICAL SUMMARY MEDICAL DECISION MAKING FREE TEXT BOX
Adrienne:  Will eval for intra-cranial injury and fx.  Social work contacted to help further understand circumstances of fall. Adrienne:  Will eval for intra-cranial injury and fx.  Social work contacted to help further understand circumstances of injury.  I called emergency contact numbers for pt in SNF paperwork.  One number rang continually without voicemail option.  I left voicemail on next number.
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
## 2020-01-15 NOTE — PROVIDER CONTACT NOTE (OTHER) - ASSESSMENT
MD requested social work assistance to provide a consultation for the patient and his brother Noe Jane [phone].  met with the patient and family member at the bedside. Patient was alert and orientedx2. Patients brother reported concerns with the patients facility.  provided brother with the information for the Department of [facility] complaint form and [facility] contact information. Patient is currently awaiting MRI. Patient will require ambulance transportation back to the facility at [facility] upon discharge. Patient is bedbound and insured by Medicaid. Patients brother is interested in traveling with the patient back to the home. Social work to follow up.
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
## 2020-01-15 NOTE — ED ADULT NURSE REASSESSMENT NOTE - NS ED NURSE REASSESS COMMENT FT1
PT A&Ox2, C/O to pain to L eye at this time. Pt straight catheterized under sterile procedure for urine as per orders. ALDAIR Faulkner at bedside fro procedure. Approximately 300 ml clear yellow urine output upon insertion, Pt tolerated well. PT awaiting MRI at this time. Comfort measures provided, will continue to monitor for safety and comfort.
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
## 2020-01-15 NOTE — ED ADULT NURSE NOTE - ED STAT RN HANDOFF DETAILS
Report given to ALDAIR Watts.  Pt a&ox2 family and patient aware of plan of care and awaiting transport at this time.  Vital signs as noted, will continue to monitor

## 2020-01-16 DIAGNOSIS — E78.49 OTHER HYPERLIPIDEMIA: ICD-10-CM

## 2020-01-16 DIAGNOSIS — I25.10 ATHEROSCLEROTIC HEART DISEASE OF NATIVE CORONARY ARTERY WITHOUT ANGINA PECTORIS: ICD-10-CM

## 2020-01-16 DIAGNOSIS — Z29.9 ENCOUNTER FOR PROPHYLACTIC MEASURES, UNSPECIFIED: ICD-10-CM

## 2020-01-16 DIAGNOSIS — Z02.9 ENCOUNTER FOR ADMINISTRATIVE EXAMINATIONS, UNSPECIFIED: ICD-10-CM

## 2020-01-16 DIAGNOSIS — E11.9 TYPE 2 DIABETES MELLITUS WITHOUT COMPLICATIONS: ICD-10-CM

## 2020-01-16 DIAGNOSIS — I10 ESSENTIAL (PRIMARY) HYPERTENSION: ICD-10-CM
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
## 2020-01-16 DIAGNOSIS — I63.9 CEREBRAL INFARCTION, UNSPECIFIED: ICD-10-CM

## 2020-01-16 DIAGNOSIS — F03.90 UNSPECIFIED DEMENTIA WITHOUT BEHAVIORAL DISTURBANCE: ICD-10-CM

## 2020-01-16 LAB
ANION GAP SERPL CALC-SCNC: 10 MMO/L — SIGNIFICANT CHANGE UP (ref 7–14)
BUN SERPL-MCNC: 10 MG/DL — SIGNIFICANT CHANGE UP (ref 7–23)
CALCIUM SERPL-MCNC: 9.1 MG/DL — SIGNIFICANT CHANGE UP (ref 8.4–10.5)
CHLORIDE SERPL-SCNC: 102 MMOL/L — SIGNIFICANT CHANGE UP (ref 98–107)
CHOLEST SERPL-MCNC: 127 MG/DL — SIGNIFICANT CHANGE UP (ref 120–199)
CO2 SERPL-SCNC: 22 MMOL/L — SIGNIFICANT CHANGE UP (ref 22–31)
CREAT SERPL-MCNC: 0.74 MG/DL — SIGNIFICANT CHANGE UP (ref 0.5–1.3)
GLUCOSE BLDC GLUCOMTR-MCNC: 126 MG/DL — HIGH (ref 70–99)
GLUCOSE BLDC GLUCOMTR-MCNC: 140 MG/DL — HIGH (ref 70–99)
GLUCOSE SERPL-MCNC: 105 MG/DL — HIGH (ref 70–99)
HBA1C BLD-MCNC: 7.3 % — HIGH (ref 4–5.6)
HCT VFR BLD CALC: 32.6 % — LOW (ref 39–50)
HDLC SERPL-MCNC: 32 MG/DL — LOW (ref 35–55)
HGB BLD-MCNC: 10.7 G/DL — LOW (ref 13–17)
LIPID PNL WITH DIRECT LDL SERPL: 69 MG/DL — SIGNIFICANT CHANGE UP
MAGNESIUM SERPL-MCNC: 1.7 MG/DL — SIGNIFICANT CHANGE UP (ref 1.6–2.6)
MCHC RBC-ENTMCNC: 27.9 PG — SIGNIFICANT CHANGE UP (ref 27–34)
MCHC RBC-ENTMCNC: 32.8 % — SIGNIFICANT CHANGE UP (ref 32–36)
MCV RBC AUTO: 84.9 FL — SIGNIFICANT CHANGE UP (ref 80–100)
NRBC # FLD: 0 K/UL — SIGNIFICANT CHANGE UP (ref 0–0)
PHOSPHATE SERPL-MCNC: 3 MG/DL — SIGNIFICANT CHANGE UP (ref 2.5–4.5)
PLATELET # BLD AUTO: 298 K/UL — SIGNIFICANT CHANGE UP (ref 150–400)
PMV BLD: 10.2 FL — SIGNIFICANT CHANGE UP (ref 7–13)
POTASSIUM SERPL-MCNC: 3.8 MMOL/L — SIGNIFICANT CHANGE UP (ref 3.5–5.3)
POTASSIUM SERPL-SCNC: 3.8 MMOL/L — SIGNIFICANT CHANGE UP (ref 3.5–5.3)
RBC # BLD: 3.84 M/UL — LOW (ref 4.2–5.8)
RBC # FLD: 12.9 % — SIGNIFICANT CHANGE UP (ref 10.3–14.5)
SODIUM SERPL-SCNC: 134 MMOL/L — LOW (ref 135–145)
TRIGL SERPL-MCNC: 168 MG/DL — HIGH (ref 10–149)
TSH SERPL-MCNC: 1.12 UIU/ML — SIGNIFICANT CHANGE UP (ref 0.27–4.2)
WBC # BLD: 8.5 K/UL — SIGNIFICANT CHANGE UP (ref 3.8–10.5)
WBC # FLD AUTO: 8.5 K/UL — SIGNIFICANT CHANGE UP (ref 3.8–10.5)

## 2020-01-16 PROCEDURE — 99233 SBSQ HOSP IP/OBS HIGH 50: CPT

## 2020-01-16 PROCEDURE — 99223 1ST HOSP IP/OBS HIGH 75: CPT

## 2020-01-16 PROCEDURE — 72141 MRI NECK SPINE W/O DYE: CPT | Mod: 26

## 2020-01-16 PROCEDURE — 12345: CPT | Mod: NC

## 2020-01-16 RX ORDER — DEXTROSE 50 % IN WATER 50 %
15 SYRINGE (ML) INTRAVENOUS ONCE
Refills: 0 | Status: COMPLETED | OUTPATIENT
Start: 2020-01-16 | End: 2020-01-16

## 2020-01-16 RX ORDER — DEXTROSE 50 % IN WATER 50 %
25 SYRINGE (ML) INTRAVENOUS ONCE
Refills: 0 | Status: DISCONTINUED | OUTPATIENT
Start: 2020-01-16 | End: 2020-01-24

## 2020-01-16 RX ORDER — INFLUENZA VIRUS VACCINE 15; 15; 15; 15 UG/.5ML; UG/.5ML; UG/.5ML; UG/.5ML
0.5 SUSPENSION INTRAMUSCULAR ONCE
Refills: 0 | Status: DISCONTINUED | OUTPATIENT
Start: 2020-01-16 | End: 2020-01-24

## 2020-01-16 RX ORDER — GLUCAGON INJECTION, SOLUTION 0.5 MG/.1ML
1 INJECTION, SOLUTION SUBCUTANEOUS ONCE
Refills: 0 | Status: DISCONTINUED | OUTPATIENT
Start: 2020-01-16 | End: 2020-01-24

## 2020-01-16 RX ORDER — METOPROLOL TARTRATE 50 MG
25 TABLET ORAL
Refills: 0 | Status: DISCONTINUED | OUTPATIENT
Start: 2020-01-16 | End: 2020-01-24

## 2020-01-16 RX ORDER — ATORVASTATIN CALCIUM 80 MG/1
10 TABLET, FILM COATED ORAL AT BEDTIME
Refills: 0 | Status: DISCONTINUED | OUTPATIENT
Start: 2020-01-16 | End: 2020-01-24

## 2020-01-16 RX ORDER — ACETAMINOPHEN 500 MG
650 TABLET ORAL EVERY 6 HOURS
Refills: 0 | Status: DISCONTINUED | OUTPATIENT
Start: 2020-01-16 | End: 2020-01-21

## 2020-01-16 RX ORDER — SODIUM CHLORIDE 9 MG/ML
1000 INJECTION, SOLUTION INTRAVENOUS
Refills: 0 | Status: DISCONTINUED | OUTPATIENT
Start: 2020-01-16 | End: 2020-01-24

## 2020-01-16 RX ORDER — CLOPIDOGREL BISULFATE 75 MG/1
75 TABLET, FILM COATED ORAL DAILY
Refills: 0 | Status: DISCONTINUED | OUTPATIENT
Start: 2020-01-16 | End: 2020-01-16

## 2020-01-16 RX ORDER — GABAPENTIN 400 MG/1
1 CAPSULE ORAL
Qty: 0 | Refills: 0 | DISCHARGE

## 2020-01-16 RX ORDER — SODIUM CHLORIDE 9 MG/ML
3 INJECTION INTRAMUSCULAR; INTRAVENOUS; SUBCUTANEOUS EVERY 8 HOURS
Refills: 0 | Status: DISCONTINUED | OUTPATIENT
Start: 2020-01-16 | End: 2020-01-24

## 2020-01-16 RX ORDER — PANTOPRAZOLE SODIUM 20 MG/1
1 TABLET, DELAYED RELEASE ORAL
Qty: 0 | Refills: 0 | DISCHARGE

## 2020-01-16 RX ORDER — LISINOPRIL 2.5 MG/1
40 TABLET ORAL DAILY
Refills: 0 | Status: DISCONTINUED | OUTPATIENT
Start: 2020-01-16 | End: 2020-01-20

## 2020-01-16 RX ORDER — METOPROLOL TARTRATE 50 MG
25 TABLET ORAL ONCE
Refills: 0 | Status: COMPLETED | OUTPATIENT
Start: 2020-01-16 | End: 2020-01-16

## 2020-01-16 RX ORDER — INSULIN LISPRO 100/ML
VIAL (ML) SUBCUTANEOUS AT BEDTIME
Refills: 0 | Status: DISCONTINUED | OUTPATIENT
Start: 2020-01-16 | End: 2020-01-24

## 2020-01-16 RX ORDER — INSULIN LISPRO 100/ML
VIAL (ML) SUBCUTANEOUS
Refills: 0 | Status: DISCONTINUED | OUTPATIENT
Start: 2020-01-16 | End: 2020-01-24

## 2020-01-16 RX ORDER — INSULIN GLARGINE 100 [IU]/ML
5 INJECTION, SOLUTION SUBCUTANEOUS AT BEDTIME
Refills: 0 | Status: DISCONTINUED | OUTPATIENT
Start: 2020-01-16 | End: 2020-01-17

## 2020-01-16 RX ORDER — DEXTROSE 50 % IN WATER 50 %
12.5 SYRINGE (ML) INTRAVENOUS ONCE
Refills: 0 | Status: DISCONTINUED | OUTPATIENT
Start: 2020-01-16 | End: 2020-01-24

## 2020-01-16 RX ORDER — ASPIRIN/CALCIUM CARB/MAGNESIUM 324 MG
81 TABLET ORAL DAILY
Refills: 0 | Status: DISCONTINUED | OUTPATIENT
Start: 2020-01-16 | End: 2020-01-24

## 2020-01-16 RX ORDER — NIFEDIPINE 30 MG
90 TABLET, EXTENDED RELEASE 24 HR ORAL DAILY
Refills: 0 | Status: DISCONTINUED | OUTPATIENT
Start: 2020-01-16 | End: 2020-01-20

## 2020-01-16 RX ORDER — DEXTROSE 50 % IN WATER 50 %
15 SYRINGE (ML) INTRAVENOUS ONCE
Refills: 0 | Status: DISCONTINUED | OUTPATIENT
Start: 2020-01-16 | End: 2020-01-24

## 2020-01-16 RX ORDER — SENNA PLUS 8.6 MG/1
2 TABLET ORAL AT BEDTIME
Refills: 0 | Status: DISCONTINUED | OUTPATIENT
Start: 2020-01-16 | End: 2020-01-24

## 2020-01-16 RX ORDER — TAMSULOSIN HYDROCHLORIDE 0.4 MG/1
0.4 CAPSULE ORAL AT BEDTIME
Refills: 0 | Status: DISCONTINUED | OUTPATIENT
Start: 2020-01-16 | End: 2020-01-24

## 2020-01-16 RX ADMIN — CLOPIDOGREL BISULFATE 75 MILLIGRAM(S): 75 TABLET, FILM COATED ORAL at 11:53

## 2020-01-16 RX ADMIN — ATORVASTATIN CALCIUM 10 MILLIGRAM(S): 80 TABLET, FILM COATED ORAL at 21:11

## 2020-01-16 RX ADMIN — TAMSULOSIN HYDROCHLORIDE 0.4 MILLIGRAM(S): 0.4 CAPSULE ORAL at 21:11

## 2020-01-16 RX ADMIN — Medication 2: at 12:51

## 2020-01-16 RX ADMIN — SODIUM CHLORIDE 3 MILLILITER(S): 9 INJECTION INTRAMUSCULAR; INTRAVENOUS; SUBCUTANEOUS at 20:35

## 2020-01-16 RX ADMIN — Medication 25 MILLIGRAM(S): at 17:39

## 2020-01-16 RX ADMIN — INSULIN GLARGINE 5 UNIT(S): 100 INJECTION, SOLUTION SUBCUTANEOUS at 22:02

## 2020-01-16 RX ADMIN — Medication 25 MILLIGRAM(S): at 01:39

## 2020-01-16 RX ADMIN — Medication 4: at 17:40

## 2020-01-16 RX ADMIN — LISINOPRIL 40 MILLIGRAM(S): 2.5 TABLET ORAL at 06:02

## 2020-01-16 RX ADMIN — SODIUM CHLORIDE 3 MILLILITER(S): 9 INJECTION INTRAMUSCULAR; INTRAVENOUS; SUBCUTANEOUS at 06:04

## 2020-01-16 RX ADMIN — INSULIN GLARGINE 5 UNIT(S): 100 INJECTION, SOLUTION SUBCUTANEOUS at 01:40

## 2020-01-16 RX ADMIN — SODIUM CHLORIDE 3 MILLILITER(S): 9 INJECTION INTRAMUSCULAR; INTRAVENOUS; SUBCUTANEOUS at 13:54

## 2020-01-16 RX ADMIN — Medication 15 GRAM(S): at 21:21

## 2020-01-16 RX ADMIN — Medication 81 MILLIGRAM(S): at 11:53

## 2020-01-16 NOTE — H&P ADULT - NSICDXPASTMEDICALHX_GEN_ALL_CORE_FT
PAST MEDICAL HISTORY:  CAD (coronary artery disease)     CVA (cerebral vascular accident)     Dementia     Diabetes     HLD (hyperlipidemia)     HTN (hypertension)
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
## 2020-01-16 NOTE — H&P ADULT - PROBLEM SELECTOR PLAN 1
F/U MRI C spine to see if C collar is clear to remove  TTE  Check orthostatics & neuro checks.  PT  SW following.

## 2020-01-16 NOTE — PROGRESS NOTE ADULT - SUBJECTIVE AND OBJECTIVE BOX
Dr. Mira Bishop  Pager [phone]    PROGRESS NOTE:     Patient is a 75y old  Male who presents with a chief complaint of Fall x 2 days (2020 00:14)      SUBJECTIVE / OVERNIGHT EVENTS: pt reports he fell 2 days ago   ADDITIONAL REVIEW OF SYSTEMS: no chest pain/sob    MEDICATIONS  (STANDING):  aspirin enteric coated 81 milliGRAM(s) Oral daily  atorvastatin 10 milliGRAM(s) Oral at bedtime  clopidogrel Tablet 75 milliGRAM(s) Oral daily  dextrose 5%. 1000 milliLiter(s) (50 mL/Hr) IV Continuous <Continuous>  dextrose 50% Injectable 12.5 Gram(s) IV Push once  dextrose 50% Injectable 25 Gram(s) IV Push once  dextrose 50% Injectable 25 Gram(s) IV Push once  influenza   Vaccine 0.5 milliLiter(s) IntraMuscular once  insulin glargine Injectable (LANTUS) 5 Unit(s) SubCutaneous at bedtime  insulin lispro (HumaLOG) corrective regimen sliding scale   SubCutaneous three times a day before meals  insulin lispro (HumaLOG) corrective regimen sliding scale   SubCutaneous at bedtime  lisinopril 40 milliGRAM(s) Oral daily  metoprolol tartrate 25 milliGRAM(s) Oral two times a day  NIFEdipine XL 90 milliGRAM(s) Oral daily  senna 2 Tablet(s) Oral at bedtime  sodium chloride 0.9% lock flush 3 milliLiter(s) IV Push every 8 hours  tamsulosin 0.4 milliGRAM(s) Oral at bedtime    MEDICATIONS  (PRN):  acetaminophen   Tablet .. 650 milliGRAM(s) Oral every 6 hours PRN Temp greater or equal to 38C (100.4F), Mild Pain (1 - 3), Moderate Pain (4 - 6)  dextrose 40% Gel 15 Gram(s) Oral once PRN Blood Glucose LESS THAN 70 milliGRAM(s)/deciliter  glucagon  Injectable 1 milliGRAM(s) IntraMuscular once PRN Glucose LESS THAN 70 milligrams/deciliter      CAPILLARY BLOOD GLUCOSE      POCT Blood Glucose.: 140 mg/dL (2020 09:41)  POCT Blood Glucose.: 126 mg/dL (2020 01:38)  POCT Blood Glucose.: 133 mg/dL (15 Kg 2020 23:15)    I&O's Summary    15 Kg 2020 07:01  -  2020 07:00  --------------------------------------------------------  IN: 0 mL / OUT: 300 mL / NET: -300 mL        PHYSICAL EXAM:  Vital Signs Last 24 Hrs  T(C): 36.7 (2020 08:54), Max: 36.8 (15 Kg 2020 12:40)  T(F): 98 (2020 08:54), Max: 98.3 (15 Kg 2020 12:40)  HR: 66 (2020 08:54) (60 - 72)  BP: 155/86 (2020 08:54) (126/66 - 185/90)  BP(mean): --  RR: 18 (2020 08:54) (15 - 18)  SpO2: 98% (2020 08:54) (96% - 100%)    CONSTITUTIONAL: NAD, well-developed  Face: orbital ecchymosis/soft tissue swelling, hard of hearing  Heent: cervical collar on  RESPIRATORY: Normal respiratory effort; lungs are clear to auscultation bilaterally  CARDIOVASCULAR: Regular rate and rhythm, normal S1 and S2, no murmur/rub/gallop; No lower extremity edema; Peripheral pulses are 2+ bilaterally  ABDOMEN: Nontender to palpation, normoactive bowel sounds, no rebound/guarding; No hepatosplenomegaly  MUSCULOSKELETAL: no clubbing or cyanosis of digits; no joint swelling or tenderness to palpation  PSYCH: A+O to person, place, and time; affect appropriate    LABS:                        10.7   8.50  )-----------( 298      ( 2020 06:00 )             32.6     01-16    134<L>  |  102  |  10  ----------------------------<  105<H>  3.8   |  22  |  0.74    Ca    9.1      2020 06:00  Phos  3.0     01-16  Mg     1.7     -16    TPro  6.3  /  Alb  3.6  /  TBili  0.4  /  DBili  x   /  AST  11  /  ALT  12  /  AlkPhos  97  -15          Urinalysis Basic - ( 15 Kg 2020 17:00 )    Color: COLORLESS / Appearance: CLEAR / S.011 / pH: 7.5  Gluc: NEGATIVE / Ketone: NEGATIVE  / Bili: NEGATIVE / Urobili: NORMAL   Blood: NEGATIVE / Protein: NEGATIVE / Nitrite: NEGATIVE   Leuk Esterase: NEGATIVE / RBC: x / WBC x   Sq Epi: x / Non Sq Epi: x / Bacteria: x      RADIOLOGY & ADDITIONAL TESTS:  Results Reviewed:   Imaging Personally Reviewed:  < from: MR Cervical Spine No Cont (20 @ 09:57) >  IMPRESSION:    No evidencefor ligamentous injury.    Nonspecific bone marrow edema involves the T1 spinous process. No displaced fracture is noted in this location on the prior cervical spine CT. This may reflect degenerative changes, a small underlying nonspecific bony lesion, or a nondisplaced fracture. Serial imaging follow-up over time is suggested to monitor for stability.    Chronic myelomalacia of the cervical spinal cord. Postoperative changes as described.    < from: CT Head No Cont (01.15.20 @ 14:05) >  CT BRAIN/ MAXILLOFACIAL:  TECHNIQUE:  Serial axial images were obtained from the skull base to the vertex and from the top of the frontal sinuses through the bottom of the mandible without intravenous contrast. Sagittal and coronal reformatted images were obtained.    COMPARISON EXAMINATION: 2019    FINDINGS:    VENTRICLES AND SULCI:  Prominent in size compatible with age-appropriate volume loss    INTRA-AXIAL:  Patchy areas of white matter hypodensity are compatible with microvascular-type changes. Bilateral basal ganglia and right thalamic lacunae are again seen. A left internal capsule lacune and pontine lacune are stable. No mass effect or hemorrhage.  EXTRA-AXIAL:  No mass or collection is seen.  CALVARIUM:  Normal.  FACIAL BONES:  No fracture. Left periorbital soft tissue swelling is seen.  MANDIBLE:  No fracture. Multiple teeth are absent. Multiple maxillary teeth are absent.  REMAINING VISUALIZED BONES: Cervical spondylosis. Status post cervical spine posterior fusion incompletely seen involving C3 and C4 vertebral bodies  SINONASAL CAVITIES:  Left sphenoid polyp or retention cyst. Mild ethmoid mucosal thickening.  ORBITAL CONTENTS:  Normal.  MISCELLANEOUS:  None.    IMPRESSION:  Stable brain CT without evidence of intracranial mass effect or hemorrhage.    No maxillofacial fracture.    CT CERVICAL SPINE:  TECHNIQUE:  Axial images were obtained using multislice helical technique.  Reformatted coronal and sagittal images were performed.      COMPARISON EXAMINATION:  None.    FINDINGS:    VERTEBRAL BODIES:  Normal in height. No fracture. Multilevel marginal osteophyte formation. Status post C3-C6 laminectomy with a metallic posterior fixation device in place. The hardware is in good position without evidence of metal fatigue or loosening.  ALIGNMENT:  There is separation between the C6 and C7 spinous processes. There is mild reversal of the cervical lordosis.  INTERVERTEBRAL DISC SPACES: C3-C4 disc bulge/ridge with bilateral foraminal stenosis.  C4-C5 diffuse disc bulge/ridge with bilateral foraminal stenosis.  C5-C6 diffuse disc bulge/ridge with bilateral foraminal stenosis.  C6-C7 disc bulge/ridge with asymmetry toward the left with left greater than right foraminal stenosis.  MISCELLANEOUS:  None.    IMPRESSION:  No fracture.    Widening of the C6-C7 interspinous space with reversal of the cervical lordosis raising the possibility of a ligamentous injury in this area. Cervical spine MR advised as clinically warranted for further evaluation.    Multilevel spondylosis with foraminal narrowing.    Postoperative changes.    < from: Xray Chest 1 View- PORTABLE-Urgent (01.15.20 @ 12:51) >  IMPRESSION:  Clear lungs.    Electrocardiogram Personally Reviewed:    COORDINATION OF CARE:  Care Discussed with Consultants/Other Providers [Y/N]: ronda Steven, ortho spine consult  Prior or Outpatient Records Reviewed [Y/N]:

## 2020-01-16 NOTE — H&P ADULT - NSHPPHYSICALEXAM_GEN_ALL_CORE
T(C): 36.8 (01-16-20 @ 06:01), Max: 36.8 (01-15-20 @ 12:40)  HR: 60 (01-16-20 @ 06:01) (60 - 72)  BP: 150/75 (01-16-20 @ 06:01) (96/55 - 185/90)  RR: 18 (01-16-20 @ 06:01) (15 - 20)  SpO2: 97% (01-16-20 @ 06:01) (96% - 100%)

## 2020-01-16 NOTE — PROGRESS NOTE ADULT - ASSESSMENT
75M admitted for unwitnessed fall with ecchymosis to b/l orbits L > R..in C Collar due to widening of the C6-C7 interspinous space with reversal of the cervical lordosis raising the possibility of a ligamentous injury in this area

## 2020-01-16 NOTE — H&P ADULT - ASSESSMENT
75M admitted for unwitnessed fall with ecchymosis to b/l orbits L > R..in C Collar due to widening of the C6-C7 interspinous space with reversal of the cervical lordosis raising the possibility of a ligamentous injury in this area pending MRI C spine
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
## 2020-01-16 NOTE — PROVIDER CONTACT NOTE (OTHER) - SITUATION
Pt's FS at 21:12 was 58, repeat at 21:14 was 57. Patient stated that he had poor intake for dinner. He was given 4 units of humalog before dinner

## 2020-01-16 NOTE — H&P ADULT - RS GEN PE MLT RESP DETAILS PC
no rhonchi/airway patent/respirations non-labored/breath sounds equal/no chest wall tenderness/no intercostal retractions/clear to auscultation bilaterally/no subcutaneous emphysema/no wheezes/no rales/good air movement

## 2020-01-16 NOTE — H&P ADULT - NSHPLABSRESULTS_GEN_ALL_CORE
CT BRAIN: Stable brain CT without evidence of intracranial mass effect or hemorrhage.     CT MAXILLOFACIAL: No maxillofacial fracture.    CT CERVICAL SPINE: Widening of the C6-C7 interspinous space with reversal of the cervical lordosis raising the possibility of a ligamentous injury in this area. Cervical spine MR advised as clinically warranted for further evaluation. No fractures    CXR Clear     NSR @ 68b/ min, Qt/ QTC= 416/ 442.                           10.5   8.18  )-----------( 275      ( 15 Kg 2020 12:34 )             31.6   01-15    135  |  101  |  15  ----------------------------<  200<H>  4.3   |  22  |  1.01    Ca    9.2      15 Kg 2020 12:34    TPro  6.3  /  Alb  3.6  /  TBili  0.4  /  DBili  x   /  AST  11  /  ALT  12  /  AlkPhos  97  01-15 CT BRAIN: Stable brain CT without evidence of intracranial mass effect or hemorrhage.     CT MAXILLOFACIAL: No maxillofacial fracture.    CT CERVICAL SPINE: Widening of the C6-C7 interspinous space with reversal of the cervical lordosis raising the possibility of a ligamentous injury in this area. Cervical spine MR advised as clinically warranted for further evaluation. No fractures    CXR Clear     EKG personally reviewed, NSR @ 68b/ min, Qt/ QTC= 416/ 442.                           10.5   8.18  )-----------( 275      ( 15 Kg 2020 12:34 )             31.6   01-15    135  |  101  |  15  ----------------------------<  200<H>  4.3   |  22  |  1.01    Ca    9.2      15 Kg 2020 12:34    TPro  6.3  /  Alb  3.6  /  TBili  0.4  /  DBili  x   /  AST  11  /  ALT  12  /  AlkPhos  97  01-15

## 2020-01-16 NOTE — PATIENT PROFILE ADULT - ANY SIGNIFICANT CHANGE IN ABILITY TO PERFORM THE FOLLOWING ADL SINCE THE ONSET OF PRESENT ILLNESS?
Letter mailed to patient reminding her she is due for labs.     Lab Frequency Next Occurrence   TSH W REFLEX TO FREE T4 Once 12/03/2018   HEMOGLOBIN A1C Once 04/05/2019
dressing/self feeding/personal hygiene

## 2020-01-16 NOTE — H&P ADULT - PROBLEM SELECTOR PLAN 3
Continue BB, Statin.  ASA & Plavix on hold for now. Continue BB, Statin, ASA, Plavix F/U A1C  FS QID  HISS  Continue Lantus.  DM diet.

## 2020-01-16 NOTE — CONSULT NOTE ADULT - ASSESSMENT
75 year old dementia, bed bound, nursing home patient p/w fall from bed, neurosurgery called for C6-7 widening concerning for ligamentous inury, MRI negative for ligamentous injury and shows chronic myelomalacia which contributing for tricep weakness and generalized weakness        1. MRI cervical spine finding chronic in nature 75 year old dementia, bed bound, nursing home patient p/w fall from bed, neurosurgery called for C6-7 widening concerning for ligamentous inury, MRI negative for ligamentous injury and shows chronic myelomalacia which contributing for tricep weakness and generalized weakness s/p appears to be C3-6 laminoplasty         1. MRI cervical spine finding chronic in nature, patient has had C3-6 laminoplasty  2. Cervical collar removed  3. Reconsult PRN  4. D/w Dr. Joseph

## 2020-01-16 NOTE — PROGRESS NOTE ADULT - PROBLEM SELECTOR PLAN 1
-pt reports fall 2 days ago, CT head no acute path, CT maxillofacial no fracture, +soft tissue swelling left periorbital, CT c spine multiple degenerative changes, widening of the C6-C7 interspinous space with reversal of the cervical lordosis raising the possibility of a ligamentous injury  MRI cervical spine (1/16)   No evidencefor ligamentous injury. Nonspecific bone marrow edema involves the T1 spinous process.  -ortho spine to see patient  -can d/c cervical collar as MRI negative for ligamentous injury   -called echo lab to expedite echo  -monitor orthostatics & neuro checks.  PT  SW following.

## 2020-01-16 NOTE — PROVIDER CONTACT NOTE (OTHER) - ASSESSMENT
New admit. Pt's BP is manual 185/90 other vitals stable. Pt is asymptomatic.  Pt can also not stand for last set of orthos @ this time. Orthos are very high BP's as well. Please refer to flow sheet.

## 2020-01-16 NOTE — H&P ADULT - NEGATIVE NEUROLOGICAL SYMPTOMS
no vertigo/no syncope/no loss of consciousness/no tremors/no loss of sensation/no headache/no hemiparesis/no confusion/no facial palsy

## 2020-01-16 NOTE — H&P ADULT - GASTROINTESTINAL DETAILS
no guarding/no bruit/nontender/no rebound tenderness/soft/no distention/no rigidity/no masses palpable/bowel sounds normal

## 2020-01-17 ENCOUNTER — TRANSCRIPTION ENCOUNTER (OUTPATIENT)
Age: 75
End: 2020-01-17

## 2020-01-17 LAB
ANION GAP SERPL CALC-SCNC: 10 MMO/L — SIGNIFICANT CHANGE UP (ref 7–14)
BACTERIA UR CULT: SIGNIFICANT CHANGE UP
BUN SERPL-MCNC: 13 MG/DL — SIGNIFICANT CHANGE UP (ref 7–23)
CALCIUM SERPL-MCNC: 9 MG/DL — SIGNIFICANT CHANGE UP (ref 8.4–10.5)
CHLORIDE SERPL-SCNC: 102 MMOL/L — SIGNIFICANT CHANGE UP (ref 98–107)
CO2 SERPL-SCNC: 22 MMOL/L — SIGNIFICANT CHANGE UP (ref 22–31)
CREAT SERPL-MCNC: 0.87 MG/DL — SIGNIFICANT CHANGE UP (ref 0.5–1.3)
GLUCOSE SERPL-MCNC: 151 MG/DL — HIGH (ref 70–99)
HCT VFR BLD CALC: 32.4 % — LOW (ref 39–50)
HGB BLD-MCNC: 10.7 G/DL — LOW (ref 13–17)
MAGNESIUM SERPL-MCNC: 1.7 MG/DL — SIGNIFICANT CHANGE UP (ref 1.6–2.6)
MCHC RBC-ENTMCNC: 28 PG — SIGNIFICANT CHANGE UP (ref 27–34)
MCHC RBC-ENTMCNC: 33 % — SIGNIFICANT CHANGE UP (ref 32–36)
MCV RBC AUTO: 84.8 FL — SIGNIFICANT CHANGE UP (ref 80–100)
NRBC # FLD: 0 K/UL — SIGNIFICANT CHANGE UP (ref 0–0)
PHOSPHATE SERPL-MCNC: 3.3 MG/DL — SIGNIFICANT CHANGE UP (ref 2.5–4.5)
PLATELET # BLD AUTO: 298 K/UL — SIGNIFICANT CHANGE UP (ref 150–400)
PMV BLD: 10.3 FL — SIGNIFICANT CHANGE UP (ref 7–13)
POTASSIUM SERPL-MCNC: 3.7 MMOL/L — SIGNIFICANT CHANGE UP (ref 3.5–5.3)
POTASSIUM SERPL-SCNC: 3.7 MMOL/L — SIGNIFICANT CHANGE UP (ref 3.5–5.3)
RBC # BLD: 3.82 M/UL — LOW (ref 4.2–5.8)
RBC # FLD: 13.1 % — SIGNIFICANT CHANGE UP (ref 10.3–14.5)
SODIUM SERPL-SCNC: 134 MMOL/L — LOW (ref 135–145)
SPECIMEN SOURCE: SIGNIFICANT CHANGE UP
WBC # BLD: 10.03 K/UL — SIGNIFICANT CHANGE UP (ref 3.8–10.5)
WBC # FLD AUTO: 10.03 K/UL — SIGNIFICANT CHANGE UP (ref 3.8–10.5)

## 2020-01-17 PROCEDURE — 99239 HOSP IP/OBS DSCHRG MGMT >30: CPT

## 2020-01-17 RX ORDER — INSULIN GLARGINE 100 [IU]/ML
3 INJECTION, SOLUTION SUBCUTANEOUS AT BEDTIME
Refills: 0 | Status: DISCONTINUED | OUTPATIENT
Start: 2020-01-17 | End: 2020-01-24

## 2020-01-17 RX ADMIN — Medication 81 MILLIGRAM(S): at 05:09

## 2020-01-17 RX ADMIN — ATORVASTATIN CALCIUM 10 MILLIGRAM(S): 80 TABLET, FILM COATED ORAL at 21:23

## 2020-01-17 RX ADMIN — Medication 25 MILLIGRAM(S): at 05:09

## 2020-01-17 RX ADMIN — SODIUM CHLORIDE 3 MILLILITER(S): 9 INJECTION INTRAMUSCULAR; INTRAVENOUS; SUBCUTANEOUS at 21:23

## 2020-01-17 RX ADMIN — LISINOPRIL 40 MILLIGRAM(S): 2.5 TABLET ORAL at 05:09

## 2020-01-17 RX ADMIN — Medication 2: at 17:40

## 2020-01-17 RX ADMIN — SODIUM CHLORIDE 3 MILLILITER(S): 9 INJECTION INTRAMUSCULAR; INTRAVENOUS; SUBCUTANEOUS at 06:36

## 2020-01-17 RX ADMIN — SODIUM CHLORIDE 3 MILLILITER(S): 9 INJECTION INTRAMUSCULAR; INTRAVENOUS; SUBCUTANEOUS at 13:14

## 2020-01-17 RX ADMIN — SENNA PLUS 2 TABLET(S): 8.6 TABLET ORAL at 21:23

## 2020-01-17 RX ADMIN — TAMSULOSIN HYDROCHLORIDE 0.4 MILLIGRAM(S): 0.4 CAPSULE ORAL at 21:23

## 2020-01-17 RX ADMIN — Medication 90 MILLIGRAM(S): at 05:09

## 2020-01-17 RX ADMIN — INSULIN GLARGINE 3 UNIT(S): 100 INJECTION, SOLUTION SUBCUTANEOUS at 21:23

## 2020-01-17 NOTE — PHYSICAL THERAPY INITIAL EVALUATION ADULT - PERTINENT HX OF CURRENT PROBLEM, REHAB EVAL
Pt. admitted for fall from bed. Per documentation, CT head no acute pathology, CT maxillofacial no fracture, +soft tissue swelling left periorbital, CT cervical spine multiple degenerative changes, widening of the C6-C7 interspinous space with reversal of the cervical lordosis. MRI cervical spine no evidence for ligamentous injury.
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
## 2020-01-17 NOTE — PROGRESS NOTE ADULT - ASSESSMENT
75M admitted for unwitnessed fall with ecchymosis around eyes, MRI no ligamentous injury, cervical collar removed
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
## 2020-01-17 NOTE — PHYSICAL THERAPY INITIAL EVALUATION ADULT - ADDITIONAL COMMENTS
Pt. poor historian, unable to provide clear information on social history or previous level of function. Per documentation, pt. admitted from rehabilitation facility. Pt. requires assistance with functional mobility and ADLs. Pt. lives in a house with family, has steps to negotiate.     Pt. was left supine in bed post PT Evaluation, no apparent distress, all lines intact.

## 2020-01-17 NOTE — DISCHARGE NOTE PROVIDER - HOSPITAL COURSE
75M admitted for unwitnessed fall with ecchymosis to b/l orbits L > R        Problem/Plan - 1:    ·  Problem: Fall, initial encounter.  Plan: -pt reports fall 2 days ago, CT head no acute path, CT maxillofacial no fracture, +soft tissue swelling left periorbital, CT c spine multiple degenerative changes, widening of the C6-C7 interspinous space with reversal of the cervical lordosis raising the possibility of a ligamentous injury    MRI cervical spine (1/16) - cervical collar discontinued as negative    No evidencefor ligamentous injury. Nonspecific bone marrow edema involves the T1 spinous process.    - [facility]osp[facility] evaluated and no intervention required            Problem/Plan - 2:    ·  Problem: CVA (cerebral vascular accident).  Plan: Continue BP control ASA and statin.         Problem/Plan - 3:    ·  Problem: Diabetes mellitus type 2, insulin dependent.  Plan: A1c 7.3    FS QID    HISS    Continue Lantus.    DM diet.         Problem/Plan - 4:    ·  Problem: CAD (coronary artery disease).  Plan: Continue BB, Statin, ASA,    h/o CABG x1,  d/c plavix.        Problem/Plan - 5:    ·  Problem: Other hyperlipidemia.  Plan: lipid panel reviewed LDL 69    Continue Statin.         Problem/Plan - 6:    Problem: Essential hypertension. Plan: Low salt diet.    Continue BP meds.        Problem/Plan - 7:    ·  Problem: Dementia.  Plan: Currently not on any disease slowing medications.     TSH normal 1.12.             Case reviewed with Dr. carmen who cleared patient for discharge on 1/17/20. 75M admitted for unwitnessed fall with ecchymosis to b/l orbits L > R        Problem/Plan - 1:    ·  Problem: Fall, initial encounter.  Plan: -pt reports fall 2 days ago, CT head no acute path, CT maxillofacial no fracture, +soft tissue swelling left periorbital, CT c spine multiple degenerative changes, widening of the C6-C7 interspinous space with reversal of the cervical lordosis raising the possibility of a ligamentous injury    MRI cervical spine (1/16) - cervical collar discontinued as negative    No evidencefor ligamentous injury. Nonspecific bone marrow edema involves the T1 spinous process.    - Orthospine evaluated and no intervention required        Problem/Plan - 2:    ·  Problem: CVA (cerebral vascular accident).  Plan: Continue BP control ASA and statin.         Problem/Plan - 3:    ·  Problem: Diabetes mellitus type 2, insulin dependent.  Plan: A1c 7.3    FS QID    HISS    Continue Lantus.    DM diet.         Problem/Plan - 4:    ·  Problem: CAD (coronary artery disease).  Plan: Continue BB, Statin, ASA,    h/o CABG x1,  d/c plavix.        Problem/Plan - 5:    ·  Problem: Other hyperlipidemia.  Plan: lipid panel reviewed LDL 69    Continue Statin.         Problem/Plan - 6:    Problem: Essential hypertension. Plan: Low salt diet.    Continue BP meds.        Problem/Plan - 7:    ·  Problem: Dementia.  Plan: Currently not on any disease slowing medications.     TSH normal 1.12.             dispo to rehab -

## 2020-01-17 NOTE — DISCHARGE NOTE PROVIDER - CARE PROVIDER_API CALL
Mira Bishop)  Internal Medicine; Nephrology  [address] [address] [place] [address]  Phone: [phone]  Fax: [phone]  Follow Up Time: Primary Care Provider/ Rehab Doctor,   Phone: (   )    -  Fax: (   )    -  Follow Up Time:

## 2020-01-17 NOTE — DISCHARGE NOTE PROVIDER - PROVIDER TOKENS
PROVIDER:[TOKEN:[[id number]]] FREE:[LAST:[Primary Care Provider/ Rehab Doctor],PHONE:[(   )    -],FAX:[(   )    -]]
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
## 2020-01-17 NOTE — PHYSICAL THERAPY INITIAL EVALUATION ADULT - CRITERIA FOR SKILLED THERAPEUTIC INTERVENTIONS
rehab potential/predicted duration of therapy intervention/therapy frequency/impairments found/risk reduction/prevention/anticipated discharge recommendation

## 2020-01-17 NOTE — DISCHARGE NOTE PROVIDER - NSDCMRMEDTOKEN_GEN_ALL_CORE_FT
acetaminophen 325 mg oral tablet: 2 tab(s) orally every 6 hours, As Needed for pain  aspirin 81 mg oral tablet: 1 tab(s) orally once a day  Colace 100 mg oral capsule: 1 cap(s) orally 2 times a day  HumaLOG 100 units/mL subcutaneous solution: Humalog sliding scale  insulin glargine: 3 unit(s) subcutaneously once a day (at bedtime)  Lipitor 10 mg oral tablet: 1 tab(s) orally once a day  magnesium oxide 400 mg (241.3 mg elemental magnesium) oral tablet: 1 tab(s) orally once a day  Metoprolol Tartrate 25 mg oral tablet: 1 tab(s) orally 2 times a day  NIFEdipine 90 mg oral tablet, extended release: 1 tab(s) orally once a day  Plavix 75 mg oral tablet: 1 tab(s) orally once a day  Senna 8.6 mg oral tablet: 1 tab(s) orally once a day (at bedtime)  tamsulosin 0.4 mg oral capsule: 1 cap(s) orally once a day  Zestril 40 mg oral tablet: 1 tab(s) orally once a day acetaminophen 325 mg oral tablet: 2 tab(s) orally every 6 hours, As Needed for pain  aspirin 81 mg oral tablet: 1 tab(s) orally once a day  Colace 100 mg oral capsule: 1 cap(s) orally 2 times a day  HumaLOG 100 units/mL subcutaneous solution: 2 Unit(s) if Glucose 151 - 200  4 Unit(s) if Glucose 201 - 250  6 Unit(s) if Glucose 251 - 300  8 Unit(s) if Glucose 301 - 350  10 Unit(s) if Glucose 351 - 400  12 Unit(s) if Glucose Greater Than 400  3 times a day before meals  insulin glargine: 3 unit(s) subcutaneously once a day (at bedtime)  insulin lispro (concentrated) 200 units/mL subcutaneous solution: 0 Unit(s) if Glucose 61 - 250  2 Unit(s) if Glucose 251 - 300  4 Unit(s) if Glucose 301 - 350  6 Unit(s) if Glucose 351 - 400  8 Unit(s) if Glucose Greater Than 400   subcutaneous once a day at bedtime  Lipitor 10 mg oral tablet: 1 tab(s) orally once a day  lisinopril 20 mg oral tablet: 1 tab(s) orally once a day  magnesium oxide 400 mg (241.3 mg elemental magnesium) oral tablet: 1 tab(s) orally once a day  Metoprolol Tartrate 25 mg oral tablet: 1 tab(s) orally 2 times a day  NIFEdipine 30 mg oral tablet, extended release: 1 tab(s) orally once a day  pantoprazole 40 mg oral delayed release tablet: 1 tab(s) orally once a day (before a meal)  Senna 8.6 mg oral tablet: 1 tab(s) orally once a day (at bedtime)  tamsulosin 0.4 mg oral capsule: 1 cap(s) orally once a day

## 2020-01-17 NOTE — PHYSICAL THERAPY INITIAL EVALUATION ADULT - GAIT DEVIATIONS NOTED, PT EVAL
decreased barb/increased time in double stance/decreased weight-shifting ability/decreased step length/decreased stride length

## 2020-01-17 NOTE — PROGRESS NOTE ADULT - PROBLEM SELECTOR PLAN 3
A1c 7.3  FS QID  hypoglycemic yesterday  HISS  reduce lantus to 3 units hs, monitor FS, encourage po intake  DM diet.

## 2020-01-18 LAB
ANION GAP SERPL CALC-SCNC: 11 MMO/L — SIGNIFICANT CHANGE UP (ref 7–14)
BUN SERPL-MCNC: 13 MG/DL — SIGNIFICANT CHANGE UP (ref 7–23)
CALCIUM SERPL-MCNC: 8.9 MG/DL — SIGNIFICANT CHANGE UP (ref 8.4–10.5)
CHLORIDE SERPL-SCNC: 105 MMOL/L — SIGNIFICANT CHANGE UP (ref 98–107)
CO2 SERPL-SCNC: 21 MMOL/L — LOW (ref 22–31)
CREAT SERPL-MCNC: 0.89 MG/DL — SIGNIFICANT CHANGE UP (ref 0.5–1.3)
GLUCOSE SERPL-MCNC: 118 MG/DL — HIGH (ref 70–99)
HCT VFR BLD CALC: 32.3 % — LOW (ref 39–50)
HGB BLD-MCNC: 10.6 G/DL — LOW (ref 13–17)
MAGNESIUM SERPL-MCNC: 1.6 MG/DL — SIGNIFICANT CHANGE UP (ref 1.6–2.6)
MCHC RBC-ENTMCNC: 28.3 PG — SIGNIFICANT CHANGE UP (ref 27–34)
MCHC RBC-ENTMCNC: 32.8 % — SIGNIFICANT CHANGE UP (ref 32–36)
MCV RBC AUTO: 86.1 FL — SIGNIFICANT CHANGE UP (ref 80–100)
NRBC # FLD: 0 K/UL — SIGNIFICANT CHANGE UP (ref 0–0)
PHOSPHATE SERPL-MCNC: 3.9 MG/DL — SIGNIFICANT CHANGE UP (ref 2.5–4.5)
PLATELET # BLD AUTO: 314 K/UL — SIGNIFICANT CHANGE UP (ref 150–400)
PMV BLD: 10.4 FL — SIGNIFICANT CHANGE UP (ref 7–13)
POTASSIUM SERPL-MCNC: 3.8 MMOL/L — SIGNIFICANT CHANGE UP (ref 3.5–5.3)
POTASSIUM SERPL-SCNC: 3.8 MMOL/L — SIGNIFICANT CHANGE UP (ref 3.5–5.3)
RBC # BLD: 3.75 M/UL — LOW (ref 4.2–5.8)
RBC # FLD: 13.2 % — SIGNIFICANT CHANGE UP (ref 10.3–14.5)
SODIUM SERPL-SCNC: 137 MMOL/L — SIGNIFICANT CHANGE UP (ref 135–145)
WBC # BLD: 10 K/UL — SIGNIFICANT CHANGE UP (ref 3.8–10.5)
WBC # FLD AUTO: 10 K/UL — SIGNIFICANT CHANGE UP (ref 3.8–10.5)

## 2020-01-18 PROCEDURE — 99232 SBSQ HOSP IP/OBS MODERATE 35: CPT

## 2020-01-18 RX ORDER — PANTOPRAZOLE SODIUM 20 MG/1
40 TABLET, DELAYED RELEASE ORAL
Refills: 0 | Status: DISCONTINUED | OUTPATIENT
Start: 2020-01-18 | End: 2020-01-24

## 2020-01-18 RX ADMIN — INSULIN GLARGINE 3 UNIT(S): 100 INJECTION, SOLUTION SUBCUTANEOUS at 21:53

## 2020-01-18 RX ADMIN — SENNA PLUS 2 TABLET(S): 8.6 TABLET ORAL at 20:38

## 2020-01-18 RX ADMIN — Medication 650 MILLIGRAM(S): at 19:01

## 2020-01-18 RX ADMIN — SODIUM CHLORIDE 3 MILLILITER(S): 9 INJECTION INTRAMUSCULAR; INTRAVENOUS; SUBCUTANEOUS at 14:21

## 2020-01-18 RX ADMIN — ATORVASTATIN CALCIUM 10 MILLIGRAM(S): 80 TABLET, FILM COATED ORAL at 20:38

## 2020-01-18 RX ADMIN — Medication 81 MILLIGRAM(S): at 06:11

## 2020-01-18 RX ADMIN — SODIUM CHLORIDE 3 MILLILITER(S): 9 INJECTION INTRAMUSCULAR; INTRAVENOUS; SUBCUTANEOUS at 20:38

## 2020-01-18 RX ADMIN — Medication 2: at 17:55

## 2020-01-18 RX ADMIN — SODIUM CHLORIDE 3 MILLILITER(S): 9 INJECTION INTRAMUSCULAR; INTRAVENOUS; SUBCUTANEOUS at 06:00

## 2020-01-18 RX ADMIN — TAMSULOSIN HYDROCHLORIDE 0.4 MILLIGRAM(S): 0.4 CAPSULE ORAL at 20:38

## 2020-01-18 RX ADMIN — Medication 650 MILLIGRAM(S): at 17:55
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
## 2020-01-19 LAB
ANION GAP SERPL CALC-SCNC: 9 MMO/L — SIGNIFICANT CHANGE UP (ref 7–14)
BUN SERPL-MCNC: 13 MG/DL — SIGNIFICANT CHANGE UP (ref 7–23)
CALCIUM SERPL-MCNC: 8.7 MG/DL — SIGNIFICANT CHANGE UP (ref 8.4–10.5)
CHLORIDE SERPL-SCNC: 102 MMOL/L — SIGNIFICANT CHANGE UP (ref 98–107)
CO2 SERPL-SCNC: 22 MMOL/L — SIGNIFICANT CHANGE UP (ref 22–31)
CREAT SERPL-MCNC: 0.92 MG/DL — SIGNIFICANT CHANGE UP (ref 0.5–1.3)
GLUCOSE SERPL-MCNC: 166 MG/DL — HIGH (ref 70–99)
HCT VFR BLD CALC: 31 % — LOW (ref 39–50)
HGB BLD-MCNC: 10 G/DL — LOW (ref 13–17)
MAGNESIUM SERPL-MCNC: 1.6 MG/DL — SIGNIFICANT CHANGE UP (ref 1.6–2.6)
MCHC RBC-ENTMCNC: 28.5 PG — SIGNIFICANT CHANGE UP (ref 27–34)
MCHC RBC-ENTMCNC: 32.3 % — SIGNIFICANT CHANGE UP (ref 32–36)
MCV RBC AUTO: 88.3 FL — SIGNIFICANT CHANGE UP (ref 80–100)
NRBC # FLD: 0 K/UL — SIGNIFICANT CHANGE UP (ref 0–0)
PHOSPHATE SERPL-MCNC: 3.9 MG/DL — SIGNIFICANT CHANGE UP (ref 2.5–4.5)
PLATELET # BLD AUTO: 309 K/UL — SIGNIFICANT CHANGE UP (ref 150–400)
PMV BLD: 10.4 FL — SIGNIFICANT CHANGE UP (ref 7–13)
POTASSIUM SERPL-MCNC: 3.8 MMOL/L — SIGNIFICANT CHANGE UP (ref 3.5–5.3)
POTASSIUM SERPL-SCNC: 3.8 MMOL/L — SIGNIFICANT CHANGE UP (ref 3.5–5.3)
RBC # BLD: 3.51 M/UL — LOW (ref 4.2–5.8)
RBC # FLD: 13.6 % — SIGNIFICANT CHANGE UP (ref 10.3–14.5)
SODIUM SERPL-SCNC: 133 MMOL/L — LOW (ref 135–145)
WBC # BLD: 9.96 K/UL — SIGNIFICANT CHANGE UP (ref 3.8–10.5)
WBC # FLD AUTO: 9.96 K/UL — SIGNIFICANT CHANGE UP (ref 3.8–10.5)

## 2020-01-19 PROCEDURE — 99232 SBSQ HOSP IP/OBS MODERATE 35: CPT

## 2020-01-19 RX ORDER — MAGNESIUM SULFATE 500 MG/ML
2 VIAL (ML) INJECTION ONCE
Refills: 0 | Status: COMPLETED | OUTPATIENT
Start: 2020-01-19 | End: 2020-01-19

## 2020-01-19 RX ORDER — SODIUM CHLORIDE 9 MG/ML
500 INJECTION INTRAMUSCULAR; INTRAVENOUS; SUBCUTANEOUS ONCE
Refills: 0 | Status: COMPLETED | OUTPATIENT
Start: 2020-01-19 | End: 2020-01-19

## 2020-01-19 RX ADMIN — Medication 650 MILLIGRAM(S): at 09:48

## 2020-01-19 RX ADMIN — INSULIN GLARGINE 3 UNIT(S): 100 INJECTION, SOLUTION SUBCUTANEOUS at 21:51

## 2020-01-19 RX ADMIN — Medication 4: at 18:01

## 2020-01-19 RX ADMIN — TAMSULOSIN HYDROCHLORIDE 0.4 MILLIGRAM(S): 0.4 CAPSULE ORAL at 21:51

## 2020-01-19 RX ADMIN — Medication 650 MILLIGRAM(S): at 16:04

## 2020-01-19 RX ADMIN — SODIUM CHLORIDE 500 MILLILITER(S): 9 INJECTION INTRAMUSCULAR; INTRAVENOUS; SUBCUTANEOUS at 14:54

## 2020-01-19 RX ADMIN — LISINOPRIL 40 MILLIGRAM(S): 2.5 TABLET ORAL at 05:00

## 2020-01-19 RX ADMIN — Medication 90 MILLIGRAM(S): at 05:00

## 2020-01-19 RX ADMIN — ATORVASTATIN CALCIUM 10 MILLIGRAM(S): 80 TABLET, FILM COATED ORAL at 21:51

## 2020-01-19 RX ADMIN — PANTOPRAZOLE SODIUM 40 MILLIGRAM(S): 20 TABLET, DELAYED RELEASE ORAL at 05:00

## 2020-01-19 RX ADMIN — Medication 650 MILLIGRAM(S): at 10:48

## 2020-01-19 RX ADMIN — Medication 2: at 13:15

## 2020-01-19 RX ADMIN — SENNA PLUS 2 TABLET(S): 8.6 TABLET ORAL at 21:51

## 2020-01-19 RX ADMIN — Medication 50 GRAM(S): at 14:54

## 2020-01-19 RX ADMIN — Medication 25 MILLIGRAM(S): at 05:00

## 2020-01-19 RX ADMIN — Medication 81 MILLIGRAM(S): at 05:00

## 2020-01-19 RX ADMIN — SODIUM CHLORIDE 3 MILLILITER(S): 9 INJECTION INTRAMUSCULAR; INTRAVENOUS; SUBCUTANEOUS at 21:50

## 2020-01-19 RX ADMIN — SODIUM CHLORIDE 3 MILLILITER(S): 9 INJECTION INTRAMUSCULAR; INTRAVENOUS; SUBCUTANEOUS at 05:01

## 2020-01-19 RX ADMIN — Medication 650 MILLIGRAM(S): at 17:03

## 2020-01-19 NOTE — CHART NOTE - NSCHARTNOTEFT_GEN_A_CORE
notified pt bp 87/48 will give 500 cc bolus and monitor notified pt bp 87/48 will give 500 cc bolus and monitor    addendum to above 1/19/20 7pm   repeat bp 116/44

## 2020-01-19 NOTE — PROGRESS NOTE ADULT - SUBJECTIVE AND OBJECTIVE BOX
Dr. Mira Bishop  Pager [phone]    PROGRESS NOTE:     Patient is a 75y old  Male who presents with a chief complaint of Fall x 2 days (18 Jan 2020 14:14)      SUBJECTIVE / OVERNIGHT EVENTS: pt denies chest pain or sob   ADDITIONAL REVIEW OF SYSTEMS: c/o headache and mild stomach pain    MEDICATIONS  (STANDING):  aspirin enteric coated 81 milliGRAM(s) Oral daily  atorvastatin 10 milliGRAM(s) Oral at bedtime  dextrose 5%. 1000 milliLiter(s) (50 mL/Hr) IV Continuous <Continuous>  dextrose 50% Injectable 12.5 Gram(s) IV Push once  dextrose 50% Injectable 25 Gram(s) IV Push once  dextrose 50% Injectable 25 Gram(s) IV Push once  influenza   Vaccine 0.5 milliLiter(s) IntraMuscular once  insulin glargine Injectable (LANTUS) 3 Unit(s) SubCutaneous at bedtime  insulin lispro (HumaLOG) corrective regimen sliding scale   SubCutaneous three times a day before meals  insulin lispro (HumaLOG) corrective regimen sliding scale   SubCutaneous at bedtime  lisinopril 40 milliGRAM(s) Oral daily  magnesium sulfate  IVPB 2 Gram(s) IV Intermittent once  metoprolol tartrate 25 milliGRAM(s) Oral two times a day  NIFEdipine XL 90 milliGRAM(s) Oral daily  pantoprazole    Tablet 40 milliGRAM(s) Oral before breakfast  senna 2 Tablet(s) Oral at bedtime  sodium chloride 0.9% lock flush 3 milliLiter(s) IV Push every 8 hours  tamsulosin 0.4 milliGRAM(s) Oral at bedtime    MEDICATIONS  (PRN):  acetaminophen   Tablet .. 650 milliGRAM(s) Oral every 6 hours PRN Temp greater or equal to 38C (100.4F), Mild Pain (1 - 3), Moderate Pain (4 - 6)  dextrose 40% Gel 15 Gram(s) Oral once PRN Blood Glucose LESS THAN 70 milliGRAM(s)/deciliter  glucagon  Injectable 1 milliGRAM(s) IntraMuscular once PRN Glucose LESS THAN 70 milligrams/deciliter      CAPILLARY BLOOD GLUCOSE      POCT Blood Glucose.: 169 mg/dL (19 Jan 2020 12:33)  POCT Blood Glucose.: 138 mg/dL (19 Jan 2020 08:28)  POCT Blood Glucose.: 132 mg/dL (18 Jan 2020 21:48)  POCT Blood Glucose.: 182 mg/dL (18 Jan 2020 17:32)    I&O's Summary      PHYSICAL EXAM:  Vital Signs Last 24 Hrs  T(C): 36.7 (19 Jan 2020 08:51), Max: 36.8 (18 Jan 2020 20:37)  T(F): 98 (19 Jan 2020 08:51), Max: 98.2 (18 Jan 2020 20:37)  HR: 62 (19 Jan 2020 08:51) (62 - 87)  BP: 97/59 (19 Jan 2020 08:51) (97/59 - 149/61)  BP(mean): --  RR: 18 (19 Jan 2020 08:51) (14 - 18)  SpO2: 97% (19 Jan 2020 08:51) (97% - 100%)  CONSTITUTIONAL: NAD, thin and frail   Face: orbital ecchymosis/soft tissue swelling, hard of hearing  Heent: cervical collar removed, no adenopathy  RESPIRATORY: Normal respiratory effort; lungs are clear to auscultation bilaterally  CARDIOVASCULAR: Regular rate and rhythm, normal S1 and S2, no murmur/rub/gallop; No lower extremity edema; Peripheral pulses are 2+ bilaterally  ABDOMEN: Nontender to palpation, normoactive bowel sounds, no rebound/guarding; No hepatosplenomegaly  MUSCULOSKELETAL: no clubbing or cyanosis of digits; no joint swelling or tenderness to palpation  PSYCH: A+O to person, place, and time; affect appropriate    LABS:                        10.0   9.96  )-----------( 309      ( 19 Jan 2020 07:00 )             31.0     01-19    133<L>  |  102  |  13  ----------------------------<  166<H>  3.8   |  22  |  0.92    Ca    8.7      19 Jan 2020 07:00  Phos  3.9     01-19  Mg     1.6     01-19              RADIOLOGY & ADDITIONAL TESTS:  Results Reviewed:   Imaging Personally Reviewed:    Electrocardiogram Personally Reviewed:    COORDINATION OF CARE:  Care Discussed with Consultants/Other Providers [Y/N]:  Prior or Outpatient Records Reviewed [Y/N]:
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
## 2020-01-20 LAB
ANION GAP SERPL CALC-SCNC: 11 MMO/L — SIGNIFICANT CHANGE UP (ref 7–14)
BUN SERPL-MCNC: 13 MG/DL — SIGNIFICANT CHANGE UP (ref 7–23)
CALCIUM SERPL-MCNC: 8.8 MG/DL — SIGNIFICANT CHANGE UP (ref 8.4–10.5)
CHLORIDE SERPL-SCNC: 102 MMOL/L — SIGNIFICANT CHANGE UP (ref 98–107)
CO2 SERPL-SCNC: 21 MMOL/L — LOW (ref 22–31)
CREAT SERPL-MCNC: 0.91 MG/DL — SIGNIFICANT CHANGE UP (ref 0.5–1.3)
GLUCOSE SERPL-MCNC: 138 MG/DL — HIGH (ref 70–99)
HCT VFR BLD CALC: 30.5 % — LOW (ref 39–50)
HGB BLD-MCNC: 9.9 G/DL — LOW (ref 13–17)
MAGNESIUM SERPL-MCNC: 2.1 MG/DL — SIGNIFICANT CHANGE UP (ref 1.6–2.6)
MCHC RBC-ENTMCNC: 28.2 PG — SIGNIFICANT CHANGE UP (ref 27–34)
MCHC RBC-ENTMCNC: 32.5 % — SIGNIFICANT CHANGE UP (ref 32–36)
MCV RBC AUTO: 86.9 FL — SIGNIFICANT CHANGE UP (ref 80–100)
NRBC # FLD: 0 K/UL — SIGNIFICANT CHANGE UP (ref 0–0)
PHOSPHATE SERPL-MCNC: 3.5 MG/DL — SIGNIFICANT CHANGE UP (ref 2.5–4.5)
PLATELET # BLD AUTO: 306 K/UL — SIGNIFICANT CHANGE UP (ref 150–400)
PMV BLD: 10.4 FL — SIGNIFICANT CHANGE UP (ref 7–13)
POTASSIUM SERPL-MCNC: 4.2 MMOL/L — SIGNIFICANT CHANGE UP (ref 3.5–5.3)
POTASSIUM SERPL-SCNC: 4.2 MMOL/L — SIGNIFICANT CHANGE UP (ref 3.5–5.3)
RBC # BLD: 3.51 M/UL — LOW (ref 4.2–5.8)
RBC # FLD: 13.3 % — SIGNIFICANT CHANGE UP (ref 10.3–14.5)
SODIUM SERPL-SCNC: 134 MMOL/L — LOW (ref 135–145)
WBC # BLD: 9.02 K/UL — SIGNIFICANT CHANGE UP (ref 3.8–10.5)
WBC # FLD AUTO: 9.02 K/UL — SIGNIFICANT CHANGE UP (ref 3.8–10.5)

## 2020-01-20 PROCEDURE — 99232 SBSQ HOSP IP/OBS MODERATE 35: CPT

## 2020-01-20 RX ORDER — NIFEDIPINE 30 MG
60 TABLET, EXTENDED RELEASE 24 HR ORAL DAILY
Refills: 0 | Status: DISCONTINUED | OUTPATIENT
Start: 2020-01-21 | End: 2020-01-21

## 2020-01-20 RX ORDER — SODIUM CHLORIDE 9 MG/ML
1000 INJECTION INTRAMUSCULAR; INTRAVENOUS; SUBCUTANEOUS
Refills: 0 | Status: DISCONTINUED | OUTPATIENT
Start: 2020-01-20 | End: 2020-01-22

## 2020-01-20 RX ORDER — OXYCODONE AND ACETAMINOPHEN 5; 325 MG/1; MG/1
1 TABLET ORAL ONCE
Refills: 0 | Status: DISCONTINUED | OUTPATIENT
Start: 2020-01-20 | End: 2020-01-20

## 2020-01-20 RX ORDER — LISINOPRIL 2.5 MG/1
20 TABLET ORAL DAILY
Refills: 0 | Status: DISCONTINUED | OUTPATIENT
Start: 2020-01-21 | End: 2020-01-22

## 2020-01-20 RX ADMIN — Medication 81 MILLIGRAM(S): at 05:55

## 2020-01-20 RX ADMIN — SENNA PLUS 2 TABLET(S): 8.6 TABLET ORAL at 21:46

## 2020-01-20 RX ADMIN — SODIUM CHLORIDE 3 MILLILITER(S): 9 INJECTION INTRAMUSCULAR; INTRAVENOUS; SUBCUTANEOUS at 13:13

## 2020-01-20 RX ADMIN — OXYCODONE AND ACETAMINOPHEN 1 TABLET(S): 5; 325 TABLET ORAL at 23:20

## 2020-01-20 RX ADMIN — SODIUM CHLORIDE 3 MILLILITER(S): 9 INJECTION INTRAMUSCULAR; INTRAVENOUS; SUBCUTANEOUS at 05:24

## 2020-01-20 RX ADMIN — Medication 25 MILLIGRAM(S): at 05:55

## 2020-01-20 RX ADMIN — OXYCODONE AND ACETAMINOPHEN 1 TABLET(S): 5; 325 TABLET ORAL at 22:30

## 2020-01-20 RX ADMIN — INSULIN GLARGINE 3 UNIT(S): 100 INJECTION, SOLUTION SUBCUTANEOUS at 22:30

## 2020-01-20 RX ADMIN — Medication 650 MILLIGRAM(S): at 17:01

## 2020-01-20 RX ADMIN — Medication 650 MILLIGRAM(S): at 06:38

## 2020-01-20 RX ADMIN — Medication 2: at 13:06

## 2020-01-20 RX ADMIN — Medication 90 MILLIGRAM(S): at 05:56

## 2020-01-20 RX ADMIN — Medication 650 MILLIGRAM(S): at 17:56

## 2020-01-20 RX ADMIN — PANTOPRAZOLE SODIUM 40 MILLIGRAM(S): 20 TABLET, DELAYED RELEASE ORAL at 05:56

## 2020-01-20 RX ADMIN — LISINOPRIL 40 MILLIGRAM(S): 2.5 TABLET ORAL at 05:55

## 2020-01-20 RX ADMIN — SODIUM CHLORIDE 100 MILLILITER(S): 9 INJECTION INTRAMUSCULAR; INTRAVENOUS; SUBCUTANEOUS at 13:10

## 2020-01-20 RX ADMIN — Medication 650 MILLIGRAM(S): at 05:45

## 2020-01-20 RX ADMIN — SODIUM CHLORIDE 3 MILLILITER(S): 9 INJECTION INTRAMUSCULAR; INTRAVENOUS; SUBCUTANEOUS at 21:37

## 2020-01-20 RX ADMIN — ATORVASTATIN CALCIUM 10 MILLIGRAM(S): 80 TABLET, FILM COATED ORAL at 21:46

## 2020-01-20 RX ADMIN — TAMSULOSIN HYDROCHLORIDE 0.4 MILLIGRAM(S): 0.4 CAPSULE ORAL at 21:46

## 2020-01-20 NOTE — PROGRESS NOTE ADULT - PROBLEM SELECTOR PLAN 6
bp soft, gentle hydration  reduce nifedipine from 90 to 60 mg qd, reduce lisinopril from 40 to 20 mg qd  monitor bp
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
## 2020-01-20 NOTE — PROGRESS NOTE ADULT - SUBJECTIVE AND OBJECTIVE BOX
Dr. Mira Bishop  Pager [phone]    PROGRESS NOTE:     Patient is a 75y old  Male who presents with a chief complaint of Fall x 2 days (19 Jan 2020 13:16)      SUBJECTIVE / OVERNIGHT EVENTS: pt with soft bp, c/o headache , no focal complaints  ADDITIONAL REVIEW OF SYSTEMS: no chest pain/sob    MEDICATIONS  (STANDING):  aspirin enteric coated 81 milliGRAM(s) Oral daily  atorvastatin 10 milliGRAM(s) Oral at bedtime  dextrose 5%. 1000 milliLiter(s) (50 mL/Hr) IV Continuous <Continuous>  dextrose 50% Injectable 12.5 Gram(s) IV Push once  dextrose 50% Injectable 25 Gram(s) IV Push once  dextrose 50% Injectable 25 Gram(s) IV Push once  influenza   Vaccine 0.5 milliLiter(s) IntraMuscular once  insulin glargine Injectable (LANTUS) 3 Unit(s) SubCutaneous at bedtime  insulin lispro (HumaLOG) corrective regimen sliding scale   SubCutaneous three times a day before meals  insulin lispro (HumaLOG) corrective regimen sliding scale   SubCutaneous at bedtime  metoprolol tartrate 25 milliGRAM(s) Oral two times a day  pantoprazole    Tablet 40 milliGRAM(s) Oral before breakfast  senna 2 Tablet(s) Oral at bedtime  sodium chloride 0.9% lock flush 3 milliLiter(s) IV Push every 8 hours  sodium chloride 0.9%. 1000 milliLiter(s) (100 mL/Hr) IV Continuous <Continuous>  tamsulosin 0.4 milliGRAM(s) Oral at bedtime    MEDICATIONS  (PRN):  acetaminophen   Tablet .. 650 milliGRAM(s) Oral every 6 hours PRN Temp greater or equal to 38C (100.4F), Mild Pain (1 - 3), Moderate Pain (4 - 6)  dextrose 40% Gel 15 Gram(s) Oral once PRN Blood Glucose LESS THAN 70 milliGRAM(s)/deciliter  glucagon  Injectable 1 milliGRAM(s) IntraMuscular once PRN Glucose LESS THAN 70 milligrams/deciliter      CAPILLARY BLOOD GLUCOSE      POCT Blood Glucose.: 160 mg/dL (20 Jan 2020 12:13)  POCT Blood Glucose.: 109 mg/dL (20 Jan 2020 08:38)  POCT Blood Glucose.: 131 mg/dL (19 Jan 2020 21:14)  POCT Blood Glucose.: 236 mg/dL (19 Jan 2020 17:21)  POCT Blood Glucose.: 169 mg/dL (19 Jan 2020 12:33)    I&O's Summary      PHYSICAL EXAM:  Vital Signs Last 24 Hrs  T(C): 36.2 (20 Jan 2020 10:02), Max: 36.6 (20 Jan 2020 05:24)  T(F): 97.1 (20 Jan 2020 10:02), Max: 97.8 (20 Jan 2020 05:24)  HR: 62 (20 Jan 2020 10:02) (62 - 69)  BP: 103/53 (20 Jan 2020 10:02) (85/49 - 116/44)  BP(mean): --  RR: 16 (20 Jan 2020 10:02) (16 - 18)  SpO2: 99% (20 Jan 2020 10:02) (92% - 100%)  CONSTITUTIONAL: NAD, thin and frail   Face: orbital ecchymosis/soft tissue swelling, hard of hearing  Heent: cervical collar removed, no adenopathy  RESPIRATORY: Normal respiratory effort; lungs are clear to auscultation bilaterally  CARDIOVASCULAR: Regular rate and rhythm, normal S1 and S2, no murmur/rub/gallop; No lower extremity edema; Peripheral pulses are 2+ bilaterally  ABDOMEN: Nontender to palpation, normoactive bowel sounds, no rebound/guarding; No hepatosplenomegaly  MUSCULOSKELETAL: no clubbing or cyanosis of digits; no joint swelling or tenderness to palpation  PSYCH: A+O to person, place, and time; affect appropriate    LABS:                        9.9    9.02  )-----------( 306      ( 20 Jan 2020 05:40 )             30.5     01-20    134<L>  |  102  |  13  ----------------------------<  138<H>  4.2   |  21<L>  |  0.91    Ca    8.8      20 Jan 2020 05:40  Phos  3.5     01-20  Mg     2.1     01-20        RADIOLOGY & ADDITIONAL TESTS:  Results Reviewed:   Imaging Personally Reviewed:  Electrocardiogram Personally Reviewed:    COORDINATION OF CARE:  Care Discussed with Consultants/Other Providers [Y/N]: ronda Ruvalcaba d/eugenie plan different facility, f/u SW  Prior or Outpatient Records Reviewed [Y/N]:
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
## 2020-01-21 DIAGNOSIS — Z71.89 OTHER SPECIFIED COUNSELING: ICD-10-CM

## 2020-01-21 LAB
ANION GAP SERPL CALC-SCNC: 6 MMO/L — LOW (ref 7–14)
BUN SERPL-MCNC: 12 MG/DL — SIGNIFICANT CHANGE UP (ref 7–23)
CALCIUM SERPL-MCNC: 8.9 MG/DL — SIGNIFICANT CHANGE UP (ref 8.4–10.5)
CHLORIDE SERPL-SCNC: 103 MMOL/L — SIGNIFICANT CHANGE UP (ref 98–107)
CO2 SERPL-SCNC: 26 MMOL/L — SIGNIFICANT CHANGE UP (ref 22–31)
CREAT SERPL-MCNC: 0.88 MG/DL — SIGNIFICANT CHANGE UP (ref 0.5–1.3)
GLUCOSE SERPL-MCNC: 114 MG/DL — HIGH (ref 70–99)
HCT VFR BLD CALC: 30.1 % — LOW (ref 39–50)
HGB BLD-MCNC: 9.8 G/DL — LOW (ref 13–17)
MAGNESIUM SERPL-MCNC: 1.5 MG/DL — LOW (ref 1.6–2.6)
MCHC RBC-ENTMCNC: 28 PG — SIGNIFICANT CHANGE UP (ref 27–34)
MCHC RBC-ENTMCNC: 32.6 % — SIGNIFICANT CHANGE UP (ref 32–36)
MCV RBC AUTO: 86 FL — SIGNIFICANT CHANGE UP (ref 80–100)
NRBC # FLD: 0 K/UL — SIGNIFICANT CHANGE UP (ref 0–0)
PHOSPHATE SERPL-MCNC: 3.2 MG/DL — SIGNIFICANT CHANGE UP (ref 2.5–4.5)
PLATELET # BLD AUTO: 308 K/UL — SIGNIFICANT CHANGE UP (ref 150–400)
PMV BLD: 10.1 FL — SIGNIFICANT CHANGE UP (ref 7–13)
POTASSIUM SERPL-MCNC: 4.5 MMOL/L — SIGNIFICANT CHANGE UP (ref 3.5–5.3)
POTASSIUM SERPL-SCNC: 4.5 MMOL/L — SIGNIFICANT CHANGE UP (ref 3.5–5.3)
RBC # BLD: 3.5 M/UL — LOW (ref 4.2–5.8)
RBC # FLD: 13.5 % — SIGNIFICANT CHANGE UP (ref 10.3–14.5)
SODIUM SERPL-SCNC: 135 MMOL/L — SIGNIFICANT CHANGE UP (ref 135–145)
WBC # BLD: 9.13 K/UL — SIGNIFICANT CHANGE UP (ref 3.8–10.5)
WBC # FLD AUTO: 9.13 K/UL — SIGNIFICANT CHANGE UP (ref 3.8–10.5)

## 2020-01-21 PROCEDURE — 99232 SBSQ HOSP IP/OBS MODERATE 35: CPT

## 2020-01-21 RX ORDER — ACETAMINOPHEN 500 MG
650 TABLET ORAL EVERY 6 HOURS
Refills: 0 | Status: DISCONTINUED | OUTPATIENT
Start: 2020-01-21 | End: 2020-01-24

## 2020-01-21 RX ORDER — MAGNESIUM SULFATE 500 MG/ML
1 VIAL (ML) INJECTION ONCE
Refills: 0 | Status: COMPLETED | OUTPATIENT
Start: 2020-01-21 | End: 2020-01-21

## 2020-01-21 RX ORDER — SODIUM CHLORIDE 9 MG/ML
1000 INJECTION INTRAMUSCULAR; INTRAVENOUS; SUBCUTANEOUS
Refills: 0 | Status: DISCONTINUED | OUTPATIENT
Start: 2020-01-21 | End: 2020-01-22

## 2020-01-21 RX ADMIN — Medication 650 MILLIGRAM(S): at 23:45

## 2020-01-21 RX ADMIN — SODIUM CHLORIDE 100 MILLILITER(S): 9 INJECTION INTRAMUSCULAR; INTRAVENOUS; SUBCUTANEOUS at 10:55

## 2020-01-21 RX ADMIN — SODIUM CHLORIDE 3 MILLILITER(S): 9 INJECTION INTRAMUSCULAR; INTRAVENOUS; SUBCUTANEOUS at 13:01

## 2020-01-21 RX ADMIN — Medication 25 MILLIGRAM(S): at 06:54

## 2020-01-21 RX ADMIN — SENNA PLUS 2 TABLET(S): 8.6 TABLET ORAL at 22:58

## 2020-01-21 RX ADMIN — SODIUM CHLORIDE 3 MILLILITER(S): 9 INJECTION INTRAMUSCULAR; INTRAVENOUS; SUBCUTANEOUS at 21:35

## 2020-01-21 RX ADMIN — SODIUM CHLORIDE 3 MILLILITER(S): 9 INJECTION INTRAMUSCULAR; INTRAVENOUS; SUBCUTANEOUS at 06:57

## 2020-01-21 RX ADMIN — Medication 60 MILLIGRAM(S): at 06:54

## 2020-01-21 RX ADMIN — Medication 25 MILLIGRAM(S): at 17:18

## 2020-01-21 RX ADMIN — Medication 2: at 12:50

## 2020-01-21 RX ADMIN — Medication 100 GRAM(S): at 08:46

## 2020-01-21 RX ADMIN — ATORVASTATIN CALCIUM 10 MILLIGRAM(S): 80 TABLET, FILM COATED ORAL at 22:58

## 2020-01-21 RX ADMIN — Medication 650 MILLIGRAM(S): at 22:58

## 2020-01-21 RX ADMIN — LISINOPRIL 20 MILLIGRAM(S): 2.5 TABLET ORAL at 06:54

## 2020-01-21 RX ADMIN — Medication 81 MILLIGRAM(S): at 06:54

## 2020-01-21 RX ADMIN — TAMSULOSIN HYDROCHLORIDE 0.4 MILLIGRAM(S): 0.4 CAPSULE ORAL at 22:58

## 2020-01-21 RX ADMIN — INSULIN GLARGINE 3 UNIT(S): 100 INJECTION, SOLUTION SUBCUTANEOUS at 22:58

## 2020-01-21 RX ADMIN — PANTOPRAZOLE SODIUM 40 MILLIGRAM(S): 20 TABLET, DELAYED RELEASE ORAL at 06:54

## 2020-01-21 NOTE — PROGRESS NOTE ADULT - SUBJECTIVE AND OBJECTIVE BOX
Dr. Mira Bishop  Pager [phone]    PROGRESS NOTE:     Patient is a 75y old  Male who presents with a chief complaint of Fall x 2 days (20 Jan 2020 12:25)      SUBJECTIVE / OVERNIGHT EVENTS: pt has mild pain on forehead with soft tissue swelling  ADDITIONAL REVIEW OF SYSTEMS: no chest pain/sob    MEDICATIONS  (STANDING):  aspirin enteric coated 81 milliGRAM(s) Oral daily  atorvastatin 10 milliGRAM(s) Oral at bedtime  dextrose 5%. 1000 milliLiter(s) (50 mL/Hr) IV Continuous <Continuous>  dextrose 50% Injectable 12.5 Gram(s) IV Push once  dextrose 50% Injectable 25 Gram(s) IV Push once  dextrose 50% Injectable 25 Gram(s) IV Push once  influenza   Vaccine 0.5 milliLiter(s) IntraMuscular once  insulin glargine Injectable (LANTUS) 3 Unit(s) SubCutaneous at bedtime  insulin lispro (HumaLOG) corrective regimen sliding scale   SubCutaneous three times a day before meals  insulin lispro (HumaLOG) corrective regimen sliding scale   SubCutaneous at bedtime  lisinopril 20 milliGRAM(s) Oral daily  metoprolol tartrate 25 milliGRAM(s) Oral two times a day  NIFEdipine XL 60 milliGRAM(s) Oral daily  pantoprazole    Tablet 40 milliGRAM(s) Oral before breakfast  senna 2 Tablet(s) Oral at bedtime  sodium chloride 0.9% lock flush 3 milliLiter(s) IV Push every 8 hours  sodium chloride 0.9%. 1000 milliLiter(s) (100 mL/Hr) IV Continuous <Continuous>  tamsulosin 0.4 milliGRAM(s) Oral at bedtime    MEDICATIONS  (PRN):  acetaminophen   Tablet .. 650 milliGRAM(s) Oral every 6 hours PRN Temp greater or equal to 38C (100.4F), Mild Pain (1 - 3), Moderate Pain (4 - 6)  dextrose 40% Gel 15 Gram(s) Oral once PRN Blood Glucose LESS THAN 70 milliGRAM(s)/deciliter  glucagon  Injectable 1 milliGRAM(s) IntraMuscular once PRN Glucose LESS THAN 70 milligrams/deciliter      CAPILLARY BLOOD GLUCOSE      POCT Blood Glucose.: 122 mg/dL (21 Jan 2020 08:18)  POCT Blood Glucose.: 169 mg/dL (20 Jan 2020 22:18)  POCT Blood Glucose.: 136 mg/dL (20 Jan 2020 17:06)  POCT Blood Glucose.: 160 mg/dL (20 Jan 2020 12:13)    I&O's Summary      PHYSICAL EXAM:  Vital Signs Last 24 Hrs  T(C): 36.3 (21 Jan 2020 06:53), Max: 36.7 (20 Jan 2020 21:44)  T(F): 97.3 (21 Jan 2020 06:53), Max: 98 (20 Jan 2020 21:44)  HR: 71 (21 Jan 2020 06:53) (57 - 71)  BP: 149/65 (21 Jan 2020 06:53) (103/45 - 149/65)  BP(mean): --  RR: 18 (21 Jan 2020 06:53) (16 - 18)  SpO2: 98% (21 Jan 2020 06:53) (98% - 98%)  CONSTITUTIONAL: NAD, thin and frail   Face: orbital ecchymosis/soft tissue swelling, hard of hearing  Heent: cervical collar removed, no adenopathy  RESPIRATORY: Normal respiratory effort; lungs are clear to auscultation bilaterally  CARDIOVASCULAR: Regular rate and rhythm, normal S1 and S2, no murmur/rub/gallop; No lower extremity edema; Peripheral pulses are 2+ bilaterally  ABDOMEN: Nontender to palpation, normoactive bowel sounds, no rebound/guarding; No hepatosplenomegaly  MUSCULOSKELETAL: no clubbing or cyanosis of digits; no joint swelling or tenderness to palpation  PSYCH: A+O to person, place, and time; affect appropriate    LABS:                        9.8    9.13  )-----------( 308      ( 21 Jan 2020 07:13 )             30.1     01-21    135  |  103  |  12  ----------------------------<  114<H>  4.5   |  26  |  0.88    Ca    8.9      21 Jan 2020 07:13  Phos  3.2     01-21  Mg     1.5     01-21      RADIOLOGY & ADDITIONAL TESTS:  Results Reviewed:   Imaging Personally Reviewed:  Electrocardiogram Personally Reviewed:    COORDINATION OF CARE:  Care Discussed with Consultants/Other Providers [Y/N]:  Prior or Outpatient Records Reviewed [Y/N]:

## 2020-01-21 NOTE — PROVIDER CONTACT NOTE (OTHER) - ASSESSMENT
patient stated dizziness when going from a lying to a sitting position and was unable to tolerate standing. Patient currently stated he is asymptomatic
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
## 2020-01-21 NOTE — PROGRESS NOTE ADULT - PROBLEM SELECTOR PLAN 6
bp soft, gentle hydration  reduce nifedipine from 90 to 60 mg qd, reduce lisinopril from 40 to 20 mg qd  monitor bp bp soft, pt orthostatic sbp 120 lying to 90 standing, symptomatic  IVF hydration NS, d/c nifedipine  reduced lisinopril from 40 to 20 mg qd  monitor bp bp soft, pt orthostatic sbp 120 lying to 96 standing, symptomatic  IVF hydration NS, d/c nifedipine  reduced lisinopril from 40 to 20 mg qd  monitor bp

## 2020-01-22 LAB
ANION GAP SERPL CALC-SCNC: 9 MMO/L — SIGNIFICANT CHANGE UP (ref 7–14)
BUN SERPL-MCNC: 12 MG/DL — SIGNIFICANT CHANGE UP (ref 7–23)
CALCIUM SERPL-MCNC: 8.8 MG/DL — SIGNIFICANT CHANGE UP (ref 8.4–10.5)
CHLORIDE SERPL-SCNC: 103 MMOL/L — SIGNIFICANT CHANGE UP (ref 98–107)
CO2 SERPL-SCNC: 24 MMOL/L — SIGNIFICANT CHANGE UP (ref 22–31)
CREAT SERPL-MCNC: 0.87 MG/DL — SIGNIFICANT CHANGE UP (ref 0.5–1.3)
GLUCOSE SERPL-MCNC: 104 MG/DL — HIGH (ref 70–99)
HCT VFR BLD CALC: 30.8 % — LOW (ref 39–50)
HGB BLD-MCNC: 10.1 G/DL — LOW (ref 13–17)
MAGNESIUM SERPL-MCNC: 1.6 MG/DL — SIGNIFICANT CHANGE UP (ref 1.6–2.6)
MCHC RBC-ENTMCNC: 28.4 PG — SIGNIFICANT CHANGE UP (ref 27–34)
MCHC RBC-ENTMCNC: 32.8 % — SIGNIFICANT CHANGE UP (ref 32–36)
MCV RBC AUTO: 86.5 FL — SIGNIFICANT CHANGE UP (ref 80–100)
NRBC # FLD: 0 K/UL — SIGNIFICANT CHANGE UP (ref 0–0)
PLATELET # BLD AUTO: 314 K/UL — SIGNIFICANT CHANGE UP (ref 150–400)
PMV BLD: 10.3 FL — SIGNIFICANT CHANGE UP (ref 7–13)
POTASSIUM SERPL-MCNC: 4.3 MMOL/L — SIGNIFICANT CHANGE UP (ref 3.5–5.3)
POTASSIUM SERPL-SCNC: 4.3 MMOL/L — SIGNIFICANT CHANGE UP (ref 3.5–5.3)
RBC # BLD: 3.56 M/UL — LOW (ref 4.2–5.8)
RBC # FLD: 13.2 % — SIGNIFICANT CHANGE UP (ref 10.3–14.5)
SODIUM SERPL-SCNC: 136 MMOL/L — SIGNIFICANT CHANGE UP (ref 135–145)
WBC # BLD: 8.57 K/UL — SIGNIFICANT CHANGE UP (ref 3.8–10.5)
WBC # FLD AUTO: 8.57 K/UL — SIGNIFICANT CHANGE UP (ref 3.8–10.5)

## 2020-01-22 PROCEDURE — 99233 SBSQ HOSP IP/OBS HIGH 50: CPT

## 2020-01-22 PROCEDURE — 93306 TTE W/DOPPLER COMPLETE: CPT | Mod: 26

## 2020-01-22 RX ORDER — SODIUM CHLORIDE 9 MG/ML
1000 INJECTION INTRAMUSCULAR; INTRAVENOUS; SUBCUTANEOUS
Refills: 0 | Status: DISCONTINUED | OUTPATIENT
Start: 2020-01-22 | End: 2020-01-24

## 2020-01-22 RX ORDER — LISINOPRIL 2.5 MG/1
2.5 TABLET ORAL DAILY
Refills: 0 | Status: DISCONTINUED | OUTPATIENT
Start: 2020-01-22 | End: 2020-01-23

## 2020-01-22 RX ADMIN — Medication 81 MILLIGRAM(S): at 06:01

## 2020-01-22 RX ADMIN — SODIUM CHLORIDE 3 MILLILITER(S): 9 INJECTION INTRAMUSCULAR; INTRAVENOUS; SUBCUTANEOUS at 22:06

## 2020-01-22 RX ADMIN — TAMSULOSIN HYDROCHLORIDE 0.4 MILLIGRAM(S): 0.4 CAPSULE ORAL at 22:09

## 2020-01-22 RX ADMIN — ATORVASTATIN CALCIUM 10 MILLIGRAM(S): 80 TABLET, FILM COATED ORAL at 22:09

## 2020-01-22 RX ADMIN — SODIUM CHLORIDE 3 MILLILITER(S): 9 INJECTION INTRAMUSCULAR; INTRAVENOUS; SUBCUTANEOUS at 13:18

## 2020-01-22 RX ADMIN — SODIUM CHLORIDE 100 MILLILITER(S): 9 INJECTION INTRAMUSCULAR; INTRAVENOUS; SUBCUTANEOUS at 11:37

## 2020-01-22 RX ADMIN — SENNA PLUS 2 TABLET(S): 8.6 TABLET ORAL at 22:07

## 2020-01-22 RX ADMIN — LISINOPRIL 20 MILLIGRAM(S): 2.5 TABLET ORAL at 06:01

## 2020-01-22 RX ADMIN — INSULIN GLARGINE 3 UNIT(S): 100 INJECTION, SOLUTION SUBCUTANEOUS at 22:08

## 2020-01-22 RX ADMIN — Medication 25 MILLIGRAM(S): at 06:01

## 2020-01-22 RX ADMIN — PANTOPRAZOLE SODIUM 40 MILLIGRAM(S): 20 TABLET, DELAYED RELEASE ORAL at 06:01

## 2020-01-22 RX ADMIN — Medication 25 MILLIGRAM(S): at 17:07

## 2020-01-22 RX ADMIN — SODIUM CHLORIDE 3 MILLILITER(S): 9 INJECTION INTRAMUSCULAR; INTRAVENOUS; SUBCUTANEOUS at 06:02

## 2020-01-22 NOTE — PROGRESS NOTE ADULT - SUBJECTIVE AND OBJECTIVE BOX
Dr. Mira Bishop  Pager [phone]    PROGRESS NOTE:     Patient is a 75y old  Male who presents with a chief complaint of Fall x 2 days (21 Jan 2020 10:37)      SUBJECTIVE / OVERNIGHT EVENTS: pt has no specific complaints  ADDITIONAL REVIEW OF SYSTEMS: mild headache    MEDICATIONS  (STANDING):  aspirin enteric coated 81 milliGRAM(s) Oral daily  atorvastatin 10 milliGRAM(s) Oral at bedtime  dextrose 5%. 1000 milliLiter(s) (50 mL/Hr) IV Continuous <Continuous>  dextrose 50% Injectable 12.5 Gram(s) IV Push once  dextrose 50% Injectable 25 Gram(s) IV Push once  dextrose 50% Injectable 25 Gram(s) IV Push once  influenza   Vaccine 0.5 milliLiter(s) IntraMuscular once  insulin glargine Injectable (LANTUS) 3 Unit(s) SubCutaneous at bedtime  insulin lispro (HumaLOG) corrective regimen sliding scale   SubCutaneous three times a day before meals  insulin lispro (HumaLOG) corrective regimen sliding scale   SubCutaneous at bedtime  lisinopril 20 milliGRAM(s) Oral daily  metoprolol tartrate 25 milliGRAM(s) Oral two times a day  pantoprazole    Tablet 40 milliGRAM(s) Oral before breakfast  senna 2 Tablet(s) Oral at bedtime  sodium chloride 0.9% lock flush 3 milliLiter(s) IV Push every 8 hours  sodium chloride 0.9%. 1000 milliLiter(s) (100 mL/Hr) IV Continuous <Continuous>  sodium chloride 0.9%. 1000 milliLiter(s) (100 mL/Hr) IV Continuous <Continuous>  tamsulosin 0.4 milliGRAM(s) Oral at bedtime    MEDICATIONS  (PRN):  acetaminophen   Tablet .. 650 milliGRAM(s) Oral every 6 hours PRN Mild Pain (1 - 3), Moderate Pain (4 - 6), Severe Pain (7 - 10)  dextrose 40% Gel 15 Gram(s) Oral once PRN Blood Glucose LESS THAN 70 milliGRAM(s)/deciliter  glucagon  Injectable 1 milliGRAM(s) IntraMuscular once PRN Glucose LESS THAN 70 milligrams/deciliter      CAPILLARY BLOOD GLUCOSE      POCT Blood Glucose.: 108 mg/dL (22 Jan 2020 08:48)  POCT Blood Glucose.: 129 mg/dL (21 Jan 2020 22:08)  POCT Blood Glucose.: 148 mg/dL (21 Jan 2020 17:04)  POCT Blood Glucose.: 151 mg/dL (21 Jan 2020 12:18)    I&O's Summary    21 Jan 2020 07:01  -  22 Jan 2020 07:00  --------------------------------------------------------  IN: 200 mL / OUT: 1000 mL / NET: -800 mL        PHYSICAL EXAM:  Vital Signs Last 24 Hrs  T(C): 36.3 (22 Jan 2020 05:50), Max: 36.6 (21 Jan 2020 21:50)  T(F): 97.3 (22 Jan 2020 05:50), Max: 97.9 (21 Jan 2020 21:50)  HR: 62 (22 Jan 2020 05:50) (62 - 66)  BP: 139/65 (22 Jan 2020 05:50) (135/55 - 141/65)  BP(mean): --  RR: 18 (22 Jan 2020 05:50) (18 - 18)  SpO2: 98% (22 Jan 2020 05:50) (96% - 98%)  CONSTITUTIONAL: NAD, thin and frail   Face: orbital ecchymosis/soft tissue swelling, hard of hearing  Heent: no adenopathy  RESPIRATORY: Normal respiratory effort; lungs are clear to auscultation bilaterally  CARDIOVASCULAR: Regular rate and rhythm, normal S1 and S2, no murmur/rub/gallop; No lower extremity edema; Peripheral pulses are 2+ bilaterally  ABDOMEN: Nontender to palpation, normoactive bowel sounds, no rebound/guarding; No hepatosplenomegaly  MUSCULOSKELETAL: no clubbing or cyanosis of digits; no joint swelling or tenderness to palpation  PSYCH: A+O to person, place, and time; affect appropriate    LABS:                        10.1   8.57  )-----------( 314      ( 22 Jan 2020 06:30 )             30.8     01-22    136  |  103  |  12  ----------------------------<  104<H>  4.3   |  24  |  0.87    Ca    8.8      22 Jan 2020 06:30  Phos  3.2     01-21  Mg     1.6 01-22      RADIOLOGY & ADDITIONAL TESTS:  Results Reviewed:   Imaging Personally Reviewed:  Electrocardiogram Personally Reviewed:    COORDINATION OF CARE:  Care Discussed with Consultants/Other Providers [Y/N]: wendy Bunn/eugenie plan to facility when bed available, monitor orthostatics  Prior or Outpatient Records Reviewed [Y/N]:
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
## 2020-01-22 NOTE — PROGRESS NOTE ADULT - PROBLEM SELECTOR PLAN 6
pt orthostatic yesterday, d/c'd nifedpine, reduced lisinopril from 40 to 20 mg qd  monitor bp, repeat orthostatics   s/p IVF pt orthostatic yesterday, d/c'd nifedpine and d/c lisinopril  monitor bp, repeat orthostatics   s/p IVF

## 2020-01-23 LAB
ANION GAP SERPL CALC-SCNC: 7 MMO/L — SIGNIFICANT CHANGE UP (ref 7–14)
BUN SERPL-MCNC: 13 MG/DL — SIGNIFICANT CHANGE UP (ref 7–23)
CALCIUM SERPL-MCNC: 9.4 MG/DL — SIGNIFICANT CHANGE UP (ref 8.4–10.5)
CHLORIDE SERPL-SCNC: 101 MMOL/L — SIGNIFICANT CHANGE UP (ref 98–107)
CO2 SERPL-SCNC: 24 MMOL/L — SIGNIFICANT CHANGE UP (ref 22–31)
CREAT SERPL-MCNC: 0.77 MG/DL — SIGNIFICANT CHANGE UP (ref 0.5–1.3)
GLUCOSE SERPL-MCNC: 121 MG/DL — HIGH (ref 70–99)
HCT VFR BLD CALC: 34.7 % — LOW (ref 39–50)
HGB BLD-MCNC: 11.4 G/DL — LOW (ref 13–17)
MAGNESIUM SERPL-MCNC: 1.6 MG/DL — SIGNIFICANT CHANGE UP (ref 1.6–2.6)
MCHC RBC-ENTMCNC: 28.6 PG — SIGNIFICANT CHANGE UP (ref 27–34)
MCHC RBC-ENTMCNC: 32.9 % — SIGNIFICANT CHANGE UP (ref 32–36)
MCV RBC AUTO: 87.2 FL — SIGNIFICANT CHANGE UP (ref 80–100)
NRBC # FLD: 0 K/UL — SIGNIFICANT CHANGE UP (ref 0–0)
PLATELET # BLD AUTO: 360 K/UL — SIGNIFICANT CHANGE UP (ref 150–400)
PMV BLD: 11 FL — SIGNIFICANT CHANGE UP (ref 7–13)
POTASSIUM SERPL-MCNC: 5.4 MMOL/L — HIGH (ref 3.5–5.3)
POTASSIUM SERPL-SCNC: 5.4 MMOL/L — HIGH (ref 3.5–5.3)
RBC # BLD: 3.98 M/UL — LOW (ref 4.2–5.8)
RBC # FLD: 13.3 % — SIGNIFICANT CHANGE UP (ref 10.3–14.5)
SODIUM SERPL-SCNC: 132 MMOL/L — LOW (ref 135–145)
WBC # BLD: 9.03 K/UL — SIGNIFICANT CHANGE UP (ref 3.8–10.5)
WBC # FLD AUTO: 9.03 K/UL — SIGNIFICANT CHANGE UP (ref 3.8–10.5)

## 2020-01-23 PROCEDURE — 99233 SBSQ HOSP IP/OBS HIGH 50: CPT

## 2020-01-23 RX ORDER — NIFEDIPINE 30 MG
1 TABLET, EXTENDED RELEASE 24 HR ORAL
Qty: 0 | Refills: 0 | DISCHARGE
Start: 2020-01-23

## 2020-01-23 RX ORDER — CLOPIDOGREL BISULFATE 75 MG/1
1 TABLET, FILM COATED ORAL
Qty: 0 | Refills: 0 | DISCHARGE

## 2020-01-23 RX ORDER — NIFEDIPINE 30 MG
1 TABLET, EXTENDED RELEASE 24 HR ORAL
Qty: 0 | Refills: 0 | DISCHARGE

## 2020-01-23 RX ORDER — INSULIN LISPRO 100/ML
0 VIAL (ML) SUBCUTANEOUS
Qty: 0 | Refills: 0 | DISCHARGE

## 2020-01-23 RX ORDER — PANTOPRAZOLE SODIUM 20 MG/1
1 TABLET, DELAYED RELEASE ORAL
Qty: 0 | Refills: 0 | DISCHARGE
Start: 2020-01-23

## 2020-01-23 RX ORDER — LISINOPRIL 2.5 MG/1
1 TABLET ORAL
Qty: 0 | Refills: 0 | DISCHARGE
Start: 2020-01-23

## 2020-01-23 RX ORDER — INSULIN LISPRO 100/ML
0 VIAL (ML) SUBCUTANEOUS
Qty: 0 | Refills: 0 | DISCHARGE
Start: 2020-01-23

## 2020-01-23 RX ORDER — NIFEDIPINE 30 MG
30 TABLET, EXTENDED RELEASE 24 HR ORAL DAILY
Refills: 0 | Status: DISCONTINUED | OUTPATIENT
Start: 2020-01-23 | End: 2020-01-24

## 2020-01-23 RX ORDER — LISINOPRIL 2.5 MG/1
20 TABLET ORAL DAILY
Refills: 0 | Status: DISCONTINUED | OUTPATIENT
Start: 2020-01-23 | End: 2020-01-24

## 2020-01-23 RX ADMIN — SODIUM CHLORIDE 3 MILLILITER(S): 9 INJECTION INTRAMUSCULAR; INTRAVENOUS; SUBCUTANEOUS at 12:48

## 2020-01-23 RX ADMIN — TAMSULOSIN HYDROCHLORIDE 0.4 MILLIGRAM(S): 0.4 CAPSULE ORAL at 21:40

## 2020-01-23 RX ADMIN — LISINOPRIL 2.5 MILLIGRAM(S): 2.5 TABLET ORAL at 00:02

## 2020-01-23 RX ADMIN — ATORVASTATIN CALCIUM 10 MILLIGRAM(S): 80 TABLET, FILM COATED ORAL at 21:40

## 2020-01-23 RX ADMIN — SODIUM CHLORIDE 3 MILLILITER(S): 9 INJECTION INTRAMUSCULAR; INTRAVENOUS; SUBCUTANEOUS at 21:31

## 2020-01-23 RX ADMIN — Medication 25 MILLIGRAM(S): at 17:45

## 2020-01-23 RX ADMIN — Medication 2: at 17:44

## 2020-01-23 RX ADMIN — Medication 30 MILLIGRAM(S): at 10:32

## 2020-01-23 RX ADMIN — SODIUM CHLORIDE 3 MILLILITER(S): 9 INJECTION INTRAMUSCULAR; INTRAVENOUS; SUBCUTANEOUS at 05:31

## 2020-01-23 RX ADMIN — INSULIN GLARGINE 3 UNIT(S): 100 INJECTION, SOLUTION SUBCUTANEOUS at 21:40

## 2020-01-23 RX ADMIN — Medication 81 MILLIGRAM(S): at 05:58

## 2020-01-23 RX ADMIN — Medication 2: at 12:47

## 2020-01-23 RX ADMIN — Medication 25 MILLIGRAM(S): at 05:58

## 2020-01-23 RX ADMIN — LISINOPRIL 20 MILLIGRAM(S): 2.5 TABLET ORAL at 10:32

## 2020-01-23 RX ADMIN — PANTOPRAZOLE SODIUM 40 MILLIGRAM(S): 20 TABLET, DELAYED RELEASE ORAL at 05:58

## 2020-01-23 NOTE — DIETITIAN INITIAL EVALUATION ADULT. - ADD RECOMMEND
1. D/c DASH restriction. 2. Add Glucerna 2x daily (440kcals, 20g protein) 3. Encourage PO intake and honor food preferences as able.

## 2020-01-23 NOTE — CHART NOTE - NSCHARTNOTEFT_GEN_A_CORE
NUTRITION SERVICES     Upon Nutritional Assessment by the Registered Dietitian your patient was determined to meet criteria/ has evidence of the following diagnosis/diagnoses:  [ ] Mild Protein Calorie Malnutrition   [X ] Moderate Protein Calorie Malnutrition   [ ] Severe Protein Calorie Malnutrition   [ ] Unspecified Protein Calorie Malnutrition   [ ] Underweight / BMI <19  [ ] Morbid Obesity / BMI >40    Findings as based on:  •  Comprehensive nutritional assessment and consultation    Please refer to Initial Dietitian Evaluation via documents section of [facility] EMR for further recommendations.

## 2020-01-23 NOTE — PROGRESS NOTE ADULT - PROBLEM SELECTOR PLAN 6
pt was orthostatic, but now hypertensive  resume ACEI and nifedipine at a lower dose, lisinopril 20 mg qd, and nifedipine 30 mg qd, monitor bp  s/p IVF
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
## 2020-01-23 NOTE — DIETITIAN INITIAL EVALUATION ADULT. - PROBLEM SELECTOR PROBLEM 8
Eliptical Excision Additional Text (Leave Blank If You Do Not Want): The margin was drawn around the clinically apparent lesion.  An elliptical shape was then drawn on the skin incorporating the lesion and margins.  Incisions were then made along these lines to the appropriate tissue plane and the lesion was extirpated. Need for prophylactic measure

## 2020-01-23 NOTE — PROGRESS NOTE ADULT - SUBJECTIVE AND OBJECTIVE BOX
Dr. Mira Bishop  Pager [phone]    PROGRESS NOTE:     Patient is a 75y old  Male who presents with a chief complaint of Fall x 2 days (22 Jan 2020 11:13)      SUBJECTIVE / OVERNIGHT EVENTS: pt now hypertensive, feels ok  ADDITIONAL REVIEW OF SYSTEMS: no chest pain/sob    MEDICATIONS  (STANDING):  aspirin enteric coated 81 milliGRAM(s) Oral daily  atorvastatin 10 milliGRAM(s) Oral at bedtime  dextrose 5%. 1000 milliLiter(s) (50 mL/Hr) IV Continuous <Continuous>  dextrose 50% Injectable 12.5 Gram(s) IV Push once  dextrose 50% Injectable 25 Gram(s) IV Push once  dextrose 50% Injectable 25 Gram(s) IV Push once  influenza   Vaccine 0.5 milliLiter(s) IntraMuscular once  insulin glargine Injectable (LANTUS) 3 Unit(s) SubCutaneous at bedtime  insulin lispro (HumaLOG) corrective regimen sliding scale   SubCutaneous three times a day before meals  insulin lispro (HumaLOG) corrective regimen sliding scale   SubCutaneous at bedtime  lisinopril 20 milliGRAM(s) Oral daily  metoprolol tartrate 25 milliGRAM(s) Oral two times a day  NIFEdipine XL 30 milliGRAM(s) Oral daily  pantoprazole    Tablet 40 milliGRAM(s) Oral before breakfast  senna 2 Tablet(s) Oral at bedtime  sodium chloride 0.9% lock flush 3 milliLiter(s) IV Push every 8 hours  sodium chloride 0.9%. 1000 milliLiter(s) (100 mL/Hr) IV Continuous <Continuous>  tamsulosin 0.4 milliGRAM(s) Oral at bedtime    MEDICATIONS  (PRN):  acetaminophen   Tablet .. 650 milliGRAM(s) Oral every 6 hours PRN Mild Pain (1 - 3), Moderate Pain (4 - 6), Severe Pain (7 - 10)  dextrose 40% Gel 15 Gram(s) Oral once PRN Blood Glucose LESS THAN 70 milliGRAM(s)/deciliter  glucagon  Injectable 1 milliGRAM(s) IntraMuscular once PRN Glucose LESS THAN 70 milligrams/deciliter      CAPILLARY BLOOD GLUCOSE      POCT Blood Glucose.: 145 mg/dL (23 Jan 2020 08:49)  POCT Blood Glucose.: 154 mg/dL (22 Jan 2020 21:27)  POCT Blood Glucose.: 140 mg/dL (22 Jan 2020 17:35)  POCT Blood Glucose.: 140 mg/dL (22 Jan 2020 12:50)    I&O's Summary    22 Jan 2020 07:01  -  23 Jan 2020 07:00  --------------------------------------------------------  IN: 400 mL / OUT: 800 mL / NET: -400 mL        PHYSICAL EXAM:  Vital Signs Last 24 Hrs  T(C): 36.5 (23 Jan 2020 10:30), Max: 36.6 (22 Jan 2020 19:32)  T(F): 97.7 (23 Jan 2020 10:30), Max: 97.8 (22 Jan 2020 19:32)  HR: 60 (23 Jan 2020 10:30) (60 - 72)  BP: 151/75 (23 Jan 2020 10:30) (142/64 - 185/86)  BP(mean): --  RR: 18 (23 Jan 2020 10:30) (17 - 18)  SpO2: 100% (23 Jan 2020 10:30) (97% - 100%)  CONSTITUTIONAL: NAD, thin and frail   Face: orbital ecchymosis/soft tissue swelling, hard of hearing  Heent: no adenopathy  RESPIRATORY: Normal respiratory effort; lungs are clear to auscultation bilaterally  CARDIOVASCULAR: Regular rate and rhythm, normal S1 and S2, no murmur/rub/gallop; No lower extremity edema; Peripheral pulses are 2+ bilaterally  ABDOMEN: Nontender to palpation, normoactive bowel sounds, no rebound/guarding; No hepatosplenomegaly  MUSCULOSKELETAL: no clubbing or cyanosis of digits; no joint swelling or tenderness to palpation  PSYCH: A+O to person, place, and time; affect appropriate    LABS:                        11.4   9.03  )-----------( 360      ( 23 Jan 2020 06:00 )             34.7     01-23    132<L>  |  101  |  13  ----------------------------<  121<H>  5.4<H>   |  24  |  0.77    Ca    9.4      23 Jan 2020 06:00  Mg     1.6     01-23      RADIOLOGY & ADDITIONAL TESTS:  Results Reviewed:   Imaging Personally Reviewed:  < from: Transthoracic Echocardiogram (01.22.20 @ 20:02) >  ------------------------------------------------------------------------  CONCLUSIONS:  LVEF 65-70%  1.Increased relative wall thickness with normal left  ventricular mass index, consistent with concentric left  ventricular remodeling.  2. Hyperdynamic left ventricle.  3. The right ventricle is not well visualized; grossly  normal right ventricular systolic function.  4. Inadequate tricuspid regurgitation Doppler envelope  precludes estimation of RVSP.  *** No previous Echo exam.    Electrocardiogram Personally Reviewed:    COORDINATION OF CARE:  Care Discussed with Consultants/Other Providers [Y/N]:  Prior or Outpatient Records Reviewed [Y/N]:

## 2020-01-23 NOTE — DIETITIAN INITIAL EVALUATION ADULT. - PERTINENT MEDS FT
MEDICATIONS  (STANDING):  aspirin enteric coated 81 milliGRAM(s) Oral daily  atorvastatin 10 milliGRAM(s) Oral at bedtime  dextrose 5%. 1000 milliLiter(s) (50 mL/Hr) IV Continuous <Continuous>  dextrose 50% Injectable 12.5 Gram(s) IV Push once  dextrose 50% Injectable 25 Gram(s) IV Push once  dextrose 50% Injectable 25 Gram(s) IV Push once  influenza   Vaccine 0.5 milliLiter(s) IntraMuscular once  insulin glargine Injectable (LANTUS) 3 Unit(s) SubCutaneous at bedtime  insulin lispro (HumaLOG) corrective regimen sliding scale   SubCutaneous three times a day before meals  insulin lispro (HumaLOG) corrective regimen sliding scale   SubCutaneous at bedtime  lisinopril 20 milliGRAM(s) Oral daily  metoprolol tartrate 25 milliGRAM(s) Oral two times a day  NIFEdipine XL 30 milliGRAM(s) Oral daily  pantoprazole    Tablet 40 milliGRAM(s) Oral before breakfast  senna 2 Tablet(s) Oral at bedtime  sodium chloride 0.9% lock flush 3 milliLiter(s) IV Push every 8 hours  sodium chloride 0.9%. 1000 milliLiter(s) (100 mL/Hr) IV Continuous <Continuous>  tamsulosin 0.4 milliGRAM(s) Oral at bedtime

## 2020-01-23 NOTE — PROVIDER CONTACT NOTE (OTHER) - BACKGROUND
pt is 74yo male who came to [facility] with [facility] eye.
Dementia, HTN, HLD, CVA (no defecits) , DM, CAD s/p CABG
Patient is a 74 yo male, admitted for unwitnessed fall. Hx of HTN, CAD, BPH, HTN, DM2
admitted for fall   HTN
orthostatic q12h   admitted for fall
physical therapy came to see patient and took blood pressure when patient stated he was feeling dizzy
pt has orders for orthostatic measurements

## 2020-01-23 NOTE — PROVIDER CONTACT NOTE (OTHER) - ASSESSMENT
Pt orthostatic BP laying 185/89 and HR 67, ortho BP sitting up 195/92 and HR 65. Pt unable to tolerate standing BP. Pt asymptomatic.

## 2020-01-23 NOTE — DIETITIAN INITIAL EVALUATION ADULT. - OTHER INFO
76 y/o M presented s/p fall from [facility]. Hx DM2 ( HbA1c 7.2%); dementia. PO intake likely meeting </= 50% needs during admission. Per PCA, pt ate "very little" for breakfast today. Pt provided little detail with PO intake PTA.  Pt with poor dentition, amenable to soft diet. No GI distress (nausea/vomiting/diarrhea/constipation.) No swallowing difficulties. Per RD note Nov. 2019, pt weighed 45.2 kg. Admit wt 40.4 kg. Bed wt taken by RD 1/23 42.8 kg.

## 2020-01-23 NOTE — PROVIDER CONTACT NOTE (OTHER) - ACTION/TREATMENT ORDERED:
Will give 4 ounces of apple juice and recheck FS in 15 minutes until FS is above 100 x2. Will give 5 units of lantus if above 100 x2 as per Marie Yi
Magalis WRIGHT, informed. ECHO and IVF ordered. Given as per orders. Will continue to monitor and follow up with any further instructions given
n/a
Glucose gel was activated, pt is not NPO and can take oral gel. Will recheck FS in 15 minutes until FS above 100 x2
KYLE, Magalis, informed. Echo ordered. Will follow up with any further instructions given and continue to monitor.
tele pa notified and will cont. to monitor.
tele pa notified. Will give medication as per orders and reassess BP.
500cc Bolus ordered - reassess in 4 hrs
KYLE Anton notified.

## 2020-01-23 NOTE — DIETITIAN INITIAL EVALUATION ADULT. - PHYSICAL APPEARANCE
other (specify)/underweight Nutrition focused physical exam conducted - found signs of malnutrition [ ]absent [x ]present   Subcutaneous fat loss: [moderate ] Orbital fat pads region, [ mild]Buccal fat region, [ moderate]Triceps region,  [ mild]Ribs region  Muscle wasting: [ moderate]Temples region, [ moderate]Clavicle region, [ moderate]Shoulder region

## 2020-01-23 NOTE — DIETITIAN INITIAL EVALUATION ADULT. - PERTINENT LABORATORY DATA
01-23 Na 132 mmol/L<L> Glu 121 mg/dL<H> K+ 5.4 mmol/L<H> Cr 0.77 mg/dL BUN 13 mg/dL Phos n/a    01-16-20 HbA1c 7.3 %  01-23 @ 12:45 POCT 174 mg/dL  01-23 @ 08:49 POCT 145 mg/dL  01-22 @ 21:27 POCT 154 mg/dL  01-22 @ 17:35 POCT 140 mg/dL

## 2020-01-23 NOTE — DIETITIAN INITIAL EVALUATION ADULT. - DIET TYPE
Patient would like a call back to discuss swelling on his right knee and some discoloration. Patient had surgery 10/17/17. Please call patient.    soft/Halal, vegetarian, no nuts/consistent carbohydrate (evening snack)

## 2020-01-24 ENCOUNTER — TRANSCRIPTION ENCOUNTER (OUTPATIENT)
Age: 75
End: 2020-01-24

## 2020-01-24 VITALS
SYSTOLIC BLOOD PRESSURE: 127 MMHG | OXYGEN SATURATION: 98 % | TEMPERATURE: 98 F | RESPIRATION RATE: 17 BRPM | HEART RATE: 79 BPM | DIASTOLIC BLOOD PRESSURE: 73 MMHG

## 2020-01-24 PROCEDURE — 99232 SBSQ HOSP IP/OBS MODERATE 35: CPT

## 2020-01-24 RX ADMIN — Medication 25 MILLIGRAM(S): at 06:04

## 2020-01-24 RX ADMIN — PANTOPRAZOLE SODIUM 40 MILLIGRAM(S): 20 TABLET, DELAYED RELEASE ORAL at 06:03

## 2020-01-24 RX ADMIN — LISINOPRIL 20 MILLIGRAM(S): 2.5 TABLET ORAL at 06:03

## 2020-01-24 RX ADMIN — Medication 30 MILLIGRAM(S): at 06:03

## 2020-01-24 RX ADMIN — SODIUM CHLORIDE 3 MILLILITER(S): 9 INJECTION INTRAMUSCULAR; INTRAVENOUS; SUBCUTANEOUS at 14:43

## 2020-01-24 RX ADMIN — SODIUM CHLORIDE 3 MILLILITER(S): 9 INJECTION INTRAMUSCULAR; INTRAVENOUS; SUBCUTANEOUS at 06:05

## 2020-01-24 RX ADMIN — Medication 81 MILLIGRAM(S): at 06:03

## 2020-01-24 RX ADMIN — Medication 2: at 09:23

## 2020-01-24 NOTE — DISCHARGE NOTE NURSING/CASE MANAGEMENT/SOCIAL WORK - PATIENT PORTAL LINK FT
You can access the FollowMyHealth Patient Portal offered by [facility] by registering at the following website: [email or web address]. By joining [employer]’s FollowMyHealth portal, you will also be able to view your health information using other applications (apps) compatible with our system.

## 2020-01-24 NOTE — PROGRESS NOTE ADULT - PROBLEM SELECTOR PLAN 5
lipid panel reviewed LDL 69  Continue Statin  pt has moderate protein/calorie malnutrition per nutrition eval

## 2020-01-24 NOTE — DISCHARGE NOTE NURSING/CASE MANAGEMENT/SOCIAL WORK - NSDCPEPTSTRK_GEN_ALL_CORE
Prescribed medications/Need for follow up after discharge/Stroke education booklet/Stroke warning signs and symptoms/Risk factors for stroke/Signs and symptoms of stroke/Call 911 for stroke/Stroke support groups for patients, families, and friends

## 2020-01-24 NOTE — PROGRESS NOTE ADULT - PROBLEM SELECTOR PLAN 6
pt was orthostatic, but now hypertensive  resumed ACEI and nifedipine at a lower dose, lisinopril 20 mg qd, and nifedipine 30 mg qd, monitor bp  s/p IVF

## 2020-01-24 NOTE — PROGRESS NOTE ADULT - REASON FOR ADMISSION
Fall x 2 days

## 2020-01-24 NOTE — PROGRESS NOTE ADULT - SUBJECTIVE AND OBJECTIVE BOX
Dr. Mira Bishop  Pager [phone]    PROGRESS NOTE:     Patient is a 75y old  Male who presents with a chief complaint of Fall x 2 days (23 Jan 2020 11:24)      SUBJECTIVE / OVERNIGHT EVENTS: pt denies chest pain or sob   ADDITIONAL REVIEW OF SYSTEMS: waiting for placement    MEDICATIONS  (STANDING):  aspirin enteric coated 81 milliGRAM(s) Oral daily  atorvastatin 10 milliGRAM(s) Oral at bedtime  dextrose 5%. 1000 milliLiter(s) (50 mL/Hr) IV Continuous <Continuous>  dextrose 50% Injectable 12.5 Gram(s) IV Push once  dextrose 50% Injectable 25 Gram(s) IV Push once  dextrose 50% Injectable 25 Gram(s) IV Push once  influenza   Vaccine 0.5 milliLiter(s) IntraMuscular once  insulin glargine Injectable (LANTUS) 3 Unit(s) SubCutaneous at bedtime  insulin lispro (HumaLOG) corrective regimen sliding scale   SubCutaneous three times a day before meals  insulin lispro (HumaLOG) corrective regimen sliding scale   SubCutaneous at bedtime  lisinopril 20 milliGRAM(s) Oral daily  metoprolol tartrate 25 milliGRAM(s) Oral two times a day  NIFEdipine XL 30 milliGRAM(s) Oral daily  pantoprazole    Tablet 40 milliGRAM(s) Oral before breakfast  senna 2 Tablet(s) Oral at bedtime  sodium chloride 0.9% lock flush 3 milliLiter(s) IV Push every 8 hours  sodium chloride 0.9%. 1000 milliLiter(s) (100 mL/Hr) IV Continuous <Continuous>  tamsulosin 0.4 milliGRAM(s) Oral at bedtime    MEDICATIONS  (PRN):  acetaminophen   Tablet .. 650 milliGRAM(s) Oral every 6 hours PRN Mild Pain (1 - 3), Moderate Pain (4 - 6), Severe Pain (7 - 10)  dextrose 40% Gel 15 Gram(s) Oral once PRN Blood Glucose LESS THAN 70 milliGRAM(s)/deciliter  glucagon  Injectable 1 milliGRAM(s) IntraMuscular once PRN Glucose LESS THAN 70 milligrams/deciliter      CAPILLARY BLOOD GLUCOSE      POCT Blood Glucose.: 166 mg/dL (24 Jan 2020 09:03)  POCT Blood Glucose.: 126 mg/dL (23 Jan 2020 21:27)  POCT Blood Glucose.: 187 mg/dL (23 Jan 2020 17:39)  POCT Blood Glucose.: 174 mg/dL (23 Jan 2020 12:45)    I&O's Summary    23 Jan 2020 07:01  -  24 Jan 2020 07:00  --------------------------------------------------------  IN: 0 mL / OUT: 850 mL / NET: -850 mL        PHYSICAL EXAM:  Vital Signs Last 24 Hrs  T(C): 36.7 (24 Jan 2020 09:38), Max: 36.7 (24 Jan 2020 09:38)  T(F): 98 (24 Jan 2020 09:38), Max: 98 (24 Jan 2020 09:38)  HR: 60 (24 Jan 2020 09:38) (60 - 66)  BP: 116/59 (24 Jan 2020 09:38) (111/58 - 145/75)  BP(mean): 83 (24 Jan 2020 09:38) (83 - 83)  RR: 17 (24 Jan 2020 09:38) (17 - 18)  SpO2: 98% (24 Jan 2020 09:38) (93% - 100%)  Face: orbital ecchymosis/soft tissue swelling (improved), hard of hearing  Heent: no adenopathy  RESPIRATORY: Normal respiratory effort; lungs are clear to auscultation bilaterally  CARDIOVASCULAR: Regular rate and rhythm, normal S1 and S2, no murmur/rub/gallop; No lower extremity edema; Peripheral pulses are 2+ bilaterally  ABDOMEN: Nontender to palpation, normoactive bowel sounds, no rebound/guarding; No hepatosplenomegaly  MUSCULOSKELETAL: no clubbing or cyanosis of digits; no joint swelling or tenderness to palpation  PSYCH: A+O to person, place, and time; affect appropriate    LABS:                        11.4   9.03  )-----------( 360      ( 23 Jan 2020 06:00 )             34.7     01-23    132<L>  |  101  |  13  ----------------------------<  121<H>  5.4<H>   |  24  |  0.77    Ca    9.4      23 Jan 2020 06:00  Mg     1.6     01-23            RADIOLOGY & ADDITIONAL TESTS:  Results Reviewed:   Imaging Personally Reviewed:  Electrocardiogram Personally Reviewed:    COORDINATION OF CARE:  Care Discussed with Consultants/Other Providers [Y/N]: ronda Wilkinson d/c to rehab when bed available  Prior or Outpatient Records Reviewed [Y/N]:
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
## 2020-01-26 NOTE — PROVIDER CONTACT NOTE (OTHER) - SITUATION
Pt orthostatic BP laying 185/89 and HR 67, ortho BP sitting up 195/92 and HR 65. Do Not Use Extremity;

## 2020-11-03 NOTE — PATIENT PROFILE ADULT - FUNCTIONAL SCREEN CURRENT LEVEL: SWALLOWING (IF SCORE 2 OR MORE FOR ANY ITEM, CONSULT REHAB SERVICES), MLM)
Applied Unable to assess. Pt is confused 0 = swallows foods/liquids without difficulty/Unable to assess. Pt is confused

## 2020-12-11 NOTE — PROGRESS NOTE ADULT - PROBLEM/PLAN-7
Erythromycin Counseling:  I discussed with the patient the risks of erythromycin including but not limited to GI upset, allergic reaction, drug rash, diarrhea, increase in liver enzymes, and yeast infections. Azithromycin Pregnancy And Lactation Text: This medication is considered safe during pregnancy and is also secreted in breast milk. Tazorac Counseling:  Patient advised that medication is irritating and drying.  Patient may need to apply sparingly and wash off after an hour before eventually leaving it on overnight.  The patient verbalized understanding of the proper use and possible adverse effects of tazorac.  All of the patient's questions and concerns were addressed. Sarecycline Pregnancy And Lactation Text: This medication is Pregnancy Category D and not consider safe during pregnancy. It is also excreted in breast milk. High Dose Vitamin A Pregnancy And Lactation Text: High dose vitamin A therapy is contraindicated during pregnancy and breast feeding. Spironolactone Counseling: Patient advised regarding risks of diarrhea, abdominal pain, hyperkalemia, birth defects (for female patients), liver toxicity and renal toxicity. The patient may need blood work to monitor liver and kidney function and potassium levels while on therapy. The patient verbalized understanding of the proper use and possible adverse effects of spironolactone.  All of the patient's questions and concerns were addressed. Tazorac Pregnancy And Lactation Text: This medication is not safe during pregnancy. It is unknown if this medication is excreted in breast milk. Topical Sulfur Applications Pregnancy And Lactation Text: This medication is Pregnancy Category C and has an unknown safety profile during pregnancy. It is unknown if this topical medication is excreted in breast milk. Benzoyl Peroxide Counseling: Patient counseled that medicine may cause skin irritation and bleach clothing.  In the event of skin irritation, the patient was advised to reduce the amount of the drug applied or use it less frequently.   The patient verbalized understanding of the proper use and possible adverse effects of benzoyl peroxide.  All of the patient's questions and concerns were addressed. Bactrim Counseling:  I discussed with the patient the risks of sulfa antibiotics including but not limited to GI upset, allergic reaction, drug rash, diarrhea, dizziness, photosensitivity, and yeast infections.  Rarely, more serious reactions can occur including but not limited to aplastic anemia, agranulocytosis, methemoglobinemia, blood dyscrasias, liver or kidney failure, lung infiltrates or desquamative/blistering drug rashes. Dapsone Counseling: I discussed with the patient the risks of dapsone including but not limited to hemolytic anemia, agranulocytosis, rashes, methemoglobinemia, kidney failure, peripheral neuropathy, headaches, GI upset, and liver toxicity.  Patients who start dapsone require monitoring including baseline LFTs and weekly CBCs for the first month, then every month thereafter.  The patient verbalized understanding of the proper use and possible adverse effects of dapsone.  All of the patient's questions and concerns were addressed. Erythromycin Pregnancy And Lactation Text: This medication is Pregnancy Category B and is considered safe during pregnancy. It is also excreted in breast milk. Dapsone Pregnancy And Lactation Text: This medication is Pregnancy Category C and is not considered safe during pregnancy or breast feeding. Spironolactone Pregnancy And Lactation Text: This medication can cause feminization of the male fetus and should be avoided during pregnancy. The active metabolite is also found in breast milk. Isotretinoin Counseling: Patient should get monthly blood tests, not donate blood, not drive at night if vision affected, not share medication, and not undergo elective surgery for 6 months after tx completed. Side effects reviewed, pt to contact office should one occur. DISPLAY PLAN FREE TEXT Topical Clindamycin Counseling: Patient counseled that this medication may cause skin irritation or allergic reactions.  In the event of skin irritation, the patient was advised to reduce the amount of the drug applied or use it less frequently.   The patient verbalized understanding of the proper use and possible adverse effects of clindamycin.  All of the patient's questions and concerns were addressed. Benzoyl Peroxide Pregnancy And Lactation Text: This medication is Pregnancy Category C. It is unknown if benzoyl peroxide is excreted in breast milk. Minocycline Counseling: Patient advised regarding possible photosensitivity and discoloration of the teeth, skin, lips, tongue and gums.  Patient instructed to avoid sunlight, if possible.  When exposed to sunlight, patients should wear protective clothing, sunglasses, and sunscreen.  The patient was instructed to call the office immediately if the following severe adverse effects occur:  hearing changes, easy bruising/bleeding, severe headache, or vision changes.  The patient verbalized understanding of the proper use and possible adverse effects of minocycline.  All of the patient's questions and concerns were addressed. Topical Retinoid counseling:  Patient advised to apply a pea-sized amount only at bedtime and wait 30 minutes after washing their face before applying.  If too drying, patient may add a non-comedogenic moisturizer. The patient verbalized understanding of the proper use and possible adverse effects of retinoids.  All of the patient's questions and concerns were addressed. Topical Clindamycin Pregnancy And Lactation Text: This medication is Pregnancy Category B and is considered safe during pregnancy. It is unknown if it is excreted in breast milk. Use Enhanced Medication Counseling?: No Bactrim Pregnancy And Lactation Text: This medication is Pregnancy Category D and is known to cause fetal risk.  It is also excreted in breast milk. Isotretinoin Pregnancy And Lactation Text: This medication is Pregnancy Category X and is considered extremely dangerous during pregnancy. It is unknown if it is excreted in breast milk. Birth Control Pills Counseling: Birth Control Pill Counseling: I discussed with the patient the potential side effects of OCPs including but not limited to increased risk of stroke, heart attack, thrombophlebitis, deep venous thrombosis, hepatic adenomas, breast changes, GI upset, headaches, and depression.  The patient verbalized understanding of the proper use and possible adverse effects of OCPs. All of the patient's questions and concerns were addressed. Doxycycline Counseling:  Patient counseled regarding possible photosensitivity and increased risk for sunburn.  Patient instructed to avoid sunlight, if possible.  When exposed to sunlight, patients should wear protective clothing, sunglasses, and sunscreen.  The patient was instructed to call the office immediately if the following severe adverse effects occur:  hearing changes, easy bruising/bleeding, severe headache, or vision changes.  The patient verbalized understanding of the proper use and possible adverse effects of doxycycline.  All of the patient's questions and concerns were addressed. Tetracycline Counseling: Patient counseled regarding possible photosensitivity and increased risk for sunburn.  Patient instructed to avoid sunlight, if possible.  When exposed to sunlight, patients should wear protective clothing, sunglasses, and sunscreen.  The patient was instructed to call the office immediately if the following severe adverse effects occur:  hearing changes, easy bruising/bleeding, severe headache, or vision changes.  The patient verbalized understanding of the proper use and possible adverse effects of tetracycline.  All of the patient's questions and concerns were addressed. Patient understands to avoid pregnancy while on therapy due to potential birth defects. Sarecycline Counseling: Patient advised regarding possible photosensitivity and discoloration of the teeth, skin, lips, tongue and gums.  Patient instructed to avoid sunlight, if possible.  When exposed to sunlight, patients should wear protective clothing, sunglasses, and sunscreen.  The patient was instructed to call the office immediately if the following severe adverse effects occur:  hearing changes, easy bruising/bleeding, severe headache, or vision changes.  The patient verbalized understanding of the proper use and possible adverse effects of sarecycline.  All of the patient's questions and concerns were addressed. Detail Level: Detailed Doxycycline Pregnancy And Lactation Text: This medication is Pregnancy Category D and not consider safe during pregnancy. It is also excreted in breast milk but is considered safe for shorter treatment courses. Topical Retinoid Pregnancy And Lactation Text: This medication is Pregnancy Category C. It is unknown if this medication is excreted in breast milk. Birth Control Pills Pregnancy And Lactation Text: This medication should be avoided if pregnant and for the first 30 days post-partum. Topical Sulfur Applications Counseling: Topical Sulfur Counseling: Patient counseled that this medication may cause skin irritation or allergic reactions.  In the event of skin irritation, the patient was advised to reduce the amount of the drug applied or use it less frequently.   The patient verbalized understanding of the proper use and possible adverse effects of topical sulfur application.  All of the patient's questions and concerns were addressed. Azithromycin Counseling:  I discussed with the patient the risks of azithromycin including but not limited to GI upset, allergic reaction, drug rash, diarrhea, and yeast infections. High Dose Vitamin A Counseling: Side effects reviewed, pt to contact office should one occur.

## 2021-03-10 NOTE — PATIENT PROFILE ADULT - NSPROPTRIGHTNOTIFY_GEN_A_NUR
Acute Care Visit  Jd Lockett MD      SUBJECTIVE:     Stefano Rocha is a 11 year old female presenting for   Chief Complaint   Patient presents with   • Office Visit     toenail removal     - since last procedure, R remaining toenail getting more painful, L toenail looking infected, painful, draining blood  - here to have it removed    Review of Systems      PAST MEDICAL HISTORY:  Past Medical History:   Diagnosis Date   • Cerumen impaction 3/12/2016   • Eczema 8/17/2014   • Plantar warts 1/5/2015       MEDICATIONS:  Current Outpatient Medications   Medication Sig   • Cholecalciferol (vitamin D) 50 mcg (2,000 units) capsule Take 1 capsule by mouth daily.     No current facility-administered medications for this visit.        ALLERGIES:  ALLERGIES:  No Known Allergies    PAST SURGICAL HISTORY:  Past Surgical History:   Procedure Laterality Date   • No past surgeries         FAMILY HISTORY:  Family History   Problem Relation Age of Onset   • Diabetes Mother    • Diabetes Paternal Grandmother    • Hypertension Neg Hx    • Cancer Neg Hx    • Heart disease Neg Hx    • Asthma Neg Hx        SOCIAL HISTORY:  Social History     Tobacco Use   • Smoking status: Never Smoker   • Smokeless tobacco: Never Used   Substance Use Topics   • Alcohol use: Not on file   • Drug use: Not on file       OBJECTIVE  Vitals:    03/10/21 1330   BP: (!) 136/76   BP Location: LUE - Left upper extremity   Patient Position: Sitting   Cuff Size: Regular   Pulse: 108   Temp: 97.9 °F (36.6 °C)   TempSrc: Temporal   Weight: (!) 62.1 kg (137 lb)   Height: 5' 1\" (1.549 m)       Physical Exam  HENT:      Right Ear: External ear normal.      Left Ear: External ear normal.   Eyes:      Conjunctiva/sclera: Conjunctivae normal.   Pulmonary:      Effort: Pulmonary effort is normal. No respiratory distress.   Musculoskeletal:      Comments: R Hallux medial ingrown toenail, see picture  L Hallux lateral ingrown toenail, bloody drainage, swelling, erythema,  see picture   Neurological:      Mental Status: She is alert.           R Hallux            L Hallux      Nail Removal    Date/Time: 3/10/2021 3:35 PM  Performed by: Jd Lockett MD  Authorized by: Jd Lockett MD   Anesthesia: digital block    Anesthesia:  Local Anesthetic: lidocaine 1% with epinephrine  Anesthetic total: 15 mL  Preparation: skin prepped with alcohol  Comments: R hallux cleaned and prepped w/alcohol. Digital block performed with lidocaine w/1% epi, 10ml. Good anesthesia achieved. Complete toenail removed. Hemostasis achieved with direct pressure. Bacitracin applied and wrapped w/bandaid. Tolerated procedure well.     L hallux cleaned and prepped w/alcohol. Digital block performed with lidocaine w/1% epi, 5 ml. Good anesthesia achieved. Lateral half of toenail removed. Hemostasis achieved with direct pressure. Bacitracin applied and wrapped w/bandaid. Tolerated procedure well.         ASSESSMENT/PLAN  Stefano Rocha is a 11 year old female presenting for   Chief Complaint   Patient presents with   • Office Visit     toenail removal     Problem List Items Addressed This Visit        Integumentary    Ingrown right big toenail - Primary     S/p Lateral R Hallux Toenail removal 2/10  Complete Toenail removal performed today, see procedure note  Home Care/Preventative care discussed         Ingrown left big toenail     L Hallux Lateral Toenail removal performed today, see procedure note  Home Care/Preventative care discussed               Follow Up: Return if symptoms worsen or fail to improve.    Jd Lockett MD  [facility]  [facility] [place] [address]  [phone]         declines

## 2021-10-06 NOTE — PROGRESS NOTE ADULT - PROBLEM/PLAN-4
DISPLAY PLAN FREE TEXT Airway patent, Nasal mucosa clear. Mouth with normal mucosa. Throat has no vesicles, no oropharyngeal exudates and uvula is midline. Multiple dental caries + ttp to #1

## 2021-10-12 NOTE — PATIENT PROFILE ADULT - NSPROGENOTHERPROVIDER_GEN_A_NUR
Final Anesthesia Post-op Assessment    Patient: Izabela Washington  Procedure(s) Performed: EXTRACTION, CATARACT, WITH IOL INSERTION/LEFT EYE - LEFT  Anesthesia type: MAC    Vitals Value Taken Time   Temp 36.6 °C (97.8 °F) 10/12/21 0919   Pulse 72 10/12/21 0919   Resp 20 10/12/21 0919   SpO2 95 % 10/12/21 0919   /68 10/12/21 0919         Patient Location: PACU Phase 2  Post-op Vital Signs:stable  Level of Consciousness: participates in exam, awake, oriented, alert and answers questions appropriately  Respiratory Status: spontaneous ventilation  Cardiovascular blood pressure returned to baseline  Hydration: euvolemic  Pain Management: well controlled  Nausea: None  Airway Patency:patent  Post-op Assessment: awake, alert, appropriately conversant, or baseline, no complications and patient tolerated procedure well with no complications  Comments: Patient meets d/c home criteria.  Patient is responsive and appropriate.      No complications documented.   
none

## 2022-05-03 NOTE — PROGRESS NOTE ADULT - ATTENDING COMMENTS
dispo: d/c to facility when bed available, medically optimized for discharge Nsaids Counseling: NSAID Counseling: I discussed with the patient that NSAIDs should be taken with food. Prolonged use of NSAIDs can result in the development of stomach ulcers.  Patient advised to stop taking NSAIDs if abdominal pain occurs.  The patient verbalized understanding of the proper use and possible adverse effects of NSAIDs.  All of the patient's questions and concerns were addressed.

## 2022-06-03 NOTE — PATIENT PROFILE ADULT - NSPROGENBLOODRESTRICT_GEN_A_NUR
Patient Education     4 Steps for Eating Healthier  Changing the way you eat can improve your health. It can lower your cholesterol and blood pressure, and help you stay at a healthy weight. Your diet doesn’t have to be bland and boring to be healthy. Just watch your calories and follow these steps:    Step 1. Eat fewer unhealthy fats  · Choose more fish and lean meats instead of fatty cuts of meat.  · Skip butter and lard, and use less margarine.  · Pass on foods that have palm, coconut, or hydrogenated oils.  · Eat fewer high-fat dairy foods like cheese, ice cream, and whole milk.  · Get a heart-healthy cookbook and try some low-fat recipes.  Step 2. Go light on salt  · Keep the saltshaker off the table.  · Limit high-salt ingredients, such as soy sauce, bouillon, and garlic salt.  · Instead of adding salt when cooking, season your food with herbs and flavorings. Try lemon, garlic, and onion, or salt-free herb seasonings.  · Limit convenience foods, such as boxed or canned foods and restaurant food.  · Read food labels and choose lower-sodium options.  Step 3. Limit sugar  · Pause before you add sugars to pancakes, cereal, coffee, or tea. This includes white and brown table sugar, syrup, honey, and molasses. Cut your usual amount by half.  · Use non-sugar sweeteners. Stevia, aspartame, and sucralose can satisfy a sweet tooth without adding calories.  · Swap out sugar-filled soda and other drinks. Buy sugar-free or low-calorie beverages. Remember water is always the best choice.  · Read labels and choose foods with less added sugar. Keep in mind that dairy foods and foods with fruit will have some natural sugar.  · Cut the sugar in recipes by 1/3 to 1/2. Boost the flavor with extracts like almond, vanilla, or orange. Or add spices such as cinnamon or nutmeg.  Step 4. Eat more fiber  · Eat fresh fruits and vegetables every day.  · Boost your diet with whole grains. Go for oats, whole-grain rice, and bran.  · Add  beans and lentils to your meals.  · Drink more water to match your fiber increase to help prevent constipation.  Date Last Reviewed: 6/1/2017 © 2000-2018 [employer], [place]. [address] [facility], [place] [address]. All rights reserved. This information is not intended as a substitute for professional medical care. Always follow your healthcare professional's instructions.         Patient Education     Exercise for a Healthier Heart     Exercise with a friend. When activity is fun, you're more likely to stick with it.   You may wonder how you can improve the health of your heart. If you’re thinking about exercise, you’re on the right track. You don’t need to become an athlete. But you do need a certain amount of brisk exercise to help strengthen your heart. If you have been diagnosed with a heart condition, your healthcare provider may advise exercise to help stabilize your condition. To help make exercise a habit, choose safe, fun activities.   Before you start  Check with your healthcare provider before starting an exercise program. This is especially important if you have not been active for a while. It's also important if you have a long-term (chronic) health problem such as heart disease, diabetes, or obesity. Or if you are at high risk for having these problems.   Why exercise?  Exercising regularly offers many healthy rewards. It can help you do all of the following:  · Improve your blood cholesterol level to help prevent further heart trouble  · Lower your blood pressure to help prevent a stroke or heart attack  · Control diabetes, or reduce your risk of getting this disease  · Improve your heart and lung function  · Reach and stay at a healthy weight  · Make your muscles stronger so you can stay active  · Prevent falls and fractures by slowing the loss of bone mass (osteoporosis)  · Manage stress better  · Reduce your blood pressure  · Improve your sense of self and your body image  Exercise  tips    · Ease into your routine. Set small goals. Then build on them. If you are not sure what your activity level should be, talk with your healthcare provider first before starting an exercise routine.  · Exercise on most days. Aim for a total of 150 minutes (2 hours and 30 minutes) or more of moderate-intensity aerobic activity each week. Or 75 minutes (1 hour and 15 minutes) or more of vigorous-intensity aerobic activity each week. Or try for a combination of both. Moderate activity means that you breathe heavier and your heart rate increases but you can still talk. Think about doing 40 minutes of moderate exercise, 3 to 4 times a week. For best results, activity should last for about 40 minutes to lower blood pressure and cholesterol. It's OK to work up to the 40-minute period over time. Examples of moderate-intensity activity are walking 1 mile in 15 minutes. Or doing 30 to 45 minutes of yard work.  · Step up your daily activity level.  Along with your exercise program, try being more active the whole day. Walk instead of drive. Or park further away so that you take more steps each day. Do more household tasks or yard work. You may not be able to meet the advised mount of physical activity. But doing some moderate- or vigorous-intensity aerobic activity can help reduce your risk for heart disease. Your healthcare provider can help you figure out what is best for you.  · Choose 1 or more activities you enjoy.  Walking is one of the easiest things you can do. You can also try swimming, riding a bike, dancing, or taking an exercise class.    When to call your healthcare provider  Call your healthcare provider if you have any of these:   · Chest pain or feel dizzy or lightheaded  · Burning, tightness, pressure, or heaviness in your chest, neck, shoulders, back, or arms  · Abnormal shortness of breath  · More joint or muscle pain  · A very fast or irregular heartbeat (palpitations)  Nydia last reviewed this  educational content on 7/1/2019  © 7262-5422 [employer], [place]. [address] [place] [address], [place] [address]. All rights reserved. This information is not intended as a substitute for professional medical care. Always follow your healthcare professional's instructions.            Unable to assess. Pt is confused none/Unable to assess. Pt is confused

## 2022-06-08 NOTE — ED ADULT NURSE NOTE - NSFALLRSKPASTHIST_ED_ALL_ED
Medication: Pending Prescriptions:                       Disp   Refills    traMADol (ULTRAM) 50 MG tablet            28 tab*0            Sig: Take 1 tablet (50 mg) by mouth every 12 hours as           needed for severe pain          CSA in last year: NO    Random Utox in last year: NO  (if any of the above answer NO - schedule with PCP)     On opioids in addition to benzodiazepines? NO  (if the above answer YES - schedule with PCP every 6 months)           PROVIDER TO PULL THE FOLLOWING FROM  :    1. Last date filled?  2.   Only PCP Prescribing?  3.   Taken as prescribed from physician notes?            unable to determine

## 2023-01-01 ENCOUNTER — INPATIENT (INPATIENT)
Facility: HOSPITAL | Age: 78
LOS: 4 days | Discharge: NURSING, REHAB OR LONG-TERM CARE | End: 2023-10-12
Attending: STUDENT IN AN ORGANIZED HEALTH CARE EDUCATION/TRAINING PROGRAM | Admitting: STUDENT IN AN ORGANIZED HEALTH CARE EDUCATION/TRAINING PROGRAM
Payer: MEDICAID

## 2023-01-01 ENCOUNTER — TRANSCRIPTION ENCOUNTER (OUTPATIENT)
Age: 78
End: 2023-01-01

## 2023-01-01 VITALS
OXYGEN SATURATION: 98 % | RESPIRATION RATE: 22 BRPM | TEMPERATURE: 95 F | HEART RATE: 58 BPM | DIASTOLIC BLOOD PRESSURE: 42 MMHG | SYSTOLIC BLOOD PRESSURE: 99 MMHG

## 2023-01-01 VITALS
SYSTOLIC BLOOD PRESSURE: 117 MMHG | OXYGEN SATURATION: 96 % | HEART RATE: 98 BPM | DIASTOLIC BLOOD PRESSURE: 64 MMHG | TEMPERATURE: 99 F | RESPIRATION RATE: 18 BRPM

## 2023-01-01 DIAGNOSIS — Z29.9 ENCOUNTER FOR PROPHYLACTIC MEASURES, UNSPECIFIED: ICD-10-CM

## 2023-01-01 DIAGNOSIS — A41.9 SEPSIS, UNSPECIFIED ORGANISM: ICD-10-CM

## 2023-01-01 DIAGNOSIS — Z86.73 PERSONAL HISTORY OF TRANSIENT ISCHEMIC ATTACK (TIA), AND CEREBRAL INFARCTION WITHOUT RESIDUAL DEFICITS: ICD-10-CM

## 2023-01-01 DIAGNOSIS — G93.41 METABOLIC ENCEPHALOPATHY: ICD-10-CM

## 2023-01-01 DIAGNOSIS — I10 ESSENTIAL (PRIMARY) HYPERTENSION: ICD-10-CM

## 2023-01-01 DIAGNOSIS — N39.0 URINARY TRACT INFECTION, SITE NOT SPECIFIED: ICD-10-CM

## 2023-01-01 DIAGNOSIS — E87.1 HYPO-OSMOLALITY AND HYPONATREMIA: ICD-10-CM

## 2023-01-01 DIAGNOSIS — E11.9 TYPE 2 DIABETES MELLITUS WITHOUT COMPLICATIONS: ICD-10-CM

## 2023-01-01 DIAGNOSIS — I25.10 ATHEROSCLEROTIC HEART DISEASE OF NATIVE CORONARY ARTERY WITHOUT ANGINA PECTORIS: ICD-10-CM

## 2023-01-01 DIAGNOSIS — Z95.1 PRESENCE OF AORTOCORONARY BYPASS GRAFT: Chronic | ICD-10-CM

## 2023-01-01 LAB
-  AMIKACIN: SIGNIFICANT CHANGE UP
-  AMOXICILLIN/CLAVULANIC ACID: SIGNIFICANT CHANGE UP
-  AMPICILLIN/SULBACTAM: SIGNIFICANT CHANGE UP
-  AMPICILLIN/SULBACTAM: SIGNIFICANT CHANGE UP
-  AMPICILLIN: SIGNIFICANT CHANGE UP
-  AZTREONAM: SIGNIFICANT CHANGE UP
-  CEFAZOLIN: SIGNIFICANT CHANGE UP
-  CEFAZOLIN: SIGNIFICANT CHANGE UP
-  CEFEPIME: SIGNIFICANT CHANGE UP
-  CEFOXITIN: SIGNIFICANT CHANGE UP
-  CEFTRIAXONE: SIGNIFICANT CHANGE UP
-  CIPROFLOXACIN: SIGNIFICANT CHANGE UP
-  CLINDAMYCIN: SIGNIFICANT CHANGE UP
-  COAGULASE NEGATIVE STAPHYLOCOCCUS: SIGNIFICANT CHANGE UP
-  ERTAPENEM: SIGNIFICANT CHANGE UP
-  ERYTHROMYCIN: SIGNIFICANT CHANGE UP
-  GENTAMICIN: SIGNIFICANT CHANGE UP
-  LEVOFLOXACIN: SIGNIFICANT CHANGE UP
-  MEROPENEM: SIGNIFICANT CHANGE UP
-  NITROFURANTOIN: SIGNIFICANT CHANGE UP
-  OXACILLIN: SIGNIFICANT CHANGE UP
-  PENICILLIN: SIGNIFICANT CHANGE UP
-  PIPERACILLIN/TAZOBACTAM: SIGNIFICANT CHANGE UP
-  RIFAMPIN: SIGNIFICANT CHANGE UP
-  TETRACYCLINE: SIGNIFICANT CHANGE UP
-  TRIMETHOPRIM/SULFAMETHOXAZOLE: SIGNIFICANT CHANGE UP
-  TRIMETHOPRIM/SULFAMETHOXAZOLE: SIGNIFICANT CHANGE UP
-  VANCOMYCIN: SIGNIFICANT CHANGE UP
A1C WITH ESTIMATED AVERAGE GLUCOSE RESULT: 6.5 % — HIGH (ref 4–5.6)
ALBUMIN SERPL ELPH-MCNC: 3.7 G/DL — SIGNIFICANT CHANGE UP (ref 3.3–5)
ALBUMIN SERPL ELPH-MCNC: 3.8 G/DL — SIGNIFICANT CHANGE UP (ref 3.3–5)
ALP SERPL-CCNC: 76 U/L — SIGNIFICANT CHANGE UP (ref 40–120)
ALP SERPL-CCNC: 76 U/L — SIGNIFICANT CHANGE UP (ref 40–120)
ALP SERPL-CCNC: 79 U/L — SIGNIFICANT CHANGE UP (ref 40–120)
ALP SERPL-CCNC: 81 U/L — SIGNIFICANT CHANGE UP (ref 40–120)
ALT FLD-CCNC: 11 U/L — SIGNIFICANT CHANGE UP (ref 4–41)
ALT FLD-CCNC: 12 U/L — SIGNIFICANT CHANGE UP (ref 4–41)
ALT FLD-CCNC: 12 U/L — SIGNIFICANT CHANGE UP (ref 4–41)
ALT FLD-CCNC: 14 U/L — SIGNIFICANT CHANGE UP (ref 4–41)
ANION GAP SERPL CALC-SCNC: 14 MMOL/L — SIGNIFICANT CHANGE UP (ref 7–14)
ANION GAP SERPL CALC-SCNC: 14 MMOL/L — SIGNIFICANT CHANGE UP (ref 7–14)
ANION GAP SERPL CALC-SCNC: 15 MMOL/L — HIGH (ref 7–14)
ANION GAP SERPL CALC-SCNC: 18 MMOL/L — HIGH (ref 7–14)
ANION GAP SERPL CALC-SCNC: 9 MMOL/L — SIGNIFICANT CHANGE UP (ref 7–14)
APPEARANCE UR: ABNORMAL
APTT BLD: 26 SEC — SIGNIFICANT CHANGE UP (ref 24.5–35.6)
AST SERPL-CCNC: 12 U/L — SIGNIFICANT CHANGE UP (ref 4–40)
AST SERPL-CCNC: 12 U/L — SIGNIFICANT CHANGE UP (ref 4–40)
AST SERPL-CCNC: 13 U/L — SIGNIFICANT CHANGE UP (ref 4–40)
AST SERPL-CCNC: 22 U/L — SIGNIFICANT CHANGE UP (ref 4–40)
B PERT DNA SPEC QL NAA+PROBE: SIGNIFICANT CHANGE UP
B PERT+PARAPERT DNA PNL SPEC NAA+PROBE: SIGNIFICANT CHANGE UP
BASE EXCESS BLDV CALC-SCNC: -0.8 MMOL/L — SIGNIFICANT CHANGE UP (ref -2–3)
BASE EXCESS BLDV CALC-SCNC: -2.8 MMOL/L — LOW (ref -2–3)
BASOPHILS # BLD AUTO: 0.05 K/UL — SIGNIFICANT CHANGE UP (ref 0–0.2)
BASOPHILS # BLD AUTO: 0.05 K/UL — SIGNIFICANT CHANGE UP (ref 0–0.2)
BASOPHILS NFR BLD AUTO: 0.3 % — SIGNIFICANT CHANGE UP (ref 0–2)
BASOPHILS NFR BLD AUTO: 0.4 % — SIGNIFICANT CHANGE UP (ref 0–2)
BILIRUB SERPL-MCNC: 0.5 MG/DL — SIGNIFICANT CHANGE UP (ref 0.2–1.2)
BILIRUB SERPL-MCNC: 0.6 MG/DL — SIGNIFICANT CHANGE UP (ref 0.2–1.2)
BILIRUB UR-MCNC: NEGATIVE — SIGNIFICANT CHANGE UP
BLOOD GAS VENOUS COMPREHENSIVE RESULT: SIGNIFICANT CHANGE UP
BLOOD GAS VENOUS COMPREHENSIVE RESULT: SIGNIFICANT CHANGE UP
BORDETELLA PARAPERTUSSIS (RAPRVP): SIGNIFICANT CHANGE UP
BUN SERPL-MCNC: 14 MG/DL — SIGNIFICANT CHANGE UP (ref 7–23)
BUN SERPL-MCNC: 20 MG/DL — SIGNIFICANT CHANGE UP (ref 7–23)
BUN SERPL-MCNC: 22 MG/DL — SIGNIFICANT CHANGE UP (ref 7–23)
BUN SERPL-MCNC: 22 MG/DL — SIGNIFICANT CHANGE UP (ref 7–23)
BUN SERPL-MCNC: 8 MG/DL — SIGNIFICANT CHANGE UP (ref 7–23)
C PNEUM DNA SPEC QL NAA+PROBE: SIGNIFICANT CHANGE UP
CALCIUM SERPL-MCNC: 8.5 MG/DL — SIGNIFICANT CHANGE UP (ref 8.4–10.5)
CALCIUM SERPL-MCNC: 8.8 MG/DL — SIGNIFICANT CHANGE UP (ref 8.4–10.5)
CALCIUM SERPL-MCNC: 8.8 MG/DL — SIGNIFICANT CHANGE UP (ref 8.4–10.5)
CALCIUM SERPL-MCNC: 8.9 MG/DL — SIGNIFICANT CHANGE UP (ref 8.4–10.5)
CALCIUM SERPL-MCNC: 9.3 MG/DL — SIGNIFICANT CHANGE UP (ref 8.4–10.5)
CHLORIDE BLDV-SCNC: 103 MMOL/L — SIGNIFICANT CHANGE UP (ref 96–108)
CHLORIDE SERPL-SCNC: 91 MMOL/L — LOW (ref 98–107)
CHLORIDE SERPL-SCNC: 91 MMOL/L — LOW (ref 98–107)
CHLORIDE SERPL-SCNC: 94 MMOL/L — LOW (ref 98–107)
CHLORIDE SERPL-SCNC: 98 MMOL/L — SIGNIFICANT CHANGE UP (ref 98–107)
CHLORIDE SERPL-SCNC: 99 MMOL/L — SIGNIFICANT CHANGE UP (ref 98–107)
CHOLEST SERPL-MCNC: 147 MG/DL — SIGNIFICANT CHANGE UP
CO2 BLDV-SCNC: 25.6 MMOL/L — SIGNIFICANT CHANGE UP (ref 22–26)
CO2 BLDV-SCNC: 26.8 MMOL/L — HIGH (ref 22–26)
CO2 SERPL-SCNC: 20 MMOL/L — LOW (ref 22–31)
CO2 SERPL-SCNC: 21 MMOL/L — LOW (ref 22–31)
CO2 SERPL-SCNC: 25 MMOL/L — SIGNIFICANT CHANGE UP (ref 22–31)
COLOR SPEC: YELLOW — SIGNIFICANT CHANGE UP
CREAT ?TM UR-MCNC: 60 MG/DL — SIGNIFICANT CHANGE UP
CREAT SERPL-MCNC: 0.96 MG/DL — SIGNIFICANT CHANGE UP (ref 0.5–1.3)
CREAT SERPL-MCNC: 1.04 MG/DL — SIGNIFICANT CHANGE UP (ref 0.5–1.3)
CREAT SERPL-MCNC: 1.04 MG/DL — SIGNIFICANT CHANGE UP (ref 0.5–1.3)
CREAT SERPL-MCNC: 1.09 MG/DL — SIGNIFICANT CHANGE UP (ref 0.5–1.3)
CREAT SERPL-MCNC: 1.11 MG/DL — SIGNIFICANT CHANGE UP (ref 0.5–1.3)
CULTURE RESULTS: SIGNIFICANT CHANGE UP
DIFF PNL FLD: ABNORMAL
EGFR: 68 ML/MIN/1.73M2 — SIGNIFICANT CHANGE UP
EGFR: 69 ML/MIN/1.73M2 — SIGNIFICANT CHANGE UP
EGFR: 74 ML/MIN/1.73M2 — SIGNIFICANT CHANGE UP
EGFR: 74 ML/MIN/1.73M2 — SIGNIFICANT CHANGE UP
EGFR: 81 ML/MIN/1.73M2 — SIGNIFICANT CHANGE UP
EOSINOPHIL # BLD AUTO: 0.27 K/UL — SIGNIFICANT CHANGE UP (ref 0–0.5)
EOSINOPHIL # BLD AUTO: 0.4 K/UL — SIGNIFICANT CHANGE UP (ref 0–0.5)
EOSINOPHIL NFR BLD AUTO: 1.8 % — SIGNIFICANT CHANGE UP (ref 0–6)
EOSINOPHIL NFR BLD AUTO: 3.4 % — SIGNIFICANT CHANGE UP (ref 0–6)
ESTIMATED AVERAGE GLUCOSE: 140 — SIGNIFICANT CHANGE UP
FERRITIN SERPL-MCNC: 125 NG/ML — SIGNIFICANT CHANGE UP (ref 30–400)
FLUAV SUBTYP SPEC NAA+PROBE: SIGNIFICANT CHANGE UP
FLUBV RNA SPEC QL NAA+PROBE: SIGNIFICANT CHANGE UP
GAS PNL BLDV: 131 MMOL/L — LOW (ref 136–145)
GAS PNL BLDV: SIGNIFICANT CHANGE UP
GAS PNL BLDV: SIGNIFICANT CHANGE UP
GLUCOSE BLDC GLUCOMTR-MCNC: 112 MG/DL — HIGH (ref 70–99)
GLUCOSE BLDC GLUCOMTR-MCNC: 115 MG/DL — HIGH (ref 70–99)
GLUCOSE BLDC GLUCOMTR-MCNC: 136 MG/DL — HIGH (ref 70–99)
GLUCOSE BLDC GLUCOMTR-MCNC: 140 MG/DL — HIGH (ref 70–99)
GLUCOSE BLDC GLUCOMTR-MCNC: 143 MG/DL — HIGH (ref 70–99)
GLUCOSE BLDC GLUCOMTR-MCNC: 143 MG/DL — HIGH (ref 70–99)
GLUCOSE BLDC GLUCOMTR-MCNC: 147 MG/DL — HIGH (ref 70–99)
GLUCOSE BLDC GLUCOMTR-MCNC: 156 MG/DL — HIGH (ref 70–99)
GLUCOSE BLDC GLUCOMTR-MCNC: 158 MG/DL — HIGH (ref 70–99)
GLUCOSE BLDC GLUCOMTR-MCNC: 163 MG/DL — HIGH (ref 70–99)
GLUCOSE BLDC GLUCOMTR-MCNC: 163 MG/DL — HIGH (ref 70–99)
GLUCOSE BLDC GLUCOMTR-MCNC: 169 MG/DL — HIGH (ref 70–99)
GLUCOSE BLDC GLUCOMTR-MCNC: 189 MG/DL — HIGH (ref 70–99)
GLUCOSE BLDC GLUCOMTR-MCNC: 191 MG/DL — HIGH (ref 70–99)
GLUCOSE BLDC GLUCOMTR-MCNC: 193 MG/DL — HIGH (ref 70–99)
GLUCOSE BLDC GLUCOMTR-MCNC: 196 MG/DL — HIGH (ref 70–99)
GLUCOSE BLDC GLUCOMTR-MCNC: 81 MG/DL — SIGNIFICANT CHANGE UP (ref 70–99)
GLUCOSE BLDV-MCNC: 147 MG/DL — HIGH (ref 70–99)
GLUCOSE SERPL-MCNC: 136 MG/DL — HIGH (ref 70–99)
GLUCOSE SERPL-MCNC: 165 MG/DL — HIGH (ref 70–99)
GLUCOSE SERPL-MCNC: 182 MG/DL — HIGH (ref 70–99)
GLUCOSE SERPL-MCNC: 222 MG/DL — HIGH (ref 70–99)
GLUCOSE SERPL-MCNC: 235 MG/DL — HIGH (ref 70–99)
GLUCOSE UR QL: 500 MG/DL
GRAM STN FLD: SIGNIFICANT CHANGE UP
HADV DNA SPEC QL NAA+PROBE: SIGNIFICANT CHANGE UP
HCO3 BLDV-SCNC: 24 MMOL/L — SIGNIFICANT CHANGE UP (ref 22–29)
HCO3 BLDV-SCNC: 25 MMOL/L — SIGNIFICANT CHANGE UP (ref 22–29)
HCOV 229E RNA SPEC QL NAA+PROBE: SIGNIFICANT CHANGE UP
HCOV HKU1 RNA SPEC QL NAA+PROBE: SIGNIFICANT CHANGE UP
HCOV NL63 RNA SPEC QL NAA+PROBE: SIGNIFICANT CHANGE UP
HCOV OC43 RNA SPEC QL NAA+PROBE: SIGNIFICANT CHANGE UP
HCT VFR BLD CALC: 27.6 % — LOW (ref 39–50)
HCT VFR BLD CALC: 29.9 % — LOW (ref 39–50)
HCT VFR BLD CALC: 30.1 % — LOW (ref 39–50)
HCT VFR BLD CALC: 30.7 % — LOW (ref 39–50)
HCT VFR BLDA CALC: 29 % — LOW (ref 39–51)
HDLC SERPL-MCNC: 32 MG/DL — LOW
HGB BLD CALC-MCNC: 9.8 G/DL — LOW (ref 12.6–17.4)
HGB BLD-MCNC: 10.1 G/DL — LOW (ref 13–17)
HGB BLD-MCNC: 10.2 G/DL — LOW (ref 13–17)
HGB BLD-MCNC: 10.5 G/DL — LOW (ref 13–17)
HGB BLD-MCNC: 9.2 G/DL — LOW (ref 13–17)
HMPV RNA SPEC QL NAA+PROBE: SIGNIFICANT CHANGE UP
HPIV1 RNA SPEC QL NAA+PROBE: SIGNIFICANT CHANGE UP
HPIV2 RNA SPEC QL NAA+PROBE: SIGNIFICANT CHANGE UP
HPIV3 RNA SPEC QL NAA+PROBE: SIGNIFICANT CHANGE UP
HPIV4 RNA SPEC QL NAA+PROBE: SIGNIFICANT CHANGE UP
IANC: 11.72 K/UL — HIGH (ref 1.8–7.4)
IANC: 8.77 K/UL — HIGH (ref 1.8–7.4)
IMM GRANULOCYTES NFR BLD AUTO: 0.8 % — SIGNIFICANT CHANGE UP (ref 0–0.9)
IMM GRANULOCYTES NFR BLD AUTO: 1.1 % — HIGH (ref 0–0.9)
INR BLD: 0.98 RATIO — SIGNIFICANT CHANGE UP (ref 0.85–1.18)
IRON SATN MFR SERPL: 23 % — SIGNIFICANT CHANGE UP (ref 14–50)
IRON SATN MFR SERPL: 50 UG/DL — SIGNIFICANT CHANGE UP (ref 45–165)
KETONES UR-MCNC: NEGATIVE MG/DL — SIGNIFICANT CHANGE UP
LACTATE BLDV-MCNC: 1.2 MMOL/L — SIGNIFICANT CHANGE UP (ref 0.5–2)
LEUKOCYTE ESTERASE UR-ACNC: ABNORMAL
LIPID PNL WITH DIRECT LDL SERPL: SIGNIFICANT CHANGE UP MG/DL
LYMPHOCYTES # BLD AUTO: 1.71 K/UL — SIGNIFICANT CHANGE UP (ref 1–3.3)
LYMPHOCYTES # BLD AUTO: 14.2 % — SIGNIFICANT CHANGE UP (ref 13–44)
LYMPHOCYTES # BLD AUTO: 14.3 % — SIGNIFICANT CHANGE UP (ref 13–44)
LYMPHOCYTES # BLD AUTO: 2.18 K/UL — SIGNIFICANT CHANGE UP (ref 1–3.3)
M PNEUMO DNA SPEC QL NAA+PROBE: SIGNIFICANT CHANGE UP
MAGNESIUM SERPL-MCNC: 1.5 MG/DL — LOW (ref 1.6–2.6)
MAGNESIUM SERPL-MCNC: 1.8 MG/DL — SIGNIFICANT CHANGE UP (ref 1.6–2.6)
MCHC RBC-ENTMCNC: 27.4 PG — SIGNIFICANT CHANGE UP (ref 27–34)
MCHC RBC-ENTMCNC: 27.4 PG — SIGNIFICANT CHANGE UP (ref 27–34)
MCHC RBC-ENTMCNC: 27.7 PG — SIGNIFICANT CHANGE UP (ref 27–34)
MCHC RBC-ENTMCNC: 28.2 PG — SIGNIFICANT CHANGE UP (ref 27–34)
MCHC RBC-ENTMCNC: 33.3 GM/DL — SIGNIFICANT CHANGE UP (ref 32–36)
MCHC RBC-ENTMCNC: 33.8 GM/DL — SIGNIFICANT CHANGE UP (ref 32–36)
MCHC RBC-ENTMCNC: 33.9 GM/DL — SIGNIFICANT CHANGE UP (ref 32–36)
MCHC RBC-ENTMCNC: 34.2 GM/DL — SIGNIFICANT CHANGE UP (ref 32–36)
MCV RBC AUTO: 80.9 FL — SIGNIFICANT CHANGE UP (ref 80–100)
MCV RBC AUTO: 82.1 FL — SIGNIFICANT CHANGE UP (ref 80–100)
MCV RBC AUTO: 82.1 FL — SIGNIFICANT CHANGE UP (ref 80–100)
MCV RBC AUTO: 82.5 FL — SIGNIFICANT CHANGE UP (ref 80–100)
METHOD TYPE: SIGNIFICANT CHANGE UP
MONOCYTES # BLD AUTO: 0.88 K/UL — SIGNIFICANT CHANGE UP (ref 0–0.9)
MONOCYTES # BLD AUTO: 1 K/UL — HIGH (ref 0–0.9)
MONOCYTES NFR BLD AUTO: 6.5 % — SIGNIFICANT CHANGE UP (ref 2–14)
MONOCYTES NFR BLD AUTO: 7.4 % — SIGNIFICANT CHANGE UP (ref 2–14)
MRSA PCR RESULT.: SIGNIFICANT CHANGE UP
NEUTROPHILS # BLD AUTO: 11.72 K/UL — HIGH (ref 1.8–7.4)
NEUTROPHILS # BLD AUTO: 8.77 K/UL — HIGH (ref 1.8–7.4)
NEUTROPHILS NFR BLD AUTO: 73.4 % — SIGNIFICANT CHANGE UP (ref 43–77)
NEUTROPHILS NFR BLD AUTO: 76.4 % — SIGNIFICANT CHANGE UP (ref 43–77)
NITRITE UR-MCNC: POSITIVE
NON HDL CHOLESTEROL: 115 MG/DL — SIGNIFICANT CHANGE UP
NRBC # BLD: 0 /100 WBCS — SIGNIFICANT CHANGE UP (ref 0–0)
NRBC # FLD: 0 K/UL — SIGNIFICANT CHANGE UP (ref 0–0)
ORGANISM # SPEC MICROSCOPIC CNT: SIGNIFICANT CHANGE UP
OSMOLALITY SERPL: 281 MOSM/KG — SIGNIFICANT CHANGE UP (ref 275–295)
OSMOLALITY UR: 334 MOSM/KG — SIGNIFICANT CHANGE UP (ref 50–1200)
PCO2 BLDV: 41 MMHG — LOW (ref 42–55)
PCO2 BLDV: 56 MMHG — HIGH (ref 42–55)
PH BLDV: 7.26 — LOW (ref 7.32–7.43)
PH BLDV: 7.38 — SIGNIFICANT CHANGE UP (ref 7.32–7.43)
PH UR: 6 — SIGNIFICANT CHANGE UP (ref 5–8)
PHOSPHATE SERPL-MCNC: 2.8 MG/DL — SIGNIFICANT CHANGE UP (ref 2.5–4.5)
PHOSPHATE SERPL-MCNC: 3 MG/DL — SIGNIFICANT CHANGE UP (ref 2.5–4.5)
PHOSPHATE SERPL-MCNC: 3.8 MG/DL — SIGNIFICANT CHANGE UP (ref 2.5–4.5)
PHOSPHATE SERPL-MCNC: 3.9 MG/DL — SIGNIFICANT CHANGE UP (ref 2.5–4.5)
PLATELET # BLD AUTO: 297 K/UL — SIGNIFICANT CHANGE UP (ref 150–400)
PLATELET # BLD AUTO: 313 K/UL — SIGNIFICANT CHANGE UP (ref 150–400)
PLATELET # BLD AUTO: 315 K/UL — SIGNIFICANT CHANGE UP (ref 150–400)
PLATELET # BLD AUTO: 344 K/UL — SIGNIFICANT CHANGE UP (ref 150–400)
PO2 BLDV: 48 MMHG — HIGH (ref 25–45)
PO2 BLDV: 65 MMHG — HIGH (ref 25–45)
POTASSIUM BLDV-SCNC: 4.3 MMOL/L — SIGNIFICANT CHANGE UP (ref 3.5–5.1)
POTASSIUM SERPL-MCNC: 4.2 MMOL/L — SIGNIFICANT CHANGE UP (ref 3.5–5.3)
POTASSIUM SERPL-MCNC: 4.5 MMOL/L — SIGNIFICANT CHANGE UP (ref 3.5–5.3)
POTASSIUM SERPL-MCNC: 4.7 MMOL/L — SIGNIFICANT CHANGE UP (ref 3.5–5.3)
POTASSIUM SERPL-MCNC: 5.1 MMOL/L — SIGNIFICANT CHANGE UP (ref 3.5–5.3)
POTASSIUM SERPL-MCNC: 5.6 MMOL/L — HIGH (ref 3.5–5.3)
POTASSIUM SERPL-SCNC: 4.2 MMOL/L — SIGNIFICANT CHANGE UP (ref 3.5–5.3)
POTASSIUM SERPL-SCNC: 4.5 MMOL/L — SIGNIFICANT CHANGE UP (ref 3.5–5.3)
POTASSIUM SERPL-SCNC: 4.7 MMOL/L — SIGNIFICANT CHANGE UP (ref 3.5–5.3)
POTASSIUM SERPL-SCNC: 5.1 MMOL/L — SIGNIFICANT CHANGE UP (ref 3.5–5.3)
POTASSIUM SERPL-SCNC: 5.6 MMOL/L — HIGH (ref 3.5–5.3)
PROT SERPL-MCNC: 6.5 G/DL — SIGNIFICANT CHANGE UP (ref 6–8.3)
PROT SERPL-MCNC: 6.6 G/DL — SIGNIFICANT CHANGE UP (ref 6–8.3)
PROT SERPL-MCNC: 6.7 G/DL — SIGNIFICANT CHANGE UP (ref 6–8.3)
PROT SERPL-MCNC: 6.8 G/DL — SIGNIFICANT CHANGE UP (ref 6–8.3)
PROT UR-MCNC: SIGNIFICANT CHANGE UP MG/DL
PROTHROM AB SERPL-ACNC: 11.1 SEC — SIGNIFICANT CHANGE UP (ref 9.5–13)
RAPID RVP RESULT: SIGNIFICANT CHANGE UP
RBC # BLD: 3.36 M/UL — LOW (ref 4.2–5.8)
RBC # BLD: 3.64 M/UL — LOW (ref 4.2–5.8)
RBC # BLD: 3.72 M/UL — LOW (ref 4.2–5.8)
RBC # BLD: 3.72 M/UL — LOW (ref 4.2–5.8)
RBC # FLD: 12.6 % — SIGNIFICANT CHANGE UP (ref 10.3–14.5)
RBC # FLD: 12.7 % — SIGNIFICANT CHANGE UP (ref 10.3–14.5)
RBC # FLD: 12.9 % — SIGNIFICANT CHANGE UP (ref 10.3–14.5)
RBC # FLD: 12.9 % — SIGNIFICANT CHANGE UP (ref 10.3–14.5)
RSV RNA SPEC QL NAA+PROBE: SIGNIFICANT CHANGE UP
RV+EV RNA SPEC QL NAA+PROBE: SIGNIFICANT CHANGE UP
S AUREUS DNA NOSE QL NAA+PROBE: DETECTED
SAO2 % BLDV: 75.5 % — SIGNIFICANT CHANGE UP (ref 67–88)
SAO2 % BLDV: 94.4 % — HIGH (ref 67–88)
SARS-COV-2 RNA SPEC QL NAA+PROBE: SIGNIFICANT CHANGE UP
SODIUM SERPL-SCNC: 126 MMOL/L — LOW (ref 135–145)
SODIUM SERPL-SCNC: 129 MMOL/L — LOW (ref 135–145)
SODIUM SERPL-SCNC: 129 MMOL/L — LOW (ref 135–145)
SODIUM SERPL-SCNC: 132 MMOL/L — LOW (ref 135–145)
SODIUM SERPL-SCNC: 133 MMOL/L — LOW (ref 135–145)
SODIUM UR-SCNC: 29 MMOL/L — SIGNIFICANT CHANGE UP
SP GR SPEC: 1.01 — SIGNIFICANT CHANGE UP (ref 1–1.03)
SPECIMEN SOURCE: SIGNIFICANT CHANGE UP
TIBC SERPL-MCNC: 214 UG/DL — LOW (ref 220–430)
TRANSFERRIN SERPL-MCNC: 179 MG/DL — LOW (ref 200–360)
TRIGL SERPL-MCNC: 403 MG/DL — HIGH
UIBC SERPL-MCNC: 164 UG/DL — SIGNIFICANT CHANGE UP (ref 110–370)
UROBILINOGEN FLD QL: 0.2 MG/DL — SIGNIFICANT CHANGE UP (ref 0.2–1)
WBC # BLD: 11.34 K/UL — HIGH (ref 3.8–10.5)
WBC # BLD: 11.94 K/UL — HIGH (ref 3.8–10.5)
WBC # BLD: 15.35 K/UL — HIGH (ref 3.8–10.5)
WBC # BLD: 21.53 K/UL — HIGH (ref 3.8–10.5)
WBC # FLD AUTO: 11.34 K/UL — HIGH (ref 3.8–10.5)
WBC # FLD AUTO: 11.94 K/UL — HIGH (ref 3.8–10.5)
WBC # FLD AUTO: 15.35 K/UL — HIGH (ref 3.8–10.5)
WBC # FLD AUTO: 21.53 K/UL — HIGH (ref 3.8–10.5)

## 2023-01-01 PROCEDURE — 99239 HOSP IP/OBS DSCHRG MGMT >30: CPT

## 2023-01-01 PROCEDURE — 99233 SBSQ HOSP IP/OBS HIGH 50: CPT | Mod: GC

## 2023-01-01 PROCEDURE — 99232 SBSQ HOSP IP/OBS MODERATE 35: CPT

## 2023-01-01 PROCEDURE — 99233 SBSQ HOSP IP/OBS HIGH 50: CPT

## 2023-01-01 PROCEDURE — 99291 CRITICAL CARE FIRST HOUR: CPT

## 2023-01-01 PROCEDURE — 99223 1ST HOSP IP/OBS HIGH 75: CPT | Mod: GC

## 2023-01-01 PROCEDURE — 71045 X-RAY EXAM CHEST 1 VIEW: CPT | Mod: 26

## 2023-01-01 PROCEDURE — 70450 CT HEAD/BRAIN W/O DYE: CPT | Mod: 26

## 2023-01-01 RX ORDER — IPRATROPIUM/ALBUTEROL SULFATE 18-103MCG
3 AEROSOL WITH ADAPTER (GRAM) INHALATION EVERY 12 HOURS
Refills: 0 | Status: DISCONTINUED | OUTPATIENT
Start: 2023-01-01 | End: 2023-01-01

## 2023-01-01 RX ORDER — POLYETHYLENE GLYCOL 3350 17 G/17G
17 POWDER, FOR SOLUTION ORAL DAILY
Refills: 0 | Status: DISCONTINUED | OUTPATIENT
Start: 2023-01-01 | End: 2023-01-01

## 2023-01-01 RX ORDER — DOXAZOSIN MESYLATE 4 MG
1 TABLET ORAL
Refills: 0 | DISCHARGE

## 2023-01-01 RX ORDER — CEFTRIAXONE 500 MG/1
1000 INJECTION, POWDER, FOR SOLUTION INTRAMUSCULAR; INTRAVENOUS EVERY 24 HOURS
Refills: 0 | Status: DISCONTINUED | OUTPATIENT
Start: 2023-01-01 | End: 2023-01-01

## 2023-01-01 RX ORDER — IPRATROPIUM/ALBUTEROL SULFATE 18-103MCG
3 AEROSOL WITH ADAPTER (GRAM) INHALATION ONCE
Refills: 0 | Status: DISCONTINUED | OUTPATIENT
Start: 2023-01-01 | End: 2023-01-01

## 2023-01-01 RX ORDER — CEFEPIME 1 G/1
INJECTION, POWDER, FOR SOLUTION INTRAMUSCULAR; INTRAVENOUS
Refills: 0 | Status: DISCONTINUED | OUTPATIENT
Start: 2023-01-01 | End: 2023-01-01

## 2023-01-01 RX ORDER — SODIUM CHLORIDE 9 MG/ML
1000 INJECTION INTRAMUSCULAR; INTRAVENOUS; SUBCUTANEOUS
Refills: 0 | Status: DISCONTINUED | OUTPATIENT
Start: 2023-01-01 | End: 2023-01-01

## 2023-01-01 RX ORDER — NIFEDIPINE 30 MG
30 TABLET, EXTENDED RELEASE 24 HR ORAL DAILY
Refills: 0 | Status: DISCONTINUED | OUTPATIENT
Start: 2023-01-01 | End: 2023-01-01

## 2023-01-01 RX ORDER — MUPIROCIN 20 MG/G
1 OINTMENT TOPICAL
Refills: 0 | Status: DISCONTINUED | OUTPATIENT
Start: 2023-01-01 | End: 2023-01-01

## 2023-01-01 RX ORDER — PIPERACILLIN AND TAZOBACTAM 4; .5 G/20ML; G/20ML
3.38 INJECTION, POWDER, LYOPHILIZED, FOR SOLUTION INTRAVENOUS EVERY 8 HOURS
Refills: 0 | Status: DISCONTINUED | OUTPATIENT
Start: 2023-01-01 | End: 2023-01-01

## 2023-01-01 RX ORDER — MAGNESIUM OXIDE 400 MG ORAL TABLET 241.3 MG
400 TABLET ORAL DAILY
Refills: 0 | Status: DISCONTINUED | OUTPATIENT
Start: 2023-01-01 | End: 2023-01-01

## 2023-01-01 RX ORDER — SENNA PLUS 8.6 MG/1
2 TABLET ORAL AT BEDTIME
Refills: 0 | Status: DISCONTINUED | OUTPATIENT
Start: 2023-01-01 | End: 2023-01-01

## 2023-01-01 RX ORDER — INSULIN LISPRO 100/ML
VIAL (ML) SUBCUTANEOUS
Refills: 0 | Status: DISCONTINUED | OUTPATIENT
Start: 2023-01-01 | End: 2023-01-01

## 2023-01-01 RX ORDER — PANTOPRAZOLE SODIUM 20 MG/1
40 TABLET, DELAYED RELEASE ORAL
Refills: 0 | Status: DISCONTINUED | OUTPATIENT
Start: 2023-01-01 | End: 2023-01-01

## 2023-01-01 RX ORDER — IPRATROPIUM/ALBUTEROL SULFATE 18-103MCG
3 AEROSOL WITH ADAPTER (GRAM) INHALATION
Qty: 0 | Refills: 0 | DISCHARGE
Start: 2023-01-01

## 2023-01-01 RX ORDER — ATORVASTATIN CALCIUM 80 MG/1
10 TABLET, FILM COATED ORAL AT BEDTIME
Refills: 0 | Status: DISCONTINUED | OUTPATIENT
Start: 2023-01-01 | End: 2023-01-01

## 2023-01-01 RX ORDER — INSULIN LISPRO 100/ML
VIAL (ML) SUBCUTANEOUS AT BEDTIME
Refills: 0 | Status: DISCONTINUED | OUTPATIENT
Start: 2023-01-01 | End: 2023-01-01

## 2023-01-01 RX ORDER — INSULIN GLARGINE 100 [IU]/ML
12 INJECTION, SOLUTION SUBCUTANEOUS AT BEDTIME
Refills: 0 | Status: DISCONTINUED | OUTPATIENT
Start: 2023-01-01 | End: 2023-01-01

## 2023-01-01 RX ORDER — INSULIN LISPRO 100/ML
0 VIAL (ML) SUBCUTANEOUS
Qty: 0 | Refills: 0 | DISCHARGE

## 2023-01-01 RX ORDER — INSULIN LISPRO 100/ML
17 VIAL (ML) SUBCUTANEOUS
Refills: 0 | DISCHARGE

## 2023-01-01 RX ORDER — ASPIRIN/CALCIUM CARB/MAGNESIUM 324 MG
81 TABLET ORAL DAILY
Refills: 0 | Status: DISCONTINUED | OUTPATIENT
Start: 2023-01-01 | End: 2023-01-01

## 2023-01-01 RX ORDER — CEFPODOXIME PROXETIL 100 MG
1 TABLET ORAL
Qty: 4 | Refills: 0
Start: 2023-01-01 | End: 2023-01-01

## 2023-01-01 RX ORDER — DEXTROSE 50 % IN WATER 50 %
25 SYRINGE (ML) INTRAVENOUS ONCE
Refills: 0 | Status: DISCONTINUED | OUTPATIENT
Start: 2023-01-01 | End: 2023-01-01

## 2023-01-01 RX ORDER — PIPERACILLIN AND TAZOBACTAM 4; .5 G/20ML; G/20ML
3.38 INJECTION, POWDER, LYOPHILIZED, FOR SOLUTION INTRAVENOUS ONCE
Refills: 0 | Status: COMPLETED | OUTPATIENT
Start: 2023-01-01 | End: 2023-01-01

## 2023-01-01 RX ORDER — VANCOMYCIN HCL 1 G
1000 VIAL (EA) INTRAVENOUS ONCE
Refills: 0 | Status: COMPLETED | OUTPATIENT
Start: 2023-01-01 | End: 2023-01-01

## 2023-01-01 RX ORDER — LANOLIN ALCOHOL/MO/W.PET/CERES
3 CREAM (GRAM) TOPICAL AT BEDTIME
Refills: 0 | Status: DISCONTINUED | OUTPATIENT
Start: 2023-01-01 | End: 2023-01-01

## 2023-01-01 RX ORDER — SODIUM CHLORIDE 9 MG/ML
2 INJECTION INTRAMUSCULAR; INTRAVENOUS; SUBCUTANEOUS DAILY
Refills: 0 | Status: DISCONTINUED | OUTPATIENT
Start: 2023-01-01 | End: 2023-01-01

## 2023-01-01 RX ORDER — CEFEPIME 1 G/1
1000 INJECTION, POWDER, FOR SOLUTION INTRAMUSCULAR; INTRAVENOUS ONCE
Refills: 0 | Status: COMPLETED | OUTPATIENT
Start: 2023-01-01 | End: 2023-01-01

## 2023-01-01 RX ORDER — AMLODIPINE BESYLATE 2.5 MG/1
1 TABLET ORAL
Qty: 30 | Refills: 0
Start: 2023-01-01 | End: 2023-01-01

## 2023-01-01 RX ORDER — INSULIN GLARGINE 100 [IU]/ML
15 INJECTION, SOLUTION SUBCUTANEOUS AT BEDTIME
Refills: 0 | Status: DISCONTINUED | OUTPATIENT
Start: 2023-01-01 | End: 2023-01-01

## 2023-01-01 RX ORDER — CEFEPIME 1 G/1
1000 INJECTION, POWDER, FOR SOLUTION INTRAMUSCULAR; INTRAVENOUS EVERY 12 HOURS
Refills: 0 | Status: DISCONTINUED | OUTPATIENT
Start: 2023-01-01 | End: 2023-01-01

## 2023-01-01 RX ORDER — SODIUM CHLORIDE 9 MG/ML
1 INJECTION INTRAMUSCULAR; INTRAVENOUS; SUBCUTANEOUS
Qty: 0 | Refills: 0 | DISCHARGE

## 2023-01-01 RX ORDER — TAMSULOSIN HYDROCHLORIDE 0.4 MG/1
0.4 CAPSULE ORAL AT BEDTIME
Refills: 0 | Status: DISCONTINUED | OUTPATIENT
Start: 2023-01-01 | End: 2023-01-01

## 2023-01-01 RX ORDER — INSULIN DETEMIR 100/ML (3)
12 INSULIN PEN (ML) SUBCUTANEOUS
Qty: 0 | Refills: 0 | DISCHARGE

## 2023-01-01 RX ORDER — METOPROLOL TARTRATE 50 MG
25 TABLET ORAL
Refills: 0 | Status: DISCONTINUED | OUTPATIENT
Start: 2023-01-01 | End: 2023-01-01

## 2023-01-01 RX ORDER — DEXTROSE 50 % IN WATER 50 %
12.5 SYRINGE (ML) INTRAVENOUS ONCE
Refills: 0 | Status: DISCONTINUED | OUTPATIENT
Start: 2023-01-01 | End: 2023-01-01

## 2023-01-01 RX ORDER — SODIUM CHLORIDE 9 MG/ML
1000 INJECTION INTRAMUSCULAR; INTRAVENOUS; SUBCUTANEOUS ONCE
Refills: 0 | Status: COMPLETED | OUTPATIENT
Start: 2023-01-01 | End: 2023-01-01

## 2023-01-01 RX ORDER — ONDANSETRON 8 MG/1
4 TABLET, FILM COATED ORAL EVERY 8 HOURS
Refills: 0 | Status: DISCONTINUED | OUTPATIENT
Start: 2023-01-01 | End: 2023-01-01

## 2023-01-01 RX ORDER — ACETAMINOPHEN 500 MG
650 TABLET ORAL EVERY 6 HOURS
Refills: 0 | Status: DISCONTINUED | OUTPATIENT
Start: 2023-01-01 | End: 2023-01-01

## 2023-01-01 RX ORDER — DEXTROSE 50 % IN WATER 50 %
15 SYRINGE (ML) INTRAVENOUS ONCE
Refills: 0 | Status: DISCONTINUED | OUTPATIENT
Start: 2023-01-01 | End: 2023-01-01

## 2023-01-01 RX ORDER — MUPIROCIN 20 MG/G
1 OINTMENT TOPICAL
Qty: 0 | Refills: 0 | DISCHARGE
Start: 2023-01-01 | End: 2023-01-01

## 2023-01-01 RX ORDER — PANTOPRAZOLE SODIUM 20 MG/1
40 TABLET, DELAYED RELEASE ORAL DAILY
Refills: 0 | Status: ACTIVE | OUTPATIENT
Start: 2023-01-01 | End: 2024-09-06

## 2023-01-01 RX ORDER — GLUCAGON INJECTION, SOLUTION 0.5 MG/.1ML
1 INJECTION, SOLUTION SUBCUTANEOUS ONCE
Refills: 0 | Status: DISCONTINUED | OUTPATIENT
Start: 2023-01-01 | End: 2023-01-01

## 2023-01-01 RX ORDER — TAMSULOSIN HYDROCHLORIDE 0.4 MG/1
1 CAPSULE ORAL
Qty: 0 | Refills: 0 | DISCHARGE

## 2023-01-01 RX ORDER — TRAZODONE HCL 50 MG
0.5 TABLET ORAL
Refills: 0 | DISCHARGE

## 2023-01-01 RX ORDER — MAGNESIUM OXIDE 400 MG ORAL TABLET 241.3 MG
1 TABLET ORAL
Qty: 0 | Refills: 0 | DISCHARGE

## 2023-01-01 RX ORDER — AMLODIPINE BESYLATE 2.5 MG/1
10 TABLET ORAL DAILY
Refills: 0 | Status: DISCONTINUED | OUTPATIENT
Start: 2023-01-01 | End: 2023-01-01

## 2023-01-01 RX ORDER — MAGNESIUM SULFATE 500 MG/ML
2 VIAL (ML) INJECTION ONCE
Refills: 0 | Status: COMPLETED | OUTPATIENT
Start: 2023-01-01 | End: 2023-01-01

## 2023-01-01 RX ORDER — ENOXAPARIN SODIUM 100 MG/ML
40 INJECTION SUBCUTANEOUS EVERY 24 HOURS
Refills: 0 | Status: DISCONTINUED | OUTPATIENT
Start: 2023-01-01 | End: 2023-01-01

## 2023-01-01 RX ORDER — METOPROLOL TARTRATE 50 MG
1 TABLET ORAL
Qty: 0 | Refills: 0 | DISCHARGE

## 2023-01-01 RX ORDER — LISINOPRIL 2.5 MG/1
20 TABLET ORAL DAILY
Refills: 0 | Status: DISCONTINUED | OUTPATIENT
Start: 2023-01-01 | End: 2023-01-01

## 2023-01-01 RX ADMIN — PIPERACILLIN AND TAZOBACTAM 25 GRAM(S): 4; .5 INJECTION, POWDER, LYOPHILIZED, FOR SOLUTION INTRAVENOUS at 18:17

## 2023-01-01 RX ADMIN — INSULIN GLARGINE 15 UNIT(S): 100 INJECTION, SOLUTION SUBCUTANEOUS at 21:47

## 2023-01-01 RX ADMIN — PIPERACILLIN AND TAZOBACTAM 3.38 GRAM(S): 4; .5 INJECTION, POWDER, LYOPHILIZED, FOR SOLUTION INTRAVENOUS at 01:30

## 2023-01-01 RX ADMIN — SODIUM CHLORIDE 2 GRAM(S): 9 INJECTION INTRAMUSCULAR; INTRAVENOUS; SUBCUTANEOUS at 12:11

## 2023-01-01 RX ADMIN — MUPIROCIN 1 APPLICATION(S): 20 OINTMENT TOPICAL at 17:21

## 2023-01-01 RX ADMIN — INSULIN GLARGINE 12 UNIT(S): 100 INJECTION, SOLUTION SUBCUTANEOUS at 21:52

## 2023-01-01 RX ADMIN — PIPERACILLIN AND TAZOBACTAM 25 GRAM(S): 4; .5 INJECTION, POWDER, LYOPHILIZED, FOR SOLUTION INTRAVENOUS at 09:47

## 2023-01-01 RX ADMIN — POLYETHYLENE GLYCOL 3350 17 GRAM(S): 17 POWDER, FOR SOLUTION ORAL at 11:49

## 2023-01-01 RX ADMIN — SODIUM CHLORIDE 2 GRAM(S): 9 INJECTION INTRAMUSCULAR; INTRAVENOUS; SUBCUTANEOUS at 11:49

## 2023-01-01 RX ADMIN — ENOXAPARIN SODIUM 40 MILLIGRAM(S): 100 INJECTION SUBCUTANEOUS at 18:11

## 2023-01-01 RX ADMIN — AMLODIPINE BESYLATE 10 MILLIGRAM(S): 2.5 TABLET ORAL at 12:45

## 2023-01-01 RX ADMIN — Medication 1: at 09:01

## 2023-01-01 RX ADMIN — Medication 250 MILLIGRAM(S): at 01:34

## 2023-01-01 RX ADMIN — CEFTRIAXONE 100 MILLIGRAM(S): 500 INJECTION, POWDER, FOR SOLUTION INTRAMUSCULAR; INTRAVENOUS at 16:55

## 2023-01-01 RX ADMIN — INSULIN GLARGINE 15 UNIT(S): 100 INJECTION, SOLUTION SUBCUTANEOUS at 21:41

## 2023-01-01 RX ADMIN — SODIUM CHLORIDE 100 MILLILITER(S): 9 INJECTION INTRAMUSCULAR; INTRAVENOUS; SUBCUTANEOUS at 17:47

## 2023-01-01 RX ADMIN — MUPIROCIN 1 APPLICATION(S): 20 OINTMENT TOPICAL at 18:36

## 2023-01-01 RX ADMIN — PIPERACILLIN AND TAZOBACTAM 25 GRAM(S): 4; .5 INJECTION, POWDER, LYOPHILIZED, FOR SOLUTION INTRAVENOUS at 10:21

## 2023-01-01 RX ADMIN — MUPIROCIN 1 APPLICATION(S): 20 OINTMENT TOPICAL at 05:40

## 2023-01-01 RX ADMIN — PANTOPRAZOLE SODIUM 40 MILLIGRAM(S): 20 TABLET, DELAYED RELEASE ORAL at 12:45

## 2023-01-01 RX ADMIN — Medication 3 MILLIGRAM(S): at 21:49

## 2023-01-01 RX ADMIN — SODIUM CHLORIDE 1000 MILLILITER(S): 9 INJECTION INTRAMUSCULAR; INTRAVENOUS; SUBCUTANEOUS at 05:26

## 2023-01-01 RX ADMIN — Medication 81 MILLIGRAM(S): at 11:49

## 2023-01-01 RX ADMIN — SODIUM CHLORIDE 100 MILLILITER(S): 9 INJECTION INTRAMUSCULAR; INTRAVENOUS; SUBCUTANEOUS at 09:20

## 2023-01-01 RX ADMIN — Medication 1000 MILLIGRAM(S): at 03:00

## 2023-01-01 RX ADMIN — SODIUM CHLORIDE 2 GRAM(S): 9 INJECTION INTRAMUSCULAR; INTRAVENOUS; SUBCUTANEOUS at 12:56

## 2023-01-01 RX ADMIN — Medication 3 MILLILITER(S): at 10:17

## 2023-01-01 RX ADMIN — Medication 81 MILLIGRAM(S): at 12:50

## 2023-01-01 RX ADMIN — PIPERACILLIN AND TAZOBACTAM 200 GRAM(S): 4; .5 INJECTION, POWDER, LYOPHILIZED, FOR SOLUTION INTRAVENOUS at 00:03

## 2023-01-01 RX ADMIN — POLYETHYLENE GLYCOL 3350 17 GRAM(S): 17 POWDER, FOR SOLUTION ORAL at 12:51

## 2023-01-01 RX ADMIN — Medication 1: at 12:51

## 2023-01-01 RX ADMIN — Medication 3 MILLILITER(S): at 16:55

## 2023-01-01 RX ADMIN — MUPIROCIN 1 APPLICATION(S): 20 OINTMENT TOPICAL at 05:38

## 2023-01-01 RX ADMIN — AMLODIPINE BESYLATE 10 MILLIGRAM(S): 2.5 TABLET ORAL at 05:40

## 2023-01-01 RX ADMIN — Medication 25 GRAM(S): at 12:55

## 2023-01-01 RX ADMIN — ENOXAPARIN SODIUM 40 MILLIGRAM(S): 100 INJECTION SUBCUTANEOUS at 17:20

## 2023-01-01 RX ADMIN — ATORVASTATIN CALCIUM 10 MILLIGRAM(S): 80 TABLET, FILM COATED ORAL at 21:50

## 2023-01-01 RX ADMIN — AMLODIPINE BESYLATE 10 MILLIGRAM(S): 2.5 TABLET ORAL at 05:13

## 2023-01-01 RX ADMIN — Medication 1: at 12:55

## 2023-01-01 RX ADMIN — POLYETHYLENE GLYCOL 3350 17 GRAM(S): 17 POWDER, FOR SOLUTION ORAL at 12:13

## 2023-01-01 RX ADMIN — CEFTRIAXONE 100 MILLIGRAM(S): 500 INJECTION, POWDER, FOR SOLUTION INTRAMUSCULAR; INTRAVENOUS at 17:20

## 2023-01-01 RX ADMIN — MUPIROCIN 1 APPLICATION(S): 20 OINTMENT TOPICAL at 18:07

## 2023-01-01 RX ADMIN — SODIUM CHLORIDE 2 GRAM(S): 9 INJECTION INTRAMUSCULAR; INTRAVENOUS; SUBCUTANEOUS at 12:50

## 2023-01-01 RX ADMIN — Medication 1: at 18:07

## 2023-01-01 RX ADMIN — Medication 81 MILLIGRAM(S): at 12:11

## 2023-01-01 RX ADMIN — PANTOPRAZOLE SODIUM 40 MILLIGRAM(S): 20 TABLET, DELAYED RELEASE ORAL at 12:50

## 2023-01-01 RX ADMIN — ATORVASTATIN CALCIUM 10 MILLIGRAM(S): 80 TABLET, FILM COATED ORAL at 21:49

## 2023-01-01 RX ADMIN — Medication 81 MILLIGRAM(S): at 12:44

## 2023-01-01 RX ADMIN — Medication 3 MILLILITER(S): at 22:24

## 2023-01-01 RX ADMIN — ATORVASTATIN CALCIUM 10 MILLIGRAM(S): 80 TABLET, FILM COATED ORAL at 21:52

## 2023-01-01 RX ADMIN — Medication 3 MILLIGRAM(S): at 21:53

## 2023-01-01 RX ADMIN — MUPIROCIN 1 APPLICATION(S): 20 OINTMENT TOPICAL at 05:13

## 2023-01-01 RX ADMIN — PIPERACILLIN AND TAZOBACTAM 25 GRAM(S): 4; .5 INJECTION, POWDER, LYOPHILIZED, FOR SOLUTION INTRAVENOUS at 17:46

## 2023-01-01 RX ADMIN — CEFEPIME 100 MILLIGRAM(S): 1 INJECTION, POWDER, FOR SOLUTION INTRAMUSCULAR; INTRAVENOUS at 18:06

## 2023-01-01 RX ADMIN — SODIUM CHLORIDE 100 MILLILITER(S): 9 INJECTION INTRAMUSCULAR; INTRAVENOUS; SUBCUTANEOUS at 13:40

## 2023-01-01 RX ADMIN — PIPERACILLIN AND TAZOBACTAM 25 GRAM(S): 4; .5 INJECTION, POWDER, LYOPHILIZED, FOR SOLUTION INTRAVENOUS at 02:06

## 2023-01-01 RX ADMIN — CEFEPIME 100 MILLIGRAM(S): 1 INJECTION, POWDER, FOR SOLUTION INTRAMUSCULAR; INTRAVENOUS at 05:40

## 2023-10-07 NOTE — ED ADULT NURSE NOTE - OBJECTIVE STATEMENT
Break covering RN: Patient arrives to room 18, A&OX0, awake, alert, bed bound patient. h/o dementia, DM, CVA, HLD, HTN. Patient is coming to ED via EMS in regards to increase AMS. Patient unable to answer any questions at this time, family not at bedside. Patient hypothermic at triage, patient placed on bear hugger, patient arrives with 22G IV to right hand, dressing is dry clean and intact and BL leg pressure injury device on ankles. MD Hernandez will contact family for more information. will continue to monitor.

## 2023-10-07 NOTE — ED PROVIDER NOTE - CLINICAL SUMMARY MEDICAL DECISION MAKING FREE TEXT BOX
A/P 79 yo M with multiple medical problems as described above, transferred to the ED after being found unresponsive. exam noted for a change in mental status, hypothermic, r/o sepsis  Plan: Sepsis protocol, HCT, CXR, monitor, admit

## 2023-10-07 NOTE — ED ADULT TRIAGE NOTE - CHIEF COMPLAINT QUOTE
AMS, unresponsiveness , increased work of breath and  hypoxia x 40 minutes, from [facility]. PT is bed bound, confused but alert. PT is hyperglycemic in triage. Hx of DM, dementia, CAD, HLD, HTN, CVA

## 2023-10-07 NOTE — ED ADULT NURSE NOTE - NSICDXFAMILYHX_GEN_ALL_CORE_FT
hand grasp, leg strength strong and equal bilaterally FAMILY HISTORY:  No pertinent family history in first degree relatives

## 2023-10-07 NOTE — ED PROVIDER NOTE - PROGRESS NOTE DETAILS
Nasra:   DEVINH: 5 [id number]  Nurse Nadiya [phone]  Ext [phone] ANAHI Hilton MD PGY-2: attempted to reach pt son (Huey Sanchez) via call to no answer x3. Collateral obtained from nursing supervisor @ [facility] regarding code status: full per prior documentation.

## 2023-10-07 NOTE — ED ADULT NURSE NOTE - CHIEF COMPLAINT QUOTE
AMS, unresponsiveness , increased work of breath and  hypoxia x 40 minutes, from [facility]. PT is bed bound, confused but alert. PT is hyperglycemic in triage. Hx of DM, dementia, CAD, HLD, HTN, CVA

## 2023-10-07 NOTE — ED PROVIDER NOTE - OBJECTIVE STATEMENT
Attending/Nasra: 77 yo M PARUL after found unresponsive. Transfer papers were not provided by transferring EMS unit (reportedly told triage nurse they lost the papers).  Pt is nonverbal, collateral information obtained from [facility] unit nurse. Pt has a h/o DM2, CAD s/p CABG, HTN, CVA, dementia, reported baseline is bedbound, speaks a few words tonight was found by staff to be unresponsive with the following vital signs: 114/51  67  POX 95% on RA, FSG 57, given Glucagonx2, D5.

## 2023-10-07 NOTE — ED ADULT NURSE NOTE - SUICIDE SCREENING QUESTION 1
Patient unable to complete [Follow-Up - Clinic] : a clinic follow-up of [Coronary Artery Disease] : coronary artery disease [Hyperlipidemia] : hyperlipidemia [Hypertension] : hypertension

## 2023-10-07 NOTE — ED PROVIDER NOTE - PHYSICAL EXAMINATION
Followed protocol
Attending/Nasra: Responsive only to noxious stimuli, +dry mucosa, RRR, sonorous BS, Abd-sfof, NT/ND, no LE edema, +contracted; no decubti; Skin: Cool, dry  Rectal Temp: 94.7

## 2023-10-07 NOTE — ED ADULT NURSE NOTE - NSFALLRISKINTERV_ED_ALL_ED

## 2023-10-08 PROBLEM — F03.90 UNSPECIFIED DEMENTIA WITHOUT BEHAVIORAL DISTURBANCE: Chronic | Status: ACTIVE | Noted: 2020-01-16

## 2023-10-08 PROBLEM — F03.90 UNSPECIFIED DEMENTIA, UNSPECIFIED SEVERITY, WITHOUT BEHAVIORAL DISTURBANCE, PSYCHOTIC DISTURBANCE, MOOD DISTURBANCE, AND ANXIETY: Chronic | Status: ACTIVE | Noted: 2020-01-16

## 2023-10-08 NOTE — H&P ADULT - PROBLEM SELECTOR PLAN 4
c/w home meds C/w home meds Na 129, chronic in nature. Corrected for hyperglycemia Glucose 235 --> 131  On home NaCl 1000 mg 2 tabs TID daily    Plan:  Starting 2 g NaCl daily  Receiving IVF NS  CTM BMP Pt with initial hypoglycemia, corrected after glucagon x2 and D5. Home regimen: Levemir 49 units at bedtime. Lispro 17 units TID with meals.    Plan:  check HbA1c  Once diet resumed, resume insulin at 40% of home dose- Lantus 24 units at bedtime, Admelog 6 units TID before meals  FS Q6 when NPO  FS TIDAC and QHS  Low dose ISS with meals and at bedtime Pt with AMS 2/2 hypoglycemia, corrected after glucagon x2 and D5. Home regimen: Levemir 49 U qHS. Lispro 17 U TIDAC     Plan:  check HbA1c  FS Q6 when NPO  Once diet resumed, resume insulin at 40% of home dose- Lantus 24 units at bedtime, Admelog 6 units TID before meals  FS TIDAC and QHS  Low dose ISS with meals and at bedtime

## 2023-10-08 NOTE — H&P ADULT - ATTENDING COMMENTS
78M with PMH of dementia, vertebro-basilar artery syndrome, HTN, HLD, IDDM, CAD s/p CABG, TIA/CVA, BPH, osteoarthritis BIBEMS from [facility] for unresponsiveness. Per ED documentation, reportedly patient was found unresponsive for BG 57, given glucagon x 2 and D5. Pt a/w severe sepsis 2/2 UTI, metabolic encephalopathy, hyperkalemia, and hyponatremia. Mental status improved on interview, A&Ox2.     #Severe sepsis 2/2 UTI   -UA positive   -S/p Vanc/zosyn in ED   -Previous Ucx with klebsiella resistant to CTX. C/w Zosyn for now.   -RVP/MRSA PCR ordered   -Lactate 2.3 in ED. Received 1L NS in ED, repeat lactate 3.4. Start mIVF and continue to trend.   -F/u Bcx/Ucx   -Bladder scan q8 to monitor for urinary retention     #Metabolic encephalopathy   -Multifactorial due to sepsis, electrolyte derangements, and hypoglycemia   -CTH with old lacunar infarcts, no acute findings   -Now improving, A&Ox2. Will need collateral regarding baseline mental status.   -BG q6H while NPO. Dysphagia screen, then advance diet.     #Hypoglycemia   -Pt on Levemir 49U qHS and admelog 17U qAC at NH. Possibly hypoglycemic in setting of sepsis and insulin given while pt not eating. Also concern for BB toxicity (sinus bradycardia, and hyperkalemia on presentation) now s/p glucagon.   -BG q6H while NPO  -Reduce insulin by 50%   -Hold BB     #Hyperkalemia   -K 5.6 on presentation without ECG changes  -Improved to 4.7 on repeat labs. Kidney function at baseline.   -CTM     #Hyponatremia  -Na 129 on presentation (per VERONICA, Na 130 on 10/5). Chronic hyponatremia.   -Per [facility], pt on NaCl tabs 2g TID, unclear why. ?SIADH  -Can restart NaCl 2g QD as patient getting IVF as well   -CTM     Rest of plan above as per Dr. Burt.

## 2023-10-08 NOTE — H&P ADULT - PROBLEM SELECTOR PLAN 7
DVT ppx: Lovenox 40 mg daily  Diet: pending s/s eval  Dispo: pending course c/w home meds On aspirin 81 mg and atorvastatin 10 mg once daily    Plan:  f/u lipid profile  c/w home meds once passes bedside dysphagia screen

## 2023-10-08 NOTE — H&P ADULT - PROBLEM SELECTOR PLAN 3
C/w home meds Moderate chronic hyponatremia Na 129 Na 129  Corrected for hyperglycemia Glucose 235 --> 131  Pseudohyponatremia    Plan:  CTM Pt with initial hypoglycemia, corrected after glucagon x2 and D5. Home regimen: Levemir 49 units at bedtime. Lispro 17 units TID with meals.    Plan:  check HbA1c  Once diet resumed, resume insulin at 40% of home dose- Lantus 24 units at bedtime, Admelog 6 units TID before meals  FS Q6 when NPO  FS TIDAC and QHS  Low dose ISS with meals and at bedtime Multifactorial iso hypoglycemia and sepsis 2/2 UTI and acidosis and bradycardia iso possible beta blocker toxicity  Hypoglycemia resolved after glucagon x2 and D5. Mental status now improved A&Ox2    Plan:  NPO until passes bedside dysphagia screen  Resume insulin at 40% home dose    Hold beta blocker metoprolol tartrate 25 mg BID  Hold antihypertensives Multifactorial due to sepsis, hypoglycemia, electrolyte derangements and  bradycardia iso possible beta blocker toxicity. Hypoglycemia resolved after glucagon x2 and D5. Mental status now improved A&Ox2. Hyperkalemia to 5.6 wo EKG changes, now resolved    Plan:  NPO until passes bedside dysphagia screen  Resume insulin at 40% home dose    Hold beta blocker metoprolol tartrate 25 mg BID  Hold antihypertensives

## 2023-10-08 NOTE — H&P ADULT - PROBLEM SELECTOR PLAN 9
DVT ppx: Lovenox 40 mg daily  Diet: pending dysphagia screen --> likely puree  Dispo: pending course, PT eval

## 2023-10-08 NOTE — PATIENT PROFILE ADULT - FALL HARM RISK - HARM RISK INTERVENTIONS
Assistance with ambulation/Assistance OOB with selected safe patient handling equipment/Communicate Risk of Fall with Harm to all staff/Discuss with provider need for PT consult/Monitor gait and stability/Reinforce activity limits and safety measures with patient and family/Tailored Fall Risk Interventions/Visual Cue: Yellow wristband and red socks/Bed in lowest position, wheels locked, appropriate side rails in place/Call bell, personal items and telephone in reach/Instruct patient to call for assistance before getting out of bed or chair/Non-slip footwear when patient is out of bed/[place] to call system/Physically safe environment - no spills, clutter or unnecessary equipment/Purposeful Proactive Rounding/Room/bathroom lighting operational, light cord in reach

## 2023-10-08 NOTE — H&P ADULT - PROBLEM SELECTOR PROBLEM 5
CAD (coronary artery disease) History of CVA (cerebrovascular accident) HTN (hypertension) Hyponatremia

## 2023-10-08 NOTE — H&P ADULT - PROBLEM SELECTOR PLAN 5
c/w home meds Home BP meds:  Enalapril 20 mg BID, Cardura 2 mg daily, metoprolol tartrate 25 mg BD, amlodipine 10 mg daily. Currently hypotensive iso sepsis    Plan:  Holding home meds iso hypotension   Resume as tolerated Na 129, chronic in nature. Corrected for hyperglycemia Glucose 235 --> 131  On home NaCl 1000 mg 2 tabs TID daily    Plan:  Starting 2 g NaCl daily  Receiving IVF NS  CTM BMP Na 129, chronic in nature (per  on 10/5). Corrected for hyperglycemia Glucose 235 --> 131  On home NaCl 1000 mg 2 tabs TID daily    Plan:  Starting 2 g NaCl daily  Receiving IVF NS  CTM BMP

## 2023-10-08 NOTE — H&P ADULT - PROBLEM SELECTOR PLAN 2
Pt with initial hypoglycemia, corrected after glucagon x2 and   FS elevated to     Plan:  check HbA1c  FS TIDAC and QHS  Low dose ISS Multifactorial iso hypoglycemia and sepsis 2/2 UTI and acisosis and bradycardia iso possible beta blocker toxicity  Hypoglycemia resolved after glucagon x2 and D5. Mental status now improved A&Ox2    Plan:  NPO until passes bedside dysphagia screen  Resume insulin at 40% home dose    Hold beta blocker metoprolol tartrate 25 mg BID  Hold antihypertensives S/p vanc and zosyn and 1L NS in ED. Prior hx of UTI with citrobacter sensitive to zosyn    Plan:  c/w zosyn (10/7-  f/u MRSA pcr and RVP  f/u blood cx and urine cx  monitor bladder scans for urinary retention S/p vanc and zosyn and 1L NS in ED. Prior hx of UTI with citrobacter sensitive to zosyn    Plan:  c/w zosyn (10/7-  f/u MRSA pcr and RVP  f/u blood cx and urine cx  monitor bladder scans q8 for urinary retention

## 2023-10-08 NOTE — PATIENT PROFILE ADULT - NSPROGENPREVTRANSF_GEN_A_NUR
[Nasal Endoscopy Performed] : nasal endoscopy was performed, see procedure section for findings [Normal] : cardiovascular peripheral examination is normal unknown

## 2023-10-08 NOTE — H&P ADULT - PROBLEM SELECTOR PLAN 1
Pt presented with AMS, hypothermia to 95 F and WBC 21.53. Likely UTI- UA positive for nitrites, small leuk esterase, many bacteria,  S/p zosyn and 1L NS in ED    Plan:  f/u blood cx and urine cx  c/w zosyn (10/7-  c/w IVF  cc/hr   f/u VBG to trend lactate Pt presented with AMS, hypothermia to 95 F and WBC 21.53. Likely UTI- UA positive for nitrites, small leuk esterase, many bacteria,  S/p zosyn and 1L NS in ED. Prior hx of UTI with citrobacter sensitive to zosyn    Plan:  c/w zosyn (10/7-  f/u MRSA pcr and RVP  f/u blood cx and urine cx  c/w IVF  cc/hr   f/u VBG to trend lactate Pt presented with AMS, hypothermia to 95 F and WBC 21.53. Likely UTI- UA positive for nitrites, small leuk esterase, many bacteria,  S/p vanc and zosyn and 1L NS in ED. Prior hx of UTI with citrobacter sensitive to zosyn    Plan:  c/w zosyn (10/7-  f/u MRSA pcr and RVP  f/u blood cx and urine cx  c/w IVF  cc/hr   f/u VBG to trend lactate Pt presented with AMS, hypothermia to 95 F and leukocytosis to 21.53 w left shift.  UA positive for nitrites, small leuk esterase, many bacteria,  S/p vanc and zosyn and 1L NS in ED. Prior hx of UTI with citrobacter, sensitive to zosyn    Plan:  c/w zosyn (10/7-  f/u MRSA pcr and RVP  f/u blood cx and urine cx  c/w IVF  cc/hr   f/u VBG to trend lactate

## 2023-10-08 NOTE — H&P ADULT - PROBLEM SELECTOR PROBLEM 6
Need for prophylactic measure CAD (coronary artery disease) History of CVA (cerebrovascular accident) HTN (hypertension)

## 2023-10-08 NOTE — H&P ADULT - HISTORY OF PRESENT ILLNESS
77 yo M with a history of Type 2 DM, CAD s/p CABG, HTN, CVA, dementia (baseline bedbound, speaks few words), who was BIBEMS after he was found unresponsive at [facility] with the following vital signs: /51 HR  67  O2 Sat 95% on RA, POCT glucose 57. The pt was given Glucagon x2, D5.    In the ED, vital signs were T 95, HR 58, BP 99/42, RR 22, Sa O2 98% on 2L   Labs notable for WBC 21.53, Hb 10.5, HCT 30.7, Na 129, K 5.6, AG 18,   VBG 7.26, PCO2 56, PO2 48, HCO3 25, Lactate 2.3  UA: cloudy, +nitrites, small leuk esterase, 67 wbc. many bacteria, glucose 500     CXR: clear lungs. CT Head: Atrophy and chronic microvascular ischemic changes. 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 No acute intracranial pathology.    In the ED, started on Zosyn and received 1L NS. Blood cx and urine cx were collected. 79 yo M with a history of Type 2 DM, CAD s/p CABG, HTN, CVA, dementia (baseline bedbound, speaks few words), who was BIBEMS after he was found unresponsive at [facility] with the following vital signs: /51 HR  67  O2 Sat 95% on RA, POCT glucose 57. The pt was given Glucagon x2, D5.    In the ED, vital signs were T 95, HR 58, BP 99/42, RR 22, Sa O2 98% on 2L   Labs notable for WBC 21.53, H/H 10.5/30.7, Na 129, K 5.6, AG 18, , Na 129, K 5.6, Gluc 235   VBG 7.26, PCO2 56, PO2 48, HCO3 25, Lactate 2.3  UA: cloudy, +nitrites, small leuk esterase, 67 wbc. many bacteria, glucose 500     CXR: clear lungs. CT Head: Atrophy and chronic microvascular ischemic changes. 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 No acute intracranial pathology.    In the ED, started on Zosyn and received 1L NS. Blood cx and urine cx were collected. 77 yo M with a history of Type 2 DM, CAD s/p CABG, HTN, CVA, dementia (baseline bedbound, speaks few words), who was BIBEMS after he was found unresponsive at [facility] with the following vital signs: /51 HR  67  O2 Sat 95% on RA, POCT glucose 57. The pt was given Glucagon x2, D5.    In the ED, vital signs were T 95, HR 58, BP 99/42, RR 22, Sa O2 98% on 2L   Labs notable for WBC 21.53, H/H 10.5/30.7, Na 129, K 5.6, AG 18, , Na 129, K 5.6, Gluc 235   VBG 7.26, PCO2 56, PO2 48, HCO3 25, Lactate 2.3  UA: cloudy, +nitrites, small leuk esterase, 67 wbc. many bacteria, glucose 500     CXR: clear lungs. CT Head: Atrophy and chronic microvascular ischemic changes. 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 No acute intracranial pathology.    In the ED, started on Vanc x1 dose and Zosyn and received 1L NS. Blood cx and urine cx were collected. 79 yo M with a history of Type 2 DM, CAD s/p CABG, HTN, CVA, dementia (baseline bedbound, speaks few words), who was BIBEMS after he was found unresponsive at [facility] with the following vital signs: /51 HR  67  O2 Sat 95% on RA, POCT glucose 57. The pt was given Glucagon x2, D5.    In the ED, vital signs were T 95, HR 58, BP 99/42, RR 22, Sa O2 98% on 2L   Labs notable for WBC 21.53, H/H 10.5/30.7, Na 129, K 5.6, AG 18, , Na 129, K 5.6, Gluc 235   VBG 7.26, PCO2 56, PO2 48, HCO3 25, Lactate 2.3  UA: cloudy, +nitrites, small leuk esterase, 67 wbc. many bacteria, glucose 500     EKG with sinus bradycardia./ CXR: clear lungs. CT Head: Atrophy and chronic microvascular ischemic changes. 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 No acute intracranial pathology.    In the ED, started on Vanc x1 dose and Zosyn and received 1L NS. Blood cx and urine cx were collected. 77 yo M with a history of Type 2 DM, CAD s/p CABG, HTN, CVA, dementia (baseline bedbound, speaks few words), who was BIBEMS after he was found unresponsive at [facility] with the following vital signs: /51 HR  67  O2 Sat 95% on RA, POCT glucose 57. The pt was given Glucagon x2, D5.  In the ED, vital signs were T 95, HR 58, BP 99/42, RR 22, Sa O2 98% on 2L   Labs notable for WBC 21.53, H/H 10.5/30.7, Na 129, K 5.6, AG 18, , Na 129, K 5.6, Gluc 235   VBG 7.26, PCO2 56, PO2 48, HCO3 25, Lactate 2.3  UA: cloudy, +nitrites, small leuk esterase, 67 wbc. many bacteria, glucose 500   EKG with sinus bradycardia./ CXR: clear lungs. CT Head: Atrophy and chronic microvascular ischemic changes. 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 No acute intracranial pathology.  In the ED, started on Vanc x1 dose and Zosyn and received 1L NS. Blood cx and urine cx were collected. 79 yo M with a history of Type 2 DM, CAD s/p CABG, HTN, CVA, dementia (baseline bedbound, speaks few words), who was BIBEMS after he was found unresponsive at [facility] with the following vital signs: /51 HR  67  O2 Sat 95% on RA, POCT glucose 57. The pt was given Glucagon x2, D5.    In the ED, vital signs were T 95, HR 58, BP 99/42, RR 22, Sa O2 98% on 2L   Labs notable for WBC 21.53, H/H 10.5/30.7, Na 129, K 5.6, AG 18, , Na 129, K 5.6, Gluc 235   VBG 7.26, PCO2 56, PO2 48, HCO3 25, Lactate 2.3  UA: cloudy, +nitrites, small leuk esterase, 67 wbc. many bacteria, glucose 500   EKG with sinus bradycardia./ CXR: clear lungs. CT Head: Atrophy and chronic microvascular ischemic changes. 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 No acute intracranial pathology.  In the ED, started on Vanc x1 dose and Zosyn and received 1L NS. Blood cx and urine cx were collected.

## 2023-10-08 NOTE — H&P ADULT - ASSESSMENT
79 yo M with a history of Type 2 DM, CAD s/p CABG, HTN, CVA, dementia (baseline bedbound, speaks few words), who was BIBEMS after he was found unresponsive at [facility] with hypothermia and hypoglycemia which corrected s/p Glucagon x2, D5. Labs notable for leukocytosis, elevated lactate, +UA, admitted for sepsis likely 2/2 UTI  77 yo M with a history of Type 2 DM, CAD s/p CABG, HTN, CVA, dementia (baseline bedbound, speaks few words), who was BIBEMS after he was found unresponsive at [facility] with hypothermia and hypoglycemia which corrected s/p Glucagon x2, D5. Labs notable for leukocytosis, elevated lactate, hyponatremia 129, +UA with nitrites and leuk esterase, admitted for sepsis likely 2/2 UTI  77 yo M with a history of Type 2 DM, CAD s/p CABG, HTN, CVA, dementia (baseline bedbound, speaks few words), who was BIBEMS after he was found unresponsive at [facility] with hypothermia and hypoglycemia which corrected s/p Glucagon x2, D5. Labs notable for leukocytosis, elevated lactate, hyponatremia 129, +UA with nitrites and leuk esterase, admitted for severe sepsis  2/2 UTI and metabolic encephalopathy iso of sepsis, hypoglycemia and likely beta blocker toxicity 79 yo M with a history of Type 2 DM, CAD s/p CABG, HTN, CVA, dementia (baseline bedbound, speaks few words), who was BIBEMS after he was found unresponsive at [facility] with hypothermia and hypoglycemia which corrected s/p Glucagon x2, D5. Labs notable for leukocytosis, elevated lactate, hyponatremia 129, +UA with nitrites and leuk esterase, admitted for severe sepsis  2/2 UTI and metabolic encephalopathy iso of sepsis, hypoglycemia, hyperkalemia, hyponatremia, and likely beta blocker toxicity. Mental status now improved to A&Ox2

## 2023-10-08 NOTE — H&P ADULT - NSHPLABSRESULTS_GEN_ALL_CORE
LABS:                          10.5   21.53 )-----------( 344      ( 07 Oct 2023 23:23 )             30.7     10-08    126<L>  |  91<L>  |  22  ----------------------------<  222<H>  5.1   |  21<L>  |  1.11    Ca    8.8      08 Oct 2023 03:14  Phos  3.9     10-07  Mg     1.50     10-07    TPro  6.7  /  Alb  3.8  /  TBili  0.5  /  DBili  x   /  AST  13  /  ALT  11  /  AlkPhos  76  10-08    PT/INR - ( 08 Oct 2023 00:21 )   PT: 11.1 sec;   INR: 0.98 ratio         PTT - ( 08 Oct 2023 00:21 )  PTT:26.0 sec      CT Head: Atrophy and chronic microvascular ischemic changes. 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 No acute intracranial pathology.

## 2023-10-08 NOTE — H&P ADULT - NSHPREVIEWOFSYSTEMS_GEN_ALL_CORE
REVIEW OF SYSTEMS:    CONSTITUTIONAL: No weakness, fevers or chills  EYES/ENT: No visual changes;  No vertigo or throat pain   NECK: No pain or stiffness  RESPIRATORY: No cough, wheezing, hemoptysis; No shortness of breath  CARDIOVASCULAR: No chest pain or palpitations  GASTROINTESTINAL: No abdominal or epigastric pain. No nausea, vomiting, or hematemesis; No diarrhea or constipation. No melena or hematochezia.  GENITOURINARY: No dysuria, frequency or hematuria  NEUROLOGICAL: No numbness or weakness  SKIN: No itching, rashes REVIEW OF SYSTEMS:     CONSTITUTIONAL: No weakness, fevers or chills  EYES/ENT: No visual changes;  No vertigo or throat pain   NECK: No pain or stiffness  RESPIRATORY: No cough, wheezing, hemoptysis; No shortness of breath  CARDIOVASCULAR: No chest pain or palpitations  GASTROINTESTINAL: No abdominal or epigastric pain. No nausea, vomiting, or hematemesis; No diarrhea or constipation. No melena or hematochezia.  GENITOURINARY: No dysuria, frequency or hematuria  NEUROLOGICAL: No numbness or weakness  SKIN: No itching, rashes

## 2023-10-08 NOTE — H&P ADULT - PROBLEM SELECTOR PLAN 8
DVT ppx: Lovenox 40 mg daily  Diet: pending dysphagia screen --> likely puree  Dispo: pending course, PT eval c/w home meds

## 2023-10-08 NOTE — H&P ADULT - PROBLEM SELECTOR PLAN 6
DVT ppx: Lovenox 40 mg daily  Diet: pending s/s eval  Dispo: pending course c/w home meds On aspirin 81 mg and atorvastatin 10 mg once daily    Plan:  f/u lipid profile  c/w home meds once passes bedside dysphagia screen Home BP meds:  Enalapril 20 mg BID, Cardura 2 mg daily, metoprolol tartrate 25 mg BD, amlodipine 10 mg daily. Currently hypotensive iso sepsis    Plan:  Holding home meds iso hypotension   Resume as tolerated

## 2023-10-08 NOTE — H&P ADULT - PROBLEM SELECTOR PROBLEM 7
Need for prophylactic measure CAD (coronary artery disease) History of CVA (cerebrovascular accident)

## 2023-10-08 NOTE — H&P ADULT - NSHPPHYSICALEXAM_GEN_ALL_CORE
VITALS:   T(C): 36.6 (10-08-23 @ 06:55), Max: 36.6 (10-08-23 @ 05:10)  HR: 68 (10-08-23 @ 06:55) (58 - 76)  BP: 115/78 (10-08-23 @ 06:55) (99/42 - 125/65)  RR: 17 (10-08-23 @ 06:55) (17 - 24)  SpO2: 99% (10-08-23 @ 06:55) (98% - 100%)    GENERAL: NAD, lying in bed comfortably  HEAD:  Atraumatic, normocephalic  EYES: EOMI, PERRLA, conjunctiva and sclera clear  ENT: Dry mucous membranes  NECK: Supple, no JVD  HEART: Regular rate and rhythm, no murmurs, rubs, or gallops  LUNGS: Unlabored respirations.  Clear to auscultation bilaterally, no crackles, wheezing, or rhonchi  ABDOMEN: Soft, nontender, nondistended, +BS  EXTREMITIES: Contracted, 2+ peripheral pulses bilaterally. No clubbing, cyanosis, or edema  NERVOUS SYSTEM:  A&Ox0, responsive only to noxious stimuli, no focal deficits   SKIN: Cool and dry. No rashes or lesions, no decubiti VITALS:   T(C): 36.6 (10-08-23 @ 06:55), Max: 36.6 (10-08-23 @ 05:10)  HR: 68 (10-08-23 @ 06:55) (58 - 76)  BP: 115/78 (10-08-23 @ 06:55) (99/42 - 125/65)  RR: 17 (10-08-23 @ 06:55) (17 - 24)  SpO2: 99% (10-08-23 @ 06:55) (98% - 100%)    GENERAL: NAD, frail appearing, lying in bed comfortably  HEAD:  Atraumatic, normocephalic  EYES: EOMI, PERRLA, conjunctiva and sclera clear  ENT: Dry mucous membranes  NECK: Supple, no JVD  HEART: Regular rate and rhythm, no murmurs, rubs, or gallops  LUNGS: Unlabored respirations.  Clear to auscultation bilaterally, no crackles, wheezing, or rhonchi  ABDOMEN: Soft, nontender, nondistended, +BS  EXTREMITIES: Contracted, 2+ peripheral pulses bilaterally. No clubbing, cyanosis, or edema  NERVOUS SYSTEM:  A&Ox2, no focal deficits   SKIN: Cool and dry. No rashes or lesions, no decubiti

## 2023-10-09 NOTE — PROGRESS NOTE ADULT - PROBLEM SELECTOR PLAN 1
Pt presented with AMS, hypothermia to 95 F and leukocytosis to 21.53 w left shift.  UA positive for nitrites, small leuk esterase, many bacteria,  S/p vanc and zosyn and 1L NS in ED. Prior hx of UTI with klebsiella resistant to CTX, sensitive to zosyn. RVP neg. Blood cx NG    Plan:  c/w zosyn (10/7-  f/u MRSA pcr  f/u blood cx and urine cx  c/w IVF  cc/hr   f/u VBG to trend lactate

## 2023-10-09 NOTE — PHYSICAL THERAPY INITIAL EVALUATION ADULT - ACTIVE RANGE OF MOTION EXAMINATION, REHAB EVAL
Right elbow flexion WFL; right elbow extension lacking ~ 80 degrees; bilateral ankles WFL; bilateral knee flexion WFL; right knee extension lacking ~90 degrees; left knee extension lacking ~80 degrees/Left UE Active ROM was WFL (within functional limits)

## 2023-10-09 NOTE — PROGRESS NOTE ADULT - PROBLEM SELECTOR PLAN 4
Pt with AMS 2/2 hypoglycemia, corrected after glucagon x2 and D5. Home regimen: Levemir 49 U qHS. Lispro 17 U TIDAC     Plan:  check HbA1c  FS Q6 when NPO  Once diet resumed, resume insulin at 40% of home dose- Lantus 24 units at bedtime, Admelog 6 units TID before meals  FS TIDAC and QHS  Low dose ISS with meals and at bedtime

## 2023-10-09 NOTE — PROVIDER CONTACT NOTE (OTHER) - ASSESSMENT
bladder scan showed 200mL residual at 22:00 bladder scan showed 200mL residual at 22:00. Patient asymptomatic.

## 2023-10-09 NOTE — PROGRESS NOTE ADULT - PROBLEM SELECTOR PLAN 7
On aspirin 81 mg and atorvastatin 10 mg once daily    Plan:  f/u lipid profile  c/w home meds once passes bedside dysphagia screen

## 2023-10-09 NOTE — PROGRESS NOTE ADULT - PROBLEM SELECTOR PLAN 2
S/p vanc and zosyn and 1L NS in ED. Prior hx of UTI with citrobacter sensitive to zosyn    Plan:  c/w zosyn (10/7-  f/u MRSA pcr and RVP  f/u blood cx and urine cx  monitor bladder scans q8 for urinary retention

## 2023-10-09 NOTE — PHYSICAL THERAPY INITIAL EVALUATION ADULT - DIAGNOSIS, PT EVAL
Impairments in active and passive ROM, muscle tone, functional mobility, and strength; pt likely at functional baseline

## 2023-10-09 NOTE — PROGRESS NOTE ADULT - PROBLEM SELECTOR PLAN 3
Multifactorial due to sepsis, hypoglycemia, electrolyte derangements and  bradycardia iso possible beta blocker toxicity. Hypoglycemia resolved after glucagon x2 and D5. Mental status now improved A&Ox2. Hyperkalemia to 5.6 wo EKG changes, now resolved    Plan:  NPO until passes bedside dysphagia screen  Resume insulin at 40% home dose    Hold beta blocker metoprolol tartrate 25 mg BID  Hold antihypertensives

## 2023-10-09 NOTE — PHYSICAL THERAPY INITIAL EVALUATION ADULT - GENERAL OBSERVATIONS, REHAB EVAL
Upon entry, pt semi-supine in bed in NAD. Upon entry, pt semi-supine in bed in NAD. HR 80 bpm. Pt left as received with all tubes/lines intact, bed alarm on, call bell in reach and in NAD.

## 2023-10-09 NOTE — PHYSICAL THERAPY INITIAL EVALUATION ADULT - NSPTDISCHREC_GEN_A_CORE
Pt likely at/near functional baseline. Return to [facility] with no skilled PT needs. To be removed from PT program./No skilled PT needs

## 2023-10-09 NOTE — PHYSICAL THERAPY INITIAL EVALUATION ADULT - ADDITIONAL COMMENTS
Pt is AAOx1 to self only; unable to provide information regarding prior level of function and living situation. Per H&P, patient is a resident of [facility] and bedbound.

## 2023-10-09 NOTE — PHYSICAL THERAPY INITIAL EVALUATION ADULT - PERTINENT HX OF CURRENT PROBLEM, REHAB EVAL
Pt is a Pt is a 78 year old male presenting from [facility] for unresponsiveness. Pt found to be hypoglycemic, hyperkalemic, and hyponatremic and EKG revealed sinus bradycardia. CT head negative for acute findings; revealed atrophy and chronic microvascular ischemic changes and old bilateral lacunar infarcts. Pt admitted for severe sepsis secondary to UTI and metabolic encephalopathy likely beta blocker toxicity.

## 2023-10-10 NOTE — DIETITIAN INITIAL EVALUATION ADULT - PROBLEM SELECTOR PLAN 6
Home BP meds:  Enalapril 20 mg BID, Cardura 2 mg daily, metoprolol tartrate 25 mg BD, amlodipine 10 mg daily. Currently hypotensive iso sepsis    Plan:  Holding home meds iso hypotension   Resume as tolerated

## 2023-10-10 NOTE — DIETITIAN INITIAL EVALUATION ADULT - PROBLEM SELECTOR PLAN 5
Na 129, chronic in nature (per  on 10/5). Corrected for hyperglycemia Glucose 235 --> 131  On home NaCl 1000 mg 2 tabs TID daily    Plan:  Starting 2 g NaCl daily  Receiving IVF NS  CTM BMP

## 2023-10-10 NOTE — PROGRESS NOTE ADULT - PROBLEM SELECTOR PLAN 9
DVT ppx: Lovenox 40 mg daily  Diet: pending dysphagia screen --> likely puree  Dispo: pending course, PT eval DVT ppx: Lovenox 40 mg daily  Diet: puree w thin liquids  Dispo: pending course, to [facility]

## 2023-10-10 NOTE — DIETITIAN INITIAL EVALUATION ADULT - PERTINENT MEDS FT
MEDICATIONS  (STANDING):  albuterol/ipratropium for Nebulization. 3 milliLiter(s) Nebulizer once  amLODIPine   Tablet 10 milliGRAM(s) Oral daily  aspirin  chewable 81 milliGRAM(s) Oral daily  atorvastatin 10 milliGRAM(s) Oral at bedtime  dextrose 50% Injectable 12.5 Gram(s) IV Push once  dextrose 50% Injectable 25 Gram(s) IV Push once  dextrose 50% Injectable 25 Gram(s) IV Push once  dextrose Oral Gel 15 Gram(s) Oral once  enoxaparin Injectable 40 milliGRAM(s) SubCutaneous every 24 hours  glucagon  Injectable 1 milliGRAM(s) IntraMuscular once  insulin glargine Injectable (LANTUS) 15 Unit(s) SubCutaneous at bedtime  insulin lispro (ADMELOG) corrective regimen sliding scale   SubCutaneous at bedtime  insulin lispro (ADMELOG) corrective regimen sliding scale   SubCutaneous three times a day before meals  mupirocin 2% Ointment 1 Application(s) Topical two times a day  pantoprazole  Injectable 40 milliGRAM(s) IV Push daily  piperacillin/tazobactam IVPB.. 3.375 Gram(s) IV Intermittent every 8 hours  polyethylene glycol 3350 17 Gram(s) Oral daily  senna 2 Tablet(s) Oral at bedtime  sodium chloride 2 Gram(s) Oral daily  sodium chloride 0.9%. 1000 milliLiter(s) (100 mL/Hr) IV Continuous <Continuous>  sodium chloride 0.9%. 1000 milliLiter(s) (100 mL/Hr) IV Continuous <Continuous>    MEDICATIONS  (PRN):  acetaminophen     Tablet .. 650 milliGRAM(s) Oral every 6 hours PRN Temp greater or equal to 38C (100.4F)  aluminum hydroxide/magnesium hydroxide/simethicone Suspension 30 milliLiter(s) Oral every 4 hours PRN Dyspepsia  melatonin 3 milliGRAM(s) Oral at bedtime PRN Insomnia  ondansetron Injectable 4 milliGRAM(s) IV Push every 8 hours PRN Nausea and/or Vomiting

## 2023-10-10 NOTE — DIETITIAN INITIAL EVALUATION ADULT - PROBLEM SELECTOR PLAN 1
Pt presented with AMS, hypothermia to 95 F and leukocytosis to 21.53 w left shift.  UA positive for nitrites, small leuk esterase, many bacteria,  S/p vanc and zosyn and 1L NS in ED. Prior hx of UTI with citrobacter, sensitive to zosyn    Plan:  c/w zosyn (10/7-  f/u MRSA pcr and RVP  f/u blood cx and urine cx  c/w IVF  cc/hr   f/u VBG to trend lactate

## 2023-10-10 NOTE — DIETITIAN INITIAL EVALUATION ADULT - ADD RECOMMEND
Continue w/ consistent carbohydrate, defer consistencies to team. Nutrition department will provide Orgain BID (440 kcal, 32 g pro). Monitor PO intake. Swallow evaluation to evaluate current dysphagia consistency. Nutrition department will provide Orgain BID (440 kcal, 32 g pro). Monitor PO intake.

## 2023-10-10 NOTE — DIETITIAN INITIAL EVALUATION ADULT - NS FNS DIET ORDER
Diet, Pureed:   Consistent Carbohydrate {No Snacks} (CSTCHO)  Mildly Thick Liquids (MILDTHICKLIQS) (10-10-23 @ 11:08)

## 2023-10-10 NOTE — DIETITIAN INITIAL EVALUATION ADULT - OTHER INFO
78 year old male with a PMH of Type 2 DM, CAD, HTN, CVA, dementia who was BIBEMS after he was found unresponsive at [facility] with hypothermia and hypoglycemia admitted for severe sepsis 2/2 UTI and metabolic encephalopathy per chart.    Patient currently on pureed/mildly thick liquids per chart. Noted w/ variable PO intake 0-75% per RN flow sheet. No GI distress noted. Has no food allergies. Unable to obtain UBW at this time. No recent weights per HIE. ABW is 56.4 kg (10/8) per chart. Noted w/ +1 generalized edema and no pressure injuries per RN flow sheet.

## 2023-10-10 NOTE — PROGRESS NOTE ADULT - PROBLEM SELECTOR PLAN 1
Pt presented with AMS, hypothermia to 95 F and leukocytosis to 21.53 w left shift.  UA positive for nitrites, small leuk esterase, many bacteria,  S/p vanc and zosyn and 1L NS in ED. Prior hx of UTI with klebsiella resistant to CTX, sensitive to zosyn. RVP neg. Blood cx NG    Plan:  c/w zosyn (10/7-  f/u MRSA pcr  f/u blood cx and urine cx  c/w IVF  cc/hr   f/u VBG to trend lactate Pt presented with AMS, hypothermia to 95 F and leukocytosis to 21.53 w left shift.  UA positive for nitrites, small leuk esterase, many bacteria,  S/p vanc and zosyn and 1L NS in ED. Prior hx of UTI with organism resistant to CTX, sensitive to zosyn. RVP neg. Blood cx NG. MRSA pcr neg. RVP neg. Lactate downtrended to 2.4 --> 1.2    Plan:  c/w zosyn (10/7-  PO hydration

## 2023-10-10 NOTE — DIETITIAN INITIAL EVALUATION ADULT - PROBLEM/PLAN-6
Likely 2nd to b/l pl effusions, atelectasis  -Suspect fluid overload given elevated proBNP, LE edema on admission   -Keep O>I with HD as tolerated   -Pt with hx of hydroPTX. CT chest in 2/2022 with pleural thickening, atelectasis. Findings at the time suspected to be chronic. CT A/P 4/2 with small b/l pl effusions L>R, pleural thickening  -CT chest now with small b/l pl effusion R>L, bibasilar atelectasis  -SOB resolved  -Keep sats >90% with supplemental O2 as needed. DISPLAY PLAN FREE TEXT

## 2023-10-10 NOTE — PROVIDER CONTACT NOTE (CRITICAL VALUE NOTIFICATION) - ASSESSMENT
Blood cultures collected 10/7/23 at 23:10pm, growth in anaerobic bottle gram positive cocci in clusters. Patient on zosyn and cefepime Blood cultures collected 10/7/23 at 23:10pm, growth in anaerobic bottle gram positive cocci in clusters. Patient on cefepime

## 2023-10-10 NOTE — DIETITIAN INITIAL EVALUATION ADULT - ORAL INTAKE PTA/DIET HISTORY
Patient seen for assessment. Unable to obtain information from patient 2/2 cognitive status. Information obtained from chart review. Noted w/ A1c 6.5%.

## 2023-10-10 NOTE — PROVIDER CONTACT NOTE (CRITICAL VALUE NOTIFICATION) - TEST AND RESULT REPORTED:
Blood cultures collected 10/7/23 at 23:10pm, growth in anaerobic bottle gram positive cocci in clusters

## 2023-10-10 NOTE — SWALLOW BEDSIDE ASSESSMENT ADULT - ASR SWALLOW RECOMMEND DIAG
Objective testing not warranted at this time given no overt signs of impaired airway protection and most recent CXR 10/7 indicating clear lungs and no indication for infection

## 2023-10-10 NOTE — PROGRESS NOTE ADULT - PROBLEM SELECTOR PLAN 7
On aspirin 81 mg and atorvastatin 10 mg once daily    Plan:  f/u lipid profile  c/w home meds once passes bedside dysphagia screen c/w  aspirin 81 mg and atorvastatin 10 mg once daily

## 2023-10-10 NOTE — PROGRESS NOTE ADULT - PROBLEM SELECTOR PLAN 4
Pt with AMS 2/2 hypoglycemia, corrected after glucagon x2 and D5. Home regimen: Levemir 49 U qHS. Lispro 17 U TIDAC     Plan:  check HbA1c  FS Q6 when NPO  Once diet resumed, resume insulin at 40% of home dose- Lantus 24 units at bedtime, Admelog 6 units TID before meals  FS TIDAC and QHS  Low dose ISS with meals and at bedtime Pt with AMS 2/2 hypoglycemia, corrected after glucagon x2 and D5. Home regimen: Levemir 49 U qHS. Lispro 17 U TIDAC. Started on Lantus 15 units QHS and low dose ISS     Plan:  HbA1c 6.5%  FS TIDAC and QHS  ISO AM hypoglycemia, decreased Lantus to 12 units nightly  Low dose ISS with meals and at bedtime

## 2023-10-10 NOTE — SWALLOW BEDSIDE ASSESSMENT ADULT - SWALLOW EVAL: RECOMMENDED FEEDING/EATING TECHNIQUES
Swallowing Guidelines: FEED WHEN ALERT STATE ONLY; Seated Upright during Mealtimes; Slow Pacing; Allow for Swallow Prior to Next Presentation; Maintain Oral Hygiene

## 2023-10-10 NOTE — SWALLOW BEDSIDE ASSESSMENT ADULT - ADDITIONAL RECOMMENDATIONS
This department to follow up as schedule permits to assess for diet tolerance. Medical team further advised to reconsult if there is a change in medical status and/or observed change in patients tolerance of recommended PO diet.

## 2023-10-10 NOTE — SWALLOW BEDSIDE ASSESSMENT ADULT - COMMENTS
Progress Note- Internal Medicine 10/10: "77 yo M with a history of Type 2 DM, CAD s/p CABG, HTN, CVA, dementia (baseline bedbound, speaks few words), who was BIBEMS after he was found unresponsive at [facility] with hypothermia and hypoglycemia which corrected s/p Glucagon x2, D5. Labs notable for leukocytosis, elevated lactate, hyponatremia 129, +UA with nitrites and leuk esterase, admitted for severe sepsis  2/2 UTI and metabolic encephalopathy iso of sepsis, hypoglycemia, hyperkalemia, hyponatremia, and likely beta blocker toxicity. Mental status now improved to A&Ox2."     CXR 10/7: "IMPRESSION: Clear lungs."    Patient is awake/alert to verbal stimuli during clinical swallow evaluation this PM. Patient denies difficulty swallowing stating "I don't have trouble with swallowing."

## 2023-10-10 NOTE — PROGRESS NOTE ADULT - PROBLEM SELECTOR PLAN 3
Multifactorial due to sepsis, hypoglycemia, electrolyte derangements and  bradycardia iso possible beta blocker toxicity. Hypoglycemia resolved after glucagon x2 and D5. Mental status now improved A&Ox2. Hyperkalemia to 5.6 wo EKG changes, now resolved    Plan:  NPO until passes bedside dysphagia screen  Resume insulin at 40% home dose    Hold beta blocker metoprolol tartrate 25 mg BID  Hold antihypertensives Multifactorial due to sepsis, hypoglycemia, electrolyte derangements and  bradycardia iso possible beta blocker toxicity. Hypoglycemia resolved after glucagon x2 and D5. Mental status now improved A&Ox2. Hyperkalemia to 5.6 wo EKG changes, now resolved    Plan:  Passed bedside dysphagia screen- Pureed diet with mildly thickened liquids, pending s/s eval today  Hold beta blocker metoprolol tartrate 25 mg BID  Hold antihypertensives, resume as BP tolerates

## 2023-10-10 NOTE — SWALLOW BEDSIDE ASSESSMENT ADULT - ORAL PREPARATORY PHASE
Slow/Prolonged Mastication likely exacerbated by incomplete dentition Within functional limits Slow Bolus Manipulation

## 2023-10-10 NOTE — SWALLOW BEDSIDE ASSESSMENT ADULT - SWALLOW EVAL: DIAGNOSIS
1- Mild Oral Stage for puree, soft/bite solids, and thin liquids marked by adequate oral containment, slow bolus manipulation, slow/prolonged mastication for soft/bite solids likely exacerbated by incomplete dentition with patient stating "It's too hard", slow anterior to posterior transfer, mild/moderate oral residue which clear with liquid wash. 2- Functional pharyngeal stage for puree, soft/bite solids, and thin liquids marked by initiation of the pharyngeal swallow with hyolaryngeal excursion upon palpation without evidence of impaired airway protection.

## 2023-10-10 NOTE — DIETITIAN INITIAL EVALUATION ADULT - PERTINENT LABORATORY DATA
10-09    132<L>  |  98  |  14  ----------------------------<  182<H>  4.5   |  20<L>  |  1.04    Ca    8.9      09 Oct 2023 07:09  Phos  3.0     10-09  Mg     1.80     10-09    TPro  6.5  /  Alb  3.7  /  TBili  0.6  /  DBili  x   /  AST  12  /  ALT  12  /  AlkPhos  81  10-09  POCT Blood Glucose.: 81 mg/dL (10-10-23 @ 08:40)  A1C with Estimated Average Glucose Result: 6.5 % (10-08-23 @ 10:30)

## 2023-10-10 NOTE — PROGRESS NOTE ADULT - PROBLEM SELECTOR PLAN 2
S/p vanc and zosyn and 1L NS in ED. Prior hx of UTI with citrobacter sensitive to zosyn    Plan:  c/w zosyn (10/7-  f/u MRSA pcr and RVP  f/u blood cx and urine cx  monitor bladder scans q8 for urinary retention S/p vanc and zosyn and 1L NS in ED. Prior hx of UTI with citrobacter sensitive to zosyn. Blood cx NGTD. MRSA pcr neg. RVP neg. Urine cx with providencia stuartii, sensitive to ctx      Plan:  c/w zosyn (10/7-10/10), consider switching to ctx on 10/11 for 7 D total course  monitor bladder scans q8 for urinary retention  Miralax and senna for bowel regimen

## 2023-10-11 NOTE — DISCHARGE NOTE PROVIDER - HOSPITAL COURSE
HPI:  77 yo M with a history of Type 2 DM, CAD s/p CABG, HTN, CVA, dementia (baseline bedbound, speaks few words), who was BIBEMS after he was found unresponsive at [facility] with the following vital signs: /51 HR  67  O2 Sat 95% on RA, POCT glucose 57. The pt was given Glucagon x2, D5.    In the ED, vital signs were T 95, HR 58, BP 99/42, RR 22, Sa O2 98% on 2L  Labs notable for leukocytosis to 21.53, H/H 10.5/30.7, Na 129, K 5.6, AG 18, , Na 129, K 5.6, Gluc 235  VBG 7.26, PCO2 56, PO2 48, HCO3 25, Lactate 2.3. UA: cloudy, +nitrites, small leuk esterase, 67 wbc. many bacteria, glucose 500   EKG with sinus bradycardia. CXR: clear lungs. CT Head: Atrophy and chronic microvascular ischemic changes. 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 No acute intracranial pathology.  In the ED, started on Vanc x1 dose and Zosyn and received 1L NS. Blood cx and urine cx were collected. BB and BP meds were held. Pt's mental status improved to A&Ox3. The pt passed dysphagia screen and was seen by speech and swallow, so diet was advanced to puree with thin liquids. The pt was started on Lantus 15 units, which was then decreased to 12 units due to concern for hypoglycemia. The pt was continued on zosyn and urine cultures grew Providencia Stuartii, which the pt has grown before and was sensitive to Ceftriaxone, so the pt was transitioned to ceftriaxone on 10/11 and continued treatment to complete a 7 day course. PT evaluated and recommended DC back to [facility].     On discharge, pt was medically stable for discharge to [facility].       HPI:  77 yo M with a history of Type 2 DM, CAD s/p CABG, HTN, CVA, dementia (baseline bedbound, speaks few words), who was BIBEMS after he was found unresponsive at [facility] with the following vital signs: /51 HR  67  O2 Sat 95% on RA, POCT glucose 57. The pt was given Glucagon x2, D5.    In the ED, vital signs were T 95, HR 58, BP 99/42, RR 22, Sa O2 98% on 2L  Labs notable for leukocytosis to 21.53, H/H 10.5/30.7, Na 129, K 5.6, AG 18, , Na 129, K 5.6, Gluc 235  VBG 7.26, PCO2 56, PO2 48, HCO3 25, Lactate 2.3. UA: cloudy, +nitrites, small leuk esterase, 67 wbc. many bacteria, glucose 500   EKG with sinus bradycardia. CXR: clear lungs. CT Head: Atrophy and chronic microvascular ischemic changes. 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 No acute intracranial pathology.  In the ED, started on Vanc x1 dose and Zosyn and received 1L NS. Blood cx and urine cx were collected. BB and BP meds were held. Pt's mental status improved to A&Ox3. The pt passed dysphagia screen and was seen by speech and swallow, so diet was advanced to puree with thin liquids. The pt was started on Lantus 15 units, which was then decreased to 12 units due to concern for hypoglycemia. The pt is to be discharged on 12 units of Lantus only. The pt was continued on zosyn from 10/8 and urine cultures grew Providencia Stuartii, which the pt has grown before and was sensitive to Ceftriaxone, so the pt was transitioned to ceftriaxone on 10/11 and will continue treatment to complete a 7 day course on 10/14 with cefpodoxime for the remaining 3 days. PT evaluated and recommended DC back to [facility].     On discharge, pt was medically stable for discharge to [facility].    Discharge diagnosis: Sepsis 2/2 UTI, hypoglycemia. likely Beta blocker toxicity       HPI:  77 yo M with a history of Type 2 DM, CAD s/p CABG, HTN, CVA, dementia (baseline bedbound, speaks few words), who was BIBEMS after he was found unresponsive at [facility] with the following vital signs: /51 HR  67  O2 Sat 95% on RA, POCT glucose 57. The pt was given Glucagon x2, D5.    In the ED, vital signs were T 95, HR 58, BP 99/42, RR 22, Sa O2 98% on 2L  Labs notable for leukocytosis to 21.53, H/H 10.5/30.7, Na 129, K 5.6, AG 18, , Na 129, K 5.6, Gluc 235  VBG 7.26, PCO2 56, PO2 48, HCO3 25, Lactate 2.3. UA: cloudy, +nitrites, small leuk esterase, 67 wbc. many bacteria, glucose 500   EKG with sinus bradycardia. CXR: clear lungs. CT Head: Atrophy and chronic microvascular ischemic changes. 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 No acute intracranial pathology.  In the ED, started on Vanc x1 dose and Zosyn and received 1L NS. Blood cx and urine cx were collected. BB and BP meds were held. Pt's mental status improved to A&Ox3. The pt passed dysphagia screen and was seen by speech and swallow, so diet was advanced to puree with thin liquids. The pt was started on Lantus 15 units, which was then decreased to 12 units due to concern for hypoglycemia. The pt is to be discharged on 12 units of Lantus only. The pt was continued on zosyn from 10/8 and urine cultures grew Providencia Stuartii, which the pt has grown before and was sensitive to Ceftriaxone, so the pt was transitioned to ceftriaxone on 10/11 and will continue treatment to complete a 7 day course on 10/14 with cefpodoxime 100 mg BID for the remaining 3 days. Pt to continue mupirocin ointment to complete 5 days for MSSA nares. PT evaluated and recommended DC back to [facility].     On discharge, pt was medically stable for discharge to [facility].    Discharge diagnosis: Sepsis 2/2 UTI, hypoglycemia. likely Beta blocker toxicity       HPI:  77 yo M with a history of Type 2 DM, CAD s/p CABG, HTN, CVA, dementia (baseline bedbound, speaks few words), who was BIBEMS after he was found unresponsive at [facility] with the following vital signs: /51 HR  67  O2 Sat 95% on RA, POCT glucose 57. The pt was given Glucagon x2, D5.    In the ED, vital signs were T 95, HR 58, BP 99/42, RR 22, Sa O2 98% on 2L  Labs notable for leukocytosis to 21.53, H/H 10.5/30.7, Na 129, K 5.6, AG 18, , Na 129, K 5.6, Gluc 235  VBG 7.26, PCO2 56, PO2 48, HCO3 25, Lactate 2.3. UA: cloudy, +nitrites, small leuk esterase, 67 wbc. many bacteria, glucose 500   EKG with sinus bradycardia. CXR: clear lungs. CT Head: Atrophy and chronic microvascular ischemic changes. 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 No acute intracranial pathology.  In the ED, started on Vanc x1 dose and Zosyn and received 1L NS. Blood cx and urine cx were collected. BB and BP meds were held. Pt's mental status improved to A&Ox3. The pt passed dysphagia screen and was seen by speech and swallow, so diet was advanced to puree with thin liquids. The pt was started on Lantus 15 units, which was then decreased to 12 units due to concern for hypoglycemia. The pt is to be discharged on 12 units of Lantus only. The pt was continued on zosyn from 10/8 and urine cultures grew Providencia Stuartii, which the pt has grown before and was sensitive to Ceftriaxone, so the pt was transitioned to ceftriaxone on 10/12 and will continue treatment to complete a 7 day course on 10/14 with cefpodoxime 100 mg BID for the remaining 2 days. Pt to continue mupirocin ointment to complete 5 days for MSSA nares on 10/14 PT evaluated and recommended DC back to [facility].     On discharge, pt was medically stable for discharge to [facility].    Discharge diagnosis: Sepsis 2/2 UTI, hypoglycemia. likely Beta blocker toxicity

## 2023-10-11 NOTE — PROGRESS NOTE ADULT - PROBLEM SELECTOR PLAN 4
Pt with AMS 2/2 hypoglycemia, corrected after glucagon x2 and D5. Home regimen: Levemir 49 U qHS. Lispro 17 U TIDAC. Started on Lantus 15 units QHS and low dose ISS     Plan:  HbA1c 6.5%  FS TIDAC and QHS  ISO AM hypoglycemia, decreased Lantus to 12 units nightly  Low dose ISS with meals and at bedtime

## 2023-10-11 NOTE — DISCHARGE NOTE PROVIDER - NSFOLLOWUPCLINICS_GEN_ALL_ED_FT
[facility] [place]  Internal Medicine  [address] [place] [address]  Phone: [phone]  Fax: [phone]     [facility] [place]  Internal Medicine  [address] [place] [address]  Phone: [phone]  Fax: [phone]    [facility]  Dental  [address] [place] [id number]  Phone: [phone]  Fax:

## 2023-10-11 NOTE — DISCHARGE NOTE PROVIDER - NSDCCPCAREPLAN_GEN_ALL_CORE_FT
PRINCIPAL DISCHARGE DIAGNOSIS  Diagnosis: Severe sepsis  Assessment and Plan of Treatment: You were admitted for a urinary tract infection and found to have low pressure and to be confused because of the infection, because your blood sugar was low, and your electrolytes were abnormal likely due to taking too much insulin and using the medication metoprolol (a beta blocker). You were treated for the infection with IV antibiotics and fluids and your insulin was carefully restarted. You will need to take 12 units of Lantus at bedtime. You will need to complete the remaining days of the 7 day course of antibiotics by taking cefpodoxime for 3 more days. You were found to not need additional blood pressure medications so only take Amlodipine 10 mg daily. Your swallowing function was evaluated after your confusion improved and you were found to safely take a pureed diet with thin liquids.  TO DO: Take cefpodoxime for 3 more days. Take Lantus 12 units at bedtime. Please take Lantus 12 units at bed time. Please stop your beta blocker metoprolol. Please only take the blood pressure medication Amlodipine 10 mg daily and follow up with your primary care doctor. Please also see a dentist for your teeth cleaning and maintenance.      SECONDARY DISCHARGE DIAGNOSES  Diagnosis: Type 2 diabetes mellitus  Assessment and Plan of Treatment: You were on high amounts of insulin at your nursing home facility. You presented to the hospital with low blood sugars and confusion. You were not found to need insulin requirements. On discharge, please only take Lantus 12 units at bedtime.   TO DO: Take 12 units of lantus at bedtime. Monitor your fingersticks before meals and at bedtime and if your blood glucose is <70 please drink some juice and eat food to raise your blood sugar. Please follow up with your primary care doctor within 1-2 weeks of discharge for further monitoring of your blood glucose levels.    Diagnosis: Acute UTI  Assessment and Plan of Treatment: You had a urinary tract infection with the bacteria Providencia Stuartii and were treated with Zosyn. It was found to be sensitive to ceftriaxone and you will be discharged on the oral antibiotic cefpodoxime for 3 more days to complete the 7 day course.   TO DO: Please take cefpodoxime for 3 more days to complete the 7 day course.    Diagnosis: Hyponatremia  Assessment and Plan of Treatment: You take high doses of salt tablets outpatient (noted to be 6 g daily). You were started on 2 g of salt tablets daily while in the hospital. Please take 2 tablets of 1000 mg daily.   TO DO: Please follow up with your primary care doctor to monitor your sodium levels.     PRINCIPAL DISCHARGE DIAGNOSIS  Diagnosis: Severe sepsis  Assessment and Plan of Treatment: You were admitted for a urinary tract infection and found to have low pressure and to be confused because of the infection, because your blood sugar was low, and your electrolytes were abnormal likely due to taking too much insulin and using the medication metoprolol (a beta blocker). You were treated for the infection with IV antibiotics and fluids and your insulin was carefully restarted. You will need to take 12 units of Lantus at bedtime. You will need to complete the remaining days of the 7 day course of antibiotics by taking cefpodoxime for 3 more days. You were found to carry a staph bacteria in your nose and were treated with mupirocin ointment for a total of 5 days. You were found to not need additional blood pressure medications so only take Amlodipine 10 mg daily. Your swallowing function was evaluated after your confusion improved and you were found to safely take a pureed diet with thin liquids.  TO DO: Take cefpodoxime 100 mg twice daily for 3 more days. Take Lantus 12 units at bedtime. Please take Lantus 12 units at bed time. Please STOP your beta blocker metoprolol. Please take your blood pressure medications as instructed on the medications page of the discharge summary and ONLY take the blood pressure medication Amlodipine 10 mg daily and follow up with your primary care doctor. Please apply mupirocin ointment to your nose unil 10/14. Please also see a dentist for your teeth cleaning and maintenance.      SECONDARY DISCHARGE DIAGNOSES  Diagnosis: Type 2 diabetes mellitus  Assessment and Plan of Treatment: You were on high amounts of insulin at your nursing home facility. You presented to the hospital with low blood sugars and confusion. You were not found to need insulin requirements. On discharge, please only take Lantus 12 units at bedtime.   TO DO: Take 12 units of lantus at bedtime. Monitor your fingersticks before meals and at bedtime and if your blood glucose is <70 please drink some juice and eat food to raise your blood sugar. Please follow up with your primary care doctor within 1-2 weeks of discharge for further monitoring of your blood glucose levels.    Diagnosis: Acute UTI  Assessment and Plan of Treatment: You had a urinary tract infection with the bacteria Providencia Stuartii and were treated with Zosyn. It was found to be sensitive to ceftriaxone and you will be discharged on the oral antibiotic cefpodoxime for 3 more days to complete the 7 day course.   TO DO: Please take cefpodoxime for 3 more days to complete the 7 day course.    Diagnosis: Hyponatremia  Assessment and Plan of Treatment: You take high doses of salt tablets outpatient (noted to be 6 g daily). You were started on 2 g of salt tablets daily while in the hospital. Please take 2 tablets of 1000 mg daily.   TO DO: Please follow up with your primary care doctor to monitor your sodium levels.     PRINCIPAL DISCHARGE DIAGNOSIS  Diagnosis: Severe sepsis  Assessment and Plan of Treatment: You were admitted for a urinary tract infection and found to have low pressure and to be confused because of the infection, because your blood sugar was low, and your electrolytes were abnormal likely due to taking too much insulin and using the medication metoprolol (a beta blocker). You were treated for the infection with IV antibiotics and fluids and your insulin was carefully restarted. You will need to take 12 units of Lantus at bedtime. You will need to complete the remaining days of the 7 day course of antibiotics by taking cefpodoxime for 2 more days until 10/14. You were found to carry a staph bacteria in your nose and were treated with mupirocin ointment, continue applying it on your nose for twice daily until 10/14. You were found to not need additional blood pressure medications so only take Amlodipine 10 mg daily. Your swallowing function was evaluated after your confusion improved and you were found to safely take a pureed diet with thin liquids.  TO DO: Take cefpodoxime 100 mg twice daily for 2 more days (end date 10/14). For insulin, please ONLY take Lantus 12 units at bed time. Please STOP your beta blocker metoprolol. Please take your blood pressure medications as instructed on the medications page of the discharge summary and ONLY take the blood pressure medication Amlodipine 10 mg daily and follow up with your primary care doctor. Please apply mupirocin ointment twice daily to your nostrils unil 10/14. Please also see a dentist for your teeth cleaning and maintenance.      SECONDARY DISCHARGE DIAGNOSES  Diagnosis: Type 2 diabetes mellitus  Assessment and Plan of Treatment: You were on high amounts of insulin at your nursing home facility. You presented to the hospital with low blood sugars and confusion. You were not found to need insulin requirements. On discharge, please only take Lantus 12 units at bedtime.   TO DO: Take 12 units of lantus at bedtime ONLY. You were not determined to need mealtime insulin. Monitor your fingersticks before meals and at bedtime and if your blood glucose is <70 please drink some juice and eat food to raise your blood sugar. Please follow up with your primary care doctor within 1-2 weeks of discharge for further monitoring of your blood glucose levels.    Diagnosis: Acute UTI  Assessment and Plan of Treatment: You had a urinary tract infection with the bacteria Providencia Stuartii and were treated with Zosyn. It was found to be sensitive to ceftriaxone and you will be discharged on the oral antibiotic cefpodoxime for 2 more days to complete the 7 day course.   TO DO: Please take cefpodoxime twice daily for 2 more days to complete the 7 day course until 10/14    Diagnosis: Hyponatremia  Assessment and Plan of Treatment: You take high doses of salt tablets outpatient (noted to be 6 g daily). You were started on 2 g of salt tablets daily while in the hospital. Please take 2 tablets of 1000 mg daily.   TO DO: Please follow up with your primary care doctor to monitor your sodium levels.

## 2023-10-11 NOTE — DISCHARGE NOTE PROVIDER - NSDCFUADDAPPT_GEN_ALL_CORE_FT
APPTS ARE READY TO BE MADE: [ ] YES    Best Family or Patient Contact (if needed):    Additional Information about above appointments (if needed):    1: internal medicine  2:   3:     Other comments or requests:    APPTS ARE READY TO BE MADE: [ X] YES    Best Family or Patient Contact (if needed):    Additional Information about above appointments (if needed):    1: internal medicine  2: dental clinic  3:     Other comments or requests:    Epidermal Closure: running locked APPTS ARE READY TO BE MADE: [ X] YES    Best Family or Patient Contact (if needed):    Additional Information about above appointments (if needed):    1: internal medicine  2: dental clinic  3:     Other comments or requests:   Outreached on (10/13, 10/16 and 10/17) which have been unsuccessful. Will await call back from patient/caregiver to coordinate follow up care.

## 2023-10-11 NOTE — DISCHARGE NOTE PROVIDER - NSDCCPTREATMENT_GEN_ALL_CORE_FT
PRINCIPAL PROCEDURE  Procedure: Head CT  Findings and Treatment: FINDINGS:  There is generalized volume loss. Periventricular, subcortical and deep   white matter hypoattenuation is noted, compatible with sequelae of   chronic medical vascular ischemic changes. Bilateral basal ganglia, right   thalamic and left insular lacunar infarcts are noted. This is stable   compared to previous exam. There is no sulcal effacement. There is no   intracranial hemorrhage, extra axial fluid collection, mass effect or   midline shift. There is no acute large vessel territorial infarct. The   skull is intact. The paranasal sinuses and mastoid air cells are clear.  IMPRESSION: Atrophy and chronic microvascular ischemic changes as above. 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 No acute intracranial pathology.      SECONDARY PROCEDURE  Procedure: Chest xray, PA & lateral  Findings and Treatment: FINDINGS:  Lungs: The lungs are clear.  Pleura: No pleural effusion or pneumothorax.  Heart And Mediastinum: Heart size is within normal limits.  Skeleton: There is no acute osseus abnormality. Sternotomy wires.   Cervical hardware.  IMPRESSION:  Clear lungs.

## 2023-10-11 NOTE — PROGRESS NOTE ADULT - PROBLEM SELECTOR PLAN 1
Pt presented with AMS, hypothermia to 95 F and leukocytosis to 21.53 w left shift.  UA positive for nitrites, small leuk esterase, many bacteria,  S/p vanc and zosyn and 1L NS in ED. Prior hx of UTI with organism resistant to CTX, sensitive to zosyn. RVP neg. Blood cx NG. MRSA pcr neg. RVP neg. Lactate downtrended to 2.4 --> 1.2    Plan:  c/w zosyn (10/7-10/10)  PO hydration

## 2023-10-11 NOTE — PROGRESS NOTE ADULT - PROBLEM SELECTOR PLAN 2
S/p vanc and zosyn and 1L NS in ED. Prior hx of UTI with citrobacter sensitive to zosyn. Blood cx NGTD. MRSA pcr neg. RVP neg. Urine cx with providencia stuartii, sensitive to ctx      Plan:  c/w zosyn (10/7-10/10), consider switching to ctx on 10/11 for 7 D total course  monitor bladder scans q8 for urinary retention  Miralax and senna for bowel regimen

## 2023-10-11 NOTE — DISCHARGE NOTE PROVIDER - NSDCMRMEDTOKEN_GEN_ALL_CORE_FT
acetaminophen 325 mg oral tablet: 2 tab(s) orally every 6 hours, As Needed for pain  aspirin 81 mg oral tablet: 1 tab(s) orally once a day  Colace 100 mg oral capsule: 1 cap(s) orally 2 times a day  doxazosin 2 mg oral tablet: 1 tab(s) orally once a day (at bedtime)  enalapril 20 mg oral tablet: 1 tab(s) orally 2 times a day  HumaLOG 100 units/mL subcutaneous solution: 2 Unit(s) if Glucose 151 - 200  4 Unit(s) if Glucose 201 - 250  6 Unit(s) if Glucose 251 - 300  8 Unit(s) if Glucose 301 - 350  10 Unit(s) if Glucose 351 - 400  12 Unit(s) if Glucose Greater Than 400  3 times a day before meals  insulin glargine: 3 unit(s) subcutaneously once a day (at bedtime)  insulin lispro (concentrated) 200 units/mL subcutaneous solution: 0 Unit(s) if Glucose 61 - 250  2 Unit(s) if Glucose 251 - 300  4 Unit(s) if Glucose 301 - 350  6 Unit(s) if Glucose 351 - 400  8 Unit(s) if Glucose Greater Than 400   subcutaneous once a day at bedtime  Insulin Lispro KwikPen 100 units/mL injectable solution: 17 injectable 3 times a day (before meals)  Levemir 100 units/mL subcutaneous solution: 49 unit(s) subcutaneous once a day (at bedtime)  Lipitor 10 mg oral tablet: 1 tab(s) orally once a day  magnesium oxide 400 mg (241.3 mg elemental magnesium) oral tablet: 1 tab(s) orally once a day  Metoprolol Tartrate 25 mg oral tablet: 1 tab(s) orally 2 times a day  NIFEdipine 30 mg oral tablet, extended release: 1 tab(s) orally once a day  pantoprazole 40 mg oral delayed release tablet: 1 tab(s) orally once a day (before a meal)  Senna 8.6 mg oral tablet: 1 tab(s) orally once a day (at bedtime)  Sodium Chloride 1000 mg oral tablet, soluble: 2 tab(s) orally 3 times a day  traZODone 50 mg oral tablet: 0.5 tab(s) orally once a day   acetaminophen 325 mg oral tablet: 2 tab(s) orally every 6 hours, As Needed for pain  aspirin 81 mg oral tablet: 1 tab(s) orally once a day  Colace 100 mg oral capsule: 1 cap(s) orally 2 times a day  enalapril 20 mg oral tablet: 1 tab(s) orally 2 times a day  ipratropium-albuterol 0.5 mg-2.5 mg/3 mL inhalation solution: 3 milliliter(s) inhaled once  ipratropium-albuterol 0.5 mg-2.5 mg/3 mL inhalation solution: 3 milliliter(s) inhaled every 12 hours  Levemir 100 units/mL subcutaneous solution: 12 unit(s) subcutaneous once a day (at bedtime)  Lipitor 10 mg oral tablet: 1 tab(s) orally once a day  mupirocin 2% topical ointment: 1 Apply topically to affected area 2 times a day  pantoprazole 40 mg oral delayed release tablet: 1 tab(s) orally once a day (before a meal)  Senna 8.6 mg oral tablet: 1 tab(s) orally once a day (at bedtime)  Sodium Chloride 1000 mg oral tablet, soluble: 1 tab(s) orally 2 times a day  traZODone 50 mg oral tablet: 0.5 tab(s) orally once a day   acetaminophen 325 mg oral tablet: 2 tab(s) orally every 6 hours, As Needed for pain  aspirin 81 mg oral tablet: 1 tab(s) orally once a day  Colace 100 mg oral capsule: 1 cap(s) orally 2 times a day  ipratropium-albuterol 0.5 mg-2.5 mg/3 mL inhalation solution: 3 milliliter(s) inhaled once  ipratropium-albuterol 0.5 mg-2.5 mg/3 mL inhalation solution: 3 milliliter(s) inhaled every 12 hours  Levemir 100 units/mL subcutaneous solution: 12 unit(s) subcutaneous once a day (at bedtime)  Lipitor 10 mg oral tablet: 1 tab(s) orally once a day  mupirocin 2% topical ointment: 1 Apply topically to affected area 2 times a day  pantoprazole 40 mg oral delayed release tablet: 1 tab(s) orally once a day (before a meal)  Senna 8.6 mg oral tablet: 1 tab(s) orally once a day (at bedtime)  Sodium Chloride 1000 mg oral tablet, soluble: 1 tab(s) orally 2 times a day  traZODone 50 mg oral tablet: 0.5 tab(s) orally once a day   acetaminophen 325 mg oral tablet: 2 tab(s) orally every 6 hours, As Needed for pain  aspirin 81 mg oral tablet: 1 tab(s) orally once a day  Colace 100 mg oral capsule: 1 cap(s) orally 2 times a day  ipratropium-albuterol 0.5 mg-2.5 mg/3 mL inhalation solution: 3 milliliter(s) inhaled once  ipratropium-albuterol 0.5 mg-2.5 mg/3 mL inhalation solution: 3 milliliter(s) inhaled every 12 hours  Levemir 100 units/mL subcutaneous solution: 12 unit(s) subcutaneous once a day (at bedtime)  Lipitor 10 mg oral tablet: 1 tab(s) orally once a day  mupirocin 2% topical ointment: Apply topically to affected area 2 times a day 1 Apply topically to affected area 2 times a day for 2 days  pantoprazole 40 mg oral delayed release tablet: 1 tab(s) orally once a day (before a meal)  Senna 8.6 mg oral tablet: 1 tab(s) orally once a day (at bedtime)  Sodium Chloride 1000 mg oral tablet, soluble: 1 tab(s) orally 2 times a day  traZODone 50 mg oral tablet: 0.5 tab(s) orally once a day

## 2023-10-11 NOTE — PROGRESS NOTE ADULT - TIME BILLING
Time spent on review of vital signs, labs, prior documentation. Patient interviewed and examined during this encounter. Plan of care was discussed with patient, and resident team. Encounter documented.

## 2023-10-11 NOTE — PROGRESS NOTE ADULT - PROBLEM SELECTOR PLAN 3
Multifactorial due to sepsis, hypoglycemia, electrolyte derangements and  bradycardia iso possible beta blocker toxicity. Hypoglycemia resolved after glucagon x2 and D5. Mental status now improved A&Ox2. Hyperkalemia to 5.6 wo EKG changes, now resolved    Plan:  Passed bedside dysphagia screen- Pureed diet with mildly thickened liquids, pending s/s eval today  Hold beta blocker metoprolol tartrate 25 mg BID  Hold antihypertensives, resume as BP tolerates

## 2023-10-12 NOTE — PROGRESS NOTE ADULT - PROBLEM SELECTOR PLAN 2
S/p vanc and zosyn and 1L NS in ED. Prior hx of UTI with citrobacter sensitive to zosyn. Blood cx NGTD. MRSA pcr neg. RVP neg. Urine cx with providencia stuartii, sensitive to ctx. No retention      Plan:  c/w zosyn (10/7-10/10), transitioned to ctx on 10/11  Will complete outpatient course with cefpodoxime for 7 D total course  Miralax and senna for bowel regimen

## 2023-10-12 NOTE — PROGRESS NOTE ADULT - PROBLEM SELECTOR PLAN 3
Multifactorial due to sepsis, hypoglycemia, electrolyte derangements and  bradycardia iso possible beta blocker toxicity. Hypoglycemia resolved after glucagon x2 and D5. Mental status now improved A&Ox2. Hyperkalemia to 5.6 wo EKG changes, now resolved    Plan:  Passed bedside dysphagia screen- Pureed diet with mildly thickened liquids, pending s/s eval today  Hold beta blocker metoprolol tartrate 25 mg BID  Hold antihypertensives, resume as BP tolerates Olumiant Counseling: I discussed with the patient the risks of Olumiant therapy including but not limited to upper respiratory tract infections, shingles, cold sores, and nausea. Live vaccines should be avoided.  This medication has been linked to serious infections; higher rate of mortality; malignancy and lymphoproliferative disorders; major adverse cardiovascular events; thrombosis; gastrointestinal perforations; neutropenia; lymphopenia; anemia; liver enzyme elevations; and lipid elevations.

## 2023-10-12 NOTE — DISCHARGE NOTE NURSING/CASE MANAGEMENT/SOCIAL WORK - NSDCPEFALRISK_GEN_ALL_CORE
For information on Fall & Injury Prevention, visit: [email or web address]  [email or web address] OR  https://www.cdc.gov/steadi/patient.html

## 2023-10-12 NOTE — PROGRESS NOTE ADULT - ATTENDING COMMENTS
78M with PMH of dementia, vertebro-basilar artery syndrome, HTN, HLD, IDDM, CAD s/p CABG, TIA/CVA, BPH, osteoarthritis BIBEMS from [facility] for unresponsiveness. Per ED documentation, reportedly patient was found unresponsive for BG 57, given glucagon x 2 and D5. Pt a/w severe sepsis 2/2 UTI, metabolic encephalopathy, hyperkalemia, and hyponatremia. Mental status now improved. Alert and oriented x 2-3.     #Severe sepsis 2/2 UTI   -UA positive   -S/p Vanc/zosyn in ED   -Ucx this admission w/ Providencia stuartii. Sensitive to bactrim and cefpodoxime. Once discharged will complete course w/ cefpodoxime.  -MRSA PCR negative   -lactate 1.2    #Metabolic encephalopathy   -Multifactorial due to sepsis, electrolyte derangements, and hypoglycemia   -CTH with old lacunar infarcts, no acute findings   -Now improving, A&Ox2-3.     #Hypoglycemia   -Pt on Levemir 49U qHS and admelog 17U qAC at NH. Possibly hypoglycemic in setting of sepsis and insulin given while pt not eating. Also concern for BB toxicity (sinus bradycardia, and hyperkalemia on presentation) now s/p glucagon.   -c/w lantus to 12 units, ISS    #Hyperkalemia   -K 5.6 on presentation without ECG changes  -resolved CTM     #Hyponatremia  -Na 129 on presentation (per HIROLANDO, Na 130 on 10/5). Chronic hyponatremia.   -Per [facility], pt on NaCl tabs 2g TID, unclear why. ?SIADH  -Can restart NaCl 2g QD   -CTM.     #Rhinovirus  - supportive care    Patient medically optimized for discharge to rehab    35 minutes spent on discharge planning
78M with PMH of dementia, vertebro-basilar artery syndrome, HTN, HLD, IDDM, CAD s/p CABG, TIA/CVA, BPH, osteoarthritis BIBEMS from [facility] for unresponsiveness. Per ED documentation, reportedly patient was found unresponsive for BG 57, given glucagon x 2 and D5. Pt a/w severe sepsis 2/2 UTI, metabolic encephalopathy, hyperkalemia, and hyponatremia. Mental status now improved. Alert and oriented x 2.     #Severe sepsis 2/2 UTI   -UA positive   -S/p Vanc/zosyn in ED   -Previous Ucx with klebsiella resistant to CTX. C/w Zosyn for now.   -RVP/MRSA PCR ordered   -trend lactate to clear   -F/u Bcx/Ucx --> Ucx >100k gram negative rods   -Bladder scan q8 to monitor for urinary retention     #Metabolic encephalopathy   -Multifactorial due to sepsis, electrolyte derangements, and hypoglycemia   -CTH with old lacunar infarcts, no acute findings   -Now improving, A&Ox2. Will need collateral regarding baseline mental status.   -diet now resumed given improvement of mental status     #Hypoglycemia   -Pt on Levemir 49U qHS and admelog 17U qAC at NH. Possibly hypoglycemic in setting of sepsis and insulin given while pt not eating. Also concern for BB toxicity (sinus bradycardia, and hyperkalemia on presentation) now s/p glucagon.   -continue lantus 15 units and ISS     #Hyperkalemia   -K 5.6 on presentation without ECG changes  -resolved CTM     #Hyponatremia  -Na 129 on presentation (per VERONICA, Na 130 on 10/5). Chronic hyponatremia.   -Per [facility], pt on NaCl tabs 2g TID, unclear why. ?SIADH  -Can restart NaCl 2g QD   -CTM     rest of plan as above
78M with PMH of dementia, vertebro-basilar artery syndrome, HTN, HLD, IDDM, CAD s/p CABG, TIA/CVA, BPH, osteoarthritis BIBEMS from [facility] for unresponsiveness. Per ED documentation, reportedly patient was found unresponsive for BG 57, given glucagon x 2 and D5. Pt a/w severe sepsis 2/2 UTI, metabolic encephalopathy, hyperkalemia, and hyponatremia. Mental status now improved. Alert and oriented x 2-3.     #Severe sepsis 2/2 UTI   -UA positive   -S/p Vanc/zosyn in ED   -Previous Ucx with klebsiella resistant to CTX. Ucx this admission w/ Providencia stuartii. Changing abx to cefepime.   -MRSA PCR negative   -lactate 1.2    #Metabolic encephalopathy   -Multifactorial due to sepsis, electrolyte derangements, and hypoglycemia   -CTH with old lacunar infarcts, no acute findings   -Now improving, A&Ox2-3. Will need collateral regarding baseline mental status.   -diet now resumed given improvement of mental status     #Hypoglycemia   -Pt on Levemir 49U qHS and admelog 17U qAC at NH. Possibly hypoglycemic in setting of sepsis and insulin given while pt not eating. Also concern for BB toxicity (sinus bradycardia, and hyperkalemia on presentation) now s/p glucagon.   -reducing lantus to 12 units, ISS    #Hyperkalemia   -K 5.6 on presentation without ECG changes  -resolved CTM     #Hyponatremia  -Na 129 on presentation (per VERONICA, Na 130 on 10/5). Chronic hyponatremia.   -Per [facility], pt on NaCl tabs 2g TID, unclear why. ?SIADH  -Can restart NaCl 2g QD   -CTM
78M with PMH of dementia, vertebro-basilar artery syndrome, HTN, HLD, IDDM, CAD s/p CABG, TIA/CVA, BPH, osteoarthritis BIBEMS from [facility] for unresponsiveness. Per ED documentation, reportedly patient was found unresponsive for BG 57, given glucagon x 2 and D5. Pt a/w severe sepsis 2/2 UTI, metabolic encephalopathy, hyperkalemia, and hyponatremia. Mental status now improved. Alert and oriented x 2-3.     #Severe sepsis 2/2 UTI   -UA positive   -S/p Vanc/zosyn in ED   -Ucx this admission w/ Providencia stuartii. Sensitive to bactrim and cefpodoxime. Once discharged will complete course w/ cefpodoxime.  -MRSA PCR negative   -lactate 1.2    #Metabolic encephalopathy   -Multifactorial due to sepsis, electrolyte derangements, and hypoglycemia   -CTH with old lacunar infarcts, no acute findings   -Now improving, A&Ox2-3.     #Hypoglycemia   -Pt on Levemir 49U qHS and admelog 17U qAC at NH. Possibly hypoglycemic in setting of sepsis and insulin given while pt not eating. Also concern for BB toxicity (sinus bradycardia, and hyperkalemia on presentation) now s/p glucagon.   -reducing lantus to 12 units, ISS    #Hyperkalemia   -K 5.6 on presentation without ECG changes  -resolved CTM     #Hyponatremia  -Na 129 on presentation (per [facility]IE, Na 130 on 10/5). Chronic hyponatremia.   -Per [facility], pt on NaCl tabs 2g TID, unclear why. ?SIADH  -Can restart NaCl 2g QD   -CTM.     Medically optimized waiting acceptance back to NH

## 2023-10-12 NOTE — PROGRESS NOTE ADULT - SUBJECTIVE AND OBJECTIVE BOX
Patient is a 78y old  Male who presents with a chief complaint of Hypothermia, r/o sepsis (11 Oct 2023 08:16)      SUBJECTIVE / OVERNIGHT EVENTS: Patient seen and examined at bedside. No acute events overnight. Pt denies fevers, chills, chest pain, abdominal pain, n/v/d.    MEDICATIONS  (STANDING):  albuterol/ipratropium for Nebulization 3 milliLiter(s) Nebulizer every 12 hours  albuterol/ipratropium for Nebulization. 3 milliLiter(s) Nebulizer once  amLODIPine   Tablet 10 milliGRAM(s) Oral daily  aspirin  chewable 81 milliGRAM(s) Oral daily  atorvastatin 10 milliGRAM(s) Oral at bedtime  cefTRIAXone   IVPB 1000 milliGRAM(s) IV Intermittent every 24 hours  dextrose 50% Injectable 25 Gram(s) IV Push once  dextrose 50% Injectable 25 Gram(s) IV Push once  dextrose 50% Injectable 12.5 Gram(s) IV Push once  dextrose Oral Gel 15 Gram(s) Oral once  enoxaparin Injectable 40 milliGRAM(s) SubCutaneous every 24 hours  glucagon  Injectable 1 milliGRAM(s) IntraMuscular once  insulin glargine Injectable (LANTUS) 12 Unit(s) SubCutaneous at bedtime  insulin lispro (ADMELOG) corrective regimen sliding scale   SubCutaneous three times a day before meals  insulin lispro (ADMELOG) corrective regimen sliding scale   SubCutaneous at bedtime  mupirocin 2% Ointment 1 Application(s) Topical two times a day  pantoprazole  Injectable 40 milliGRAM(s) IV Push daily  polyethylene glycol 3350 17 Gram(s) Oral daily  senna 2 Tablet(s) Oral at bedtime  sodium chloride 2 Gram(s) Oral daily  sodium chloride 0.9%. 1000 milliLiter(s) (100 mL/Hr) IV Continuous <Continuous>    MEDICATIONS  (PRN):  acetaminophen     Tablet .. 650 milliGRAM(s) Oral every 6 hours PRN Temp greater or equal to 38C (100.4F)  aluminum hydroxide/magnesium hydroxide/simethicone Suspension 30 milliLiter(s) Oral every 4 hours PRN Dyspepsia  melatonin 3 milliGRAM(s) Oral at bedtime PRN Insomnia  ondansetron Injectable 4 milliGRAM(s) IV Push every 8 hours PRN Nausea and/or Vomiting      Vital Signs Last 24 Hrs  T(C): 36.9 (12 Oct 2023 05:06), Max: 37.1 (11 Oct 2023 21:13)  T(F): 98.5 (12 Oct 2023 05:06), Max: 98.8 (11 Oct 2023 21:13)  HR: 78 (12 Oct 2023 05:06) (78 - 88)  BP: 143/66 (12 Oct 2023 05:06) (132/66 - 143/66)  BP(mean): --  RR: 17 (12 Oct 2023 05:06) (17 - 17)  SpO2: 98% (12 Oct 2023 05:06) (97% - 98%)    Parameters below as of 12 Oct 2023 05:06  Patient On (Oxygen Delivery Method): room air      CAPILLARY BLOOD GLUCOSE      POCT Blood Glucose.: 163 mg/dL (11 Oct 2023 21:32)  POCT Blood Glucose.: 143 mg/dL (11 Oct 2023 17:50)  POCT Blood Glucose.: 147 mg/dL (11 Oct 2023 12:30)  POCT Blood Glucose.: 115 mg/dL (11 Oct 2023 08:58)    I&O's Summary    11 Oct 2023 07:01  -  12 Oct 2023 07:00  --------------------------------------------------------  IN: 0 mL / OUT: 1100 mL / NET: -1100 mL        PHYSICAL EXAM:  GENERAL: NAD, resting comfortably in bed  HEAD:  Atraumatic, Normocephalic  EYES: EOMI, PERRLA, conjunctiva and sclera clear  NECK: Supple, No JVD  CHEST/LUNG: Clear to auscultation bilaterally; No wheeze  HEART: Regular rate and rhythm; No murmurs, rubs, or gallops  ABDOMEN: Soft, Nontender, Nondistended; Bowel sounds present  EXTREMITIES:  2+ Peripheral Pulses, No clubbing, cyanosis, or edema  PSYCH: AAOx3  NEUROLOGY: non-focal  SKIN: No rashes or lesions    LABS:                        10.2   11.34 )-----------( 297      ( 10 Oct 2023 11:25 )             30.1     10-10    133<L>  |  99  |  8   ----------------------------<  165<H>  4.2   |  25  |  0.96    Ca    9.3      10 Oct 2023 11:25  Phos  2.8     10-10  Mg     1.50     10-10            Urinalysis Basic - ( 10 Oct 2023 11:25 )    Color: x / Appearance: x / SG: x / pH: x  Gluc: 165 mg/dL / Ketone: x  / Bili: x / Urobili: x   Blood: x / Protein: x / Nitrite: x   Leuk Esterase: x / RBC: x / WBC x   Sq Epi: x / Non Sq Epi: x / Bacteria: x        RADIOLOGY & ADDITIONAL TESTS:    Imaging Personally Reviewed:    Consultant(s) Notes Reviewed:      Care Discussed with Consultants/Other Providers:    Tennille Burt MD, Internal Medicine Resident    
Patient is a 78y old  Male who presents with a chief complaint of Hypothermia, r/o sepsis (08 Oct 2023 07:29)      SUBJECTIVE / OVERNIGHT EVENTS: Patient seen and examined at bedside. No acute events overnight. Pt denies fevers, chills, chest pain, abdominal pain, n/v/d.    MEDICATIONS  (STANDING):  aspirin  chewable 81 milliGRAM(s) Oral daily  atorvastatin 10 milliGRAM(s) Oral at bedtime  dextrose 50% Injectable 25 Gram(s) IV Push once  dextrose 50% Injectable 25 Gram(s) IV Push once  dextrose 50% Injectable 12.5 Gram(s) IV Push once  dextrose Oral Gel 15 Gram(s) Oral once  enoxaparin Injectable 40 milliGRAM(s) SubCutaneous every 24 hours  glucagon  Injectable 1 milliGRAM(s) IntraMuscular once  insulin glargine Injectable (LANTUS) 15 Unit(s) SubCutaneous at bedtime  insulin lispro (ADMELOG) corrective regimen sliding scale   SubCutaneous at bedtime  insulin lispro (ADMELOG) corrective regimen sliding scale   SubCutaneous three times a day before meals  pantoprazole  Injectable 40 milliGRAM(s) IV Push daily  piperacillin/tazobactam IVPB.. 3.375 Gram(s) IV Intermittent every 8 hours  sodium chloride 0.9%. 1000 milliLiter(s) (100 mL/Hr) IV Continuous <Continuous>    MEDICATIONS  (PRN):  acetaminophen     Tablet .. 650 milliGRAM(s) Oral every 6 hours PRN Temp greater or equal to 38C (100.4F)  aluminum hydroxide/magnesium hydroxide/simethicone Suspension 30 milliLiter(s) Oral every 4 hours PRN Dyspepsia  melatonin 3 milliGRAM(s) Oral at bedtime PRN Insomnia  ondansetron Injectable 4 milliGRAM(s) IV Push every 8 hours PRN Nausea and/or Vomiting      Vital Signs Last 24 Hrs  T(C): 36.6 (09 Oct 2023 05:28), Max: 37.1 (08 Oct 2023 16:48)  T(F): 97.8 (09 Oct 2023 05:28), Max: 98.7 (08 Oct 2023 16:48)  HR: 73 (09 Oct 2023 05:28) (71 - 98)  BP: 129/55 (09 Oct 2023 05:28) (115/59 - 140/78)  BP(mean): --  RR: 16 (09 Oct 2023 05:28) (16 - 18)  SpO2: 100% (09 Oct 2023 05:28) (99% - 100%)    Parameters below as of 08 Oct 2023 20:00  Patient On (Oxygen Delivery Method): room air      CAPILLARY BLOOD GLUCOSE      POCT Blood Glucose.: 193 mg/dL (08 Oct 2023 21:28)  POCT Blood Glucose.: 189 mg/dL (08 Oct 2023 20:12)  POCT Blood Glucose.: 136 mg/dL (08 Oct 2023 17:22)  POCT Blood Glucose.: 169 mg/dL (08 Oct 2023 12:45)  POCT Blood Glucose.: 163 mg/dL (08 Oct 2023 09:32)    I&O's Summary    08 Oct 2023 07:01  -  09 Oct 2023 07:00  --------------------------------------------------------  IN: 400 mL / OUT: 0 mL / NET: 400 mL        PHYSICAL EXAM:  GENERAL: NAD, resting comfortably in bed  HEAD:  Atraumatic, Normocephalic  EYES: EOMI, PERRLA, conjunctiva and sclera clear  NECK: Supple, No JVD  CHEST/LUNG: Clear to auscultation bilaterally; No wheeze  HEART: Regular rate and rhythm; No murmurs, rubs, or gallops  ABDOMEN: Soft, Nontender, Nondistended; Bowel sounds present  EXTREMITIES:  2+ Peripheral Pulses, No clubbing, cyanosis, or edema  PSYCH: AAOx3  NEUROLOGY: non-focal  SKIN: No rashes or lesions    LABS:                        10.1   15.35 )-----------( 315      ( 08 Oct 2023 08:37 )             29.9     10-08    129<L>  |  94<L>  |  20  ----------------------------<  136<H>  4.7   |  20<L>  |  1.09    Ca    8.5      08 Oct 2023 08:37  Phos  3.8     10-08  Mg     1.50     10-08    TPro  6.8  /  Alb  3.8  /  TBili  0.5  /  DBili  x   /  AST  12  /  ALT  12  /  AlkPhos  76  10-08    PT/INR - ( 08 Oct 2023 00:21 )   PT: 11.1 sec;   INR: 0.98 ratio         PTT - ( 08 Oct 2023 00:21 )  PTT:26.0 sec      Urinalysis Basic - ( 08 Oct 2023 08:37 )    Color: x / Appearance: x / SG: x / pH: x  Gluc: 136 mg/dL / Ketone: x  / Bili: x / Urobili: x   Blood: x / Protein: x / Nitrite: x   Leuk Esterase: x / RBC: x / WBC x   Sq Epi: x / Non Sq Epi: x / Bacteria: x        RADIOLOGY & ADDITIONAL TESTS:    Imaging Personally Reviewed:    Consultant(s) Notes Reviewed:      Care Discussed with Consultants/Other Providers:    Tennille Burt MD, Internal Medicine Resident    
Patient is a 78y old  Male who presents with a chief complaint of Hypothermia, r/o sepsis (09 Oct 2023 07:39)      SUBJECTIVE / OVERNIGHT EVENTS: Patient seen and examined at bedside. No acute events overnight. Pt denies fevers, chills, chest pain, abdominal pain, n/v/d.    MEDICATIONS  (STANDING):  aspirin  chewable 81 milliGRAM(s) Oral daily  atorvastatin 10 milliGRAM(s) Oral at bedtime  dextrose 50% Injectable 12.5 Gram(s) IV Push once  dextrose 50% Injectable 25 Gram(s) IV Push once  dextrose 50% Injectable 25 Gram(s) IV Push once  dextrose Oral Gel 15 Gram(s) Oral once  enoxaparin Injectable 40 milliGRAM(s) SubCutaneous every 24 hours  glucagon  Injectable 1 milliGRAM(s) IntraMuscular once  insulin glargine Injectable (LANTUS) 15 Unit(s) SubCutaneous at bedtime  insulin lispro (ADMELOG) corrective regimen sliding scale   SubCutaneous at bedtime  insulin lispro (ADMELOG) corrective regimen sliding scale   SubCutaneous three times a day before meals  mupirocin 2% Ointment 1 Application(s) Topical two times a day  pantoprazole  Injectable 40 milliGRAM(s) IV Push daily  piperacillin/tazobactam IVPB.. 3.375 Gram(s) IV Intermittent every 8 hours  sodium chloride 2 Gram(s) Oral daily  sodium chloride 0.9%. 1000 milliLiter(s) (100 mL/Hr) IV Continuous <Continuous>  sodium chloride 0.9%. 1000 milliLiter(s) (100 mL/Hr) IV Continuous <Continuous>    MEDICATIONS  (PRN):  acetaminophen     Tablet .. 650 milliGRAM(s) Oral every 6 hours PRN Temp greater or equal to 38C (100.4F)  aluminum hydroxide/magnesium hydroxide/simethicone Suspension 30 milliLiter(s) Oral every 4 hours PRN Dyspepsia  melatonin 3 milliGRAM(s) Oral at bedtime PRN Insomnia  ondansetron Injectable 4 milliGRAM(s) IV Push every 8 hours PRN Nausea and/or Vomiting      Vital Signs Last 24 Hrs  T(C): 36.7 (10 Oct 2023 05:22), Max: 36.8 (09 Oct 2023 21:37)  T(F): 98.1 (10 Oct 2023 05:22), Max: 98.2 (09 Oct 2023 21:37)  HR: 87 (10 Oct 2023 05:22) (76 - 87)  BP: 143/90 (10 Oct 2023 05:22) (134/63 - 143/90)  BP(mean): --  RR: 18 (10 Oct 2023 05:22) (17 - 18)  SpO2: 100% (10 Oct 2023 05:22) (98% - 100%)    Parameters below as of 10 Oct 2023 05:22  Patient On (Oxygen Delivery Method): room air      CAPILLARY BLOOD GLUCOSE      POCT Blood Glucose.: 140 mg/dL (09 Oct 2023 21:34)  POCT Blood Glucose.: 151 mg/dL (09 Oct 2023 17:48)  POCT Blood Glucose.: 191 mg/dL (09 Oct 2023 12:11)  POCT Blood Glucose.: 158 mg/dL (09 Oct 2023 08:54)    I&O's Summary    09 Oct 2023 07:01  -  10 Oct 2023 07:00  --------------------------------------------------------  IN: 1000 mL / OUT: 0 mL / NET: 1000 mL        PHYSICAL EXAM:  GENERAL: NAD, resting comfortably in bed  HEAD:  Atraumatic, Normocephalic  EYES: EOMI, PERRLA, conjunctiva and sclera clear  NECK: Supple, No JVD  CHEST/LUNG: Clear to auscultation bilaterally; No wheeze  HEART: Regular rate and rhythm; No murmurs, rubs, or gallops  ABDOMEN: Soft, Nontender, Nondistended; Bowel sounds present  EXTREMITIES:  2+ Peripheral Pulses, No clubbing, cyanosis, or edema  PSYCH: AAOx3  NEUROLOGY: non-focal  SKIN: No rashes or lesions    LABS:                        9.2    11.94 )-----------( 313      ( 09 Oct 2023 07:09 )             27.6     10-09    132<L>  |  98  |  14  ----------------------------<  182<H>  4.5   |  20<L>  |  1.04    Ca    8.9      09 Oct 2023 07:09  Phos  3.0     10-09  Mg     1.80     10-09    TPro  6.5  /  Alb  3.7  /  TBili  0.6  /  DBili  x   /  AST  12  /  ALT  12  /  AlkPhos  81  10-09          Urinalysis Basic - ( 09 Oct 2023 07:09 )    Color: x / Appearance: x / SG: x / pH: x  Gluc: 182 mg/dL / Ketone: x  / Bili: x / Urobili: x   Blood: x / Protein: x / Nitrite: x   Leuk Esterase: x / RBC: x / WBC x   Sq Epi: x / Non Sq Epi: x / Bacteria: x        RADIOLOGY & ADDITIONAL TESTS:    Imaging Personally Reviewed:    Consultant(s) Notes Reviewed:      Care Discussed with Consultants/Other Providers:    Tennille Burt MD, Internal Medicine Resident    
Patient is a 78y old  Male who presents with a chief complaint of Hypothermia, r/o sepsis (11 Oct 2023 05:37)      SUBJECTIVE / OVERNIGHT EVENTS: Patient seen and examined at bedside. No acute events overnight. Pt denies fevers, chills, chest pain, abdominal pain, n/v/d.    MEDICATIONS  (STANDING):  albuterol/ipratropium for Nebulization. 3 milliLiter(s) Nebulizer once  amLODIPine   Tablet 10 milliGRAM(s) Oral daily  aspirin  chewable 81 milliGRAM(s) Oral daily  atorvastatin 10 milliGRAM(s) Oral at bedtime  cefepime   IVPB      dextrose 50% Injectable 25 Gram(s) IV Push once  dextrose 50% Injectable 25 Gram(s) IV Push once  dextrose 50% Injectable 12.5 Gram(s) IV Push once  dextrose Oral Gel 15 Gram(s) Oral once  enoxaparin Injectable 40 milliGRAM(s) SubCutaneous every 24 hours  glucagon  Injectable 1 milliGRAM(s) IntraMuscular once  insulin glargine Injectable (LANTUS) 12 Unit(s) SubCutaneous at bedtime  insulin lispro (ADMELOG) corrective regimen sliding scale   SubCutaneous three times a day before meals  insulin lispro (ADMELOG) corrective regimen sliding scale   SubCutaneous at bedtime  mupirocin 2% Ointment 1 Application(s) Topical two times a day  pantoprazole  Injectable 40 milliGRAM(s) IV Push daily  polyethylene glycol 3350 17 Gram(s) Oral daily  senna 2 Tablet(s) Oral at bedtime  sodium chloride 2 Gram(s) Oral daily  sodium chloride 0.9%. 1000 milliLiter(s) (100 mL/Hr) IV Continuous <Continuous>    MEDICATIONS  (PRN):  acetaminophen     Tablet .. 650 milliGRAM(s) Oral every 6 hours PRN Temp greater or equal to 38C (100.4F)  aluminum hydroxide/magnesium hydroxide/simethicone Suspension 30 milliLiter(s) Oral every 4 hours PRN Dyspepsia  melatonin 3 milliGRAM(s) Oral at bedtime PRN Insomnia  ondansetron Injectable 4 milliGRAM(s) IV Push every 8 hours PRN Nausea and/or Vomiting      Vital Signs Last 24 Hrs  T(C): 36.6 (11 Oct 2023 05:33), Max: 37.1 (10 Oct 2023 21:38)  T(F): 97.9 (11 Oct 2023 05:33), Max: 98.7 (10 Oct 2023 21:38)  HR: 99 (11 Oct 2023 05:33) (75 - 99)  BP: 143/62 (11 Oct 2023 05:33) (140/83 - 144/67)  BP(mean): --  RR: 17 (11 Oct 2023 05:33) (17 - 18)  SpO2: 97% (11 Oct 2023 05:33) (97% - 100%)    Parameters below as of 11 Oct 2023 05:33  Patient On (Oxygen Delivery Method): room air      CAPILLARY BLOOD GLUCOSE  161 (10 Oct 2023 21:35)      POCT Blood Glucose.: 161 mg/dL (10 Oct 2023 21:35)  POCT Blood Glucose.: 156 mg/dL (10 Oct 2023 17:58)  POCT Blood Glucose.: 196 mg/dL (10 Oct 2023 12:23)  POCT Blood Glucose.: 81 mg/dL (10 Oct 2023 08:40)    I&O's Summary    10 Oct 2023 07:01  -  11 Oct 2023 07:00  --------------------------------------------------------  IN: 0 mL / OUT: 400 mL / NET: -400 mL        PHYSICAL EXAM:  GENERAL: NAD, resting comfortably in bed  HEAD:  Atraumatic, Normocephalic  EYES: EOMI, PERRLA, conjunctiva and sclera clear  NECK: Supple, No JVD  CHEST/LUNG: Clear to auscultation bilaterally; No wheeze, transmitted upper respiratory sounds  HEART: Regular rate and rhythm; No murmurs, rubs, or gallops  ABDOMEN: Soft, Nontender, Nondistended; Bowel sounds present  EXTREMITIES:  2+ Peripheral Pulses, No clubbing, cyanosis, or edema, contracted, unable to move b/l LE spontaneously  PSYCH: AAOx3  NEUROLOGY: non-focal  SKIN: No rashes or lesions    LABS:                        10.2   11.34 )-----------( 297      ( 10 Oct 2023 11:25 )             30.1     10-10    133<L>  |  99  |  8   ----------------------------<  165<H>  4.2   |  25  |  0.96    Ca    9.3      10 Oct 2023 11:25  Phos  2.8     10-10  Mg     1.50     10-10            Urinalysis Basic - ( 10 Oct 2023 11:25 )    Color: x / Appearance: x / SG: x / pH: x  Gluc: 165 mg/dL / Ketone: x  / Bili: x / Urobili: x   Blood: x / Protein: x / Nitrite: x   Leuk Esterase: x / RBC: x / WBC x   Sq Epi: x / Non Sq Epi: x / Bacteria: x        RADIOLOGY & ADDITIONAL TESTS:    Imaging Personally Reviewed:    Consultant(s) Notes Reviewed:      Care Discussed with Consultants/Other Providers:    Tennille Burt MD, Internal Medicine Resident

## 2023-10-12 NOTE — PROGRESS NOTE ADULT - ASSESSMENT
79 yo M with a history of Type 2 DM, CAD s/p CABG, HTN, CVA, dementia (baseline bedbound, speaks few words), who was BIBEMS after he was found unresponsive at [facility] with hypothermia and hypoglycemia which corrected s/p Glucagon x2, D5. Labs notable for leukocytosis, elevated lactate, hyponatremia 129, +UA with nitrites and leuk esterase, admitted for severe sepsis  2/2 UTI and metabolic encephalopathy iso of sepsis, hypoglycemia, hyperkalemia, hyponatremia, and likely beta blocker toxicity. Mental status now improved to A&Ox2
77 yo M with a history of Type 2 DM, CAD s/p CABG, HTN, CVA, dementia (baseline bedbound, speaks few words), who was BIBEMS after he was found unresponsive at [facility] with hypothermia and hypoglycemia which corrected s/p Glucagon x2, D5. Labs notable for leukocytosis, elevated lactate, hyponatremia 129, +UA with nitrites and leuk esterase, admitted for severe sepsis  2/2 UTI and metabolic encephalopathy iso of sepsis, hypoglycemia, hyperkalemia, hyponatremia, and likely beta blocker toxicity. Mental status now improved to A&Ox2
77 yo M with a history of Type 2 DM, CAD s/p CABG, HTN, CVA, dementia (baseline bedbound, speaks few words), who was BIBEMS after he was found unresponsive at [facility] with hypothermia and hypoglycemia which corrected s/p Glucagon x2, D5. Labs notable for leukocytosis, elevated lactate, hyponatremia 129, +UA with nitrites and leuk esterase, admitted for severe sepsis  2/2 UTI and metabolic encephalopathy iso of sepsis, hypoglycemia, hyperkalemia, hyponatremia, and likely beta blocker toxicity. Mental status now improved to A&Ox2
77 yo M with a history of Type 2 DM, CAD s/p CABG, HTN, CVA, dementia (baseline bedbound, speaks few words), who was BIBEMS after he was found unresponsive at [facility] with hypothermia and hypoglycemia which corrected s/p Glucagon x2, D5. Labs notable for leukocytosis, elevated lactate, hyponatremia 129, +UA with nitrites and leuk esterase, admitted for severe sepsis  2/2 UTI and metabolic encephalopathy iso of sepsis, hypoglycemia, hyperkalemia, hyponatremia, and likely beta blocker toxicity. Mental status now improved to A&Ox2

## 2023-10-12 NOTE — PROGRESS NOTE ADULT - PROBLEM SELECTOR PLAN 1
Pt presented with AMS, hypothermia to 95 F and leukocytosis to 21.53 w left shift.  UA positive for nitrites, small leuk esterase, many bacteria,  S/p vanc and zosyn and 1L NS in ED. Prior hx of UTI with organism resistant to CTX, sensitive to zosyn. RVP neg. Blood cx NG. MRSA pcr neg. RVP neg. Ucx +providencia stuartii, sensitive to CTX. Lactate downtrended to 2.4 --> 1.2    Plan:  c/w zosyn (10/7-10/10)  Pt started on ceftriaxone (10/11-  PO hydration

## 2023-10-12 NOTE — DISCHARGE NOTE NURSING/CASE MANAGEMENT/SOCIAL WORK - NSDCFUADDAPPT_GEN_ALL_CORE_FT
APPTS ARE READY TO BE MADE: [ X] YES    Best Family or Patient Contact (if needed):    Additional Information about above appointments (if needed):    1: internal medicine  2: dental clinic  3:     Other comments or requests:

## 2023-10-12 NOTE — DISCHARGE NOTE NURSING/CASE MANAGEMENT/SOCIAL WORK - PATIENT PORTAL LINK FT
You can access the FollowMyHealth Patient Portal offered by [facility] by registering at the following website: [email or web address]. By joining [facility]’s FollowMyHealth portal, you will also be able to view your health information using other applications (apps) compatible with our system.

## 2023-10-12 NOTE — PROGRESS NOTE ADULT - PROBLEM SELECTOR PROBLEM 7
History of CVA (cerebrovascular accident)

## 2023-10-16 NOTE — CHART NOTE - NSCHARTNOTEFT_GEN_A_CORE
Left voicemail to return our call
Left 2 messages for patient in regards to follow up care with callback information.

## 2024-01-01 ENCOUNTER — INPATIENT (INPATIENT)
Facility: HOSPITAL | Age: 79
LOS: 0 days | End: 2024-06-19
Payer: MEDICAID

## 2024-01-01 VITALS — SYSTOLIC BLOOD PRESSURE: 77 MMHG | HEART RATE: 119 BPM | DIASTOLIC BLOOD PRESSURE: 36 MMHG | RESPIRATION RATE: 26 BRPM

## 2024-01-01 VITALS — DIASTOLIC BLOOD PRESSURE: 55 MMHG | SYSTOLIC BLOOD PRESSURE: 87 MMHG | RESPIRATION RATE: 30 BRPM | HEART RATE: 69 BPM

## 2024-01-01 DIAGNOSIS — Z95.1 PRESENCE OF AORTOCORONARY BYPASS GRAFT: Chronic | ICD-10-CM

## 2024-01-01 DIAGNOSIS — K55.9 VASCULAR DISORDER OF INTESTINE, UNSPECIFIED: ICD-10-CM

## 2024-01-01 LAB
-  CTX-M RESISTANCE MARKER: SIGNIFICANT CHANGE UP
-  ESBL: SIGNIFICANT CHANGE UP
-  K. PNEUMONIAE GROUP: SIGNIFICANT CHANGE UP
A1C WITH ESTIMATED AVERAGE GLUCOSE RESULT: 6.4 % — HIGH (ref 4–5.6)
ADD ON TEST-SPECIMEN IN LAB: SIGNIFICANT CHANGE UP
ADD ON TEST-SPECIMEN IN LAB: SIGNIFICANT CHANGE UP
ALBUMIN SERPL ELPH-MCNC: 2 G/DL — LOW (ref 3.3–5)
ALBUMIN SERPL ELPH-MCNC: 2.7 G/DL — LOW (ref 3.3–5)
ALP SERPL-CCNC: 251 U/L — HIGH (ref 40–120)
ALP SERPL-CCNC: 269 U/L — HIGH (ref 40–120)
ALT FLD-CCNC: 147 U/L — HIGH (ref 4–41)
ALT FLD-CCNC: 495 U/L — HIGH (ref 4–41)
ANION GAP SERPL CALC-SCNC: 28 MMOL/L — HIGH (ref 7–14)
ANION GAP SERPL CALC-SCNC: >47 MMOL/L — HIGH (ref 7–14)
ANISOCYTOSIS BLD QL: SLIGHT — SIGNIFICANT CHANGE UP
APPEARANCE UR: ABNORMAL
APTT BLD: 100.3 SEC — HIGH (ref 24.5–35.6)
APTT BLD: 35 SEC — SIGNIFICANT CHANGE UP (ref 24.5–35.6)
AST SERPL-CCNC: 1251 U/L — HIGH (ref 4–40)
AST SERPL-CCNC: 244 U/L — HIGH (ref 4–40)
BACTERIA # UR AUTO: ABNORMAL /HPF
BASE EXCESS BLDV CALC-SCNC: -17.9 MMOL/L — LOW (ref -2–3)
BASE EXCESS BLDV CALC-SCNC: -20.2 MMOL/L — LOW (ref -2–3)
BASE EXCESS BLDV CALC-SCNC: -24 MMOL/L — LOW (ref -2–3)
BASE EXCESS BLDV CALC-SCNC: SIGNIFICANT CHANGE UP MMOL/L (ref -2–3)
BASOPHILS # BLD AUTO: 0 K/UL — SIGNIFICANT CHANGE UP (ref 0–0.2)
BASOPHILS # BLD AUTO: 0.23 K/UL — HIGH (ref 0–0.2)
BASOPHILS NFR BLD AUTO: 0 % — SIGNIFICANT CHANGE UP (ref 0–2)
BASOPHILS NFR BLD AUTO: 0.6 % — SIGNIFICANT CHANGE UP (ref 0–2)
BILIRUB DIRECT SERPL-MCNC: 2.3 MG/DL — HIGH (ref 0–0.3)
BILIRUB SERPL-MCNC: 2.4 MG/DL — HIGH (ref 0.2–1.2)
BILIRUB SERPL-MCNC: 2.7 MG/DL — HIGH (ref 0.2–1.2)
BILIRUB UR-MCNC: ABNORMAL
BLOOD GAS VENOUS COMPREHENSIVE RESULT: SIGNIFICANT CHANGE UP
BUN SERPL-MCNC: 26 MG/DL — HIGH (ref 7–23)
BUN SERPL-MCNC: 28 MG/DL — HIGH (ref 7–23)
BURR CELLS BLD QL SMEAR: PRESENT — SIGNIFICANT CHANGE UP
CA-I BLD-SCNC: 0.88 MMOL/L — LOW (ref 1.15–1.29)
CA-I SERPL-SCNC: 1.13 MMOL/L — LOW (ref 1.15–1.33)
CALCIUM SERPL-MCNC: 7.7 MG/DL — LOW (ref 8.4–10.5)
CALCIUM SERPL-MCNC: 8.5 MG/DL — SIGNIFICANT CHANGE UP (ref 8.4–10.5)
CAST: 11 /LPF — HIGH (ref 0–4)
CHLORIDE BLDV-SCNC: 102 MMOL/L — SIGNIFICANT CHANGE UP (ref 96–108)
CHLORIDE BLDV-SCNC: 103 MMOL/L — SIGNIFICANT CHANGE UP (ref 96–108)
CHLORIDE BLDV-SCNC: 74 MMOL/L — LOW (ref 96–108)
CHLORIDE BLDV-SCNC: 94 MMOL/L — LOW (ref 96–108)
CHLORIDE SERPL-SCNC: 82 MMOL/L — LOW (ref 98–107)
CHLORIDE SERPL-SCNC: 99 MMOL/L — SIGNIFICANT CHANGE UP (ref 98–107)
CO2 BLDV-SCNC: 11.7 MMOL/L — LOW (ref 22–26)
CO2 BLDV-SCNC: 12.1 MMOL/L — LOW (ref 22–26)
CO2 BLDV-SCNC: 8.5 MMOL/L — LOW (ref 22–26)
CO2 BLDV-SCNC: SIGNIFICANT CHANGE UP MMOL/L (ref 22–26)
CO2 SERPL-SCNC: 11 MMOL/L — LOW (ref 22–31)
CO2 SERPL-SCNC: <7 MMOL/L — CRITICAL LOW (ref 22–31)
COLOR SPEC: SIGNIFICANT CHANGE UP
CREAT SERPL-MCNC: 2.36 MG/DL — HIGH (ref 0.5–1.3)
CREAT SERPL-MCNC: 2.52 MG/DL — HIGH (ref 0.5–1.3)
CULTURE RESULTS: NO GROWTH — SIGNIFICANT CHANGE UP
DIFF PNL FLD: ABNORMAL
EGFR: 25 ML/MIN/1.73M2 — LOW
EGFR: 27 ML/MIN/1.73M2 — LOW
EOSINOPHIL # BLD AUTO: 0 K/UL — SIGNIFICANT CHANGE UP (ref 0–0.5)
EOSINOPHIL # BLD AUTO: 0.1 K/UL — SIGNIFICANT CHANGE UP (ref 0–0.5)
EOSINOPHIL NFR BLD AUTO: 0 % — SIGNIFICANT CHANGE UP (ref 0–6)
EOSINOPHIL NFR BLD AUTO: 0.3 % — SIGNIFICANT CHANGE UP (ref 0–6)
ESTIMATED AVERAGE GLUCOSE: 137 — SIGNIFICANT CHANGE UP
FLUAV AG NPH QL: SIGNIFICANT CHANGE UP
FLUBV AG NPH QL: SIGNIFICANT CHANGE UP
GAS PNL BLDV: 131 MMOL/L — LOW (ref 136–145)
GAS PNL BLDV: 134 MMOL/L — LOW (ref 136–145)
GAS PNL BLDV: 136 MMOL/L — SIGNIFICANT CHANGE UP (ref 136–145)
GAS PNL BLDV: <100 MMOL/L — CRITICAL LOW (ref 136–145)
GAS PNL BLDV: SIGNIFICANT CHANGE UP
GIANT PLATELETS BLD QL SMEAR: PRESENT — SIGNIFICANT CHANGE UP
GLUCOSE BLDC GLUCOMTR-MCNC: 273 MG/DL — HIGH (ref 70–99)
GLUCOSE BLDC GLUCOMTR-MCNC: 277 MG/DL — HIGH (ref 70–99)
GLUCOSE BLDC GLUCOMTR-MCNC: 364 MG/DL — HIGH (ref 70–99)
GLUCOSE BLDC GLUCOMTR-MCNC: 393 MG/DL — HIGH (ref 70–99)
GLUCOSE BLDC GLUCOMTR-MCNC: 55 MG/DL — LOW (ref 70–99)
GLUCOSE BLDC GLUCOMTR-MCNC: 94 MG/DL — SIGNIFICANT CHANGE UP (ref 70–99)
GLUCOSE BLDV-MCNC: 181 MG/DL — HIGH (ref 70–99)
GLUCOSE BLDV-MCNC: 203 MG/DL — HIGH (ref 70–99)
GLUCOSE BLDV-MCNC: 301 MG/DL — HIGH (ref 70–99)
GLUCOSE BLDV-MCNC: >685 MG/DL — CRITICAL HIGH (ref 70–99)
GLUCOSE SERPL-MCNC: 197 MG/DL — HIGH (ref 70–99)
GLUCOSE SERPL-MCNC: 346 MG/DL — HIGH (ref 70–99)
GLUCOSE UR QL: 100 MG/DL
GRAM STN FLD: ABNORMAL
GRAN CASTS # UR COMP ASSIST: PRESENT
HAPTOGLOB SERPL-MCNC: 208 MG/DL — HIGH (ref 34–200)
HCO3 BLDV-SCNC: 10 MMOL/L — CRITICAL LOW (ref 22–29)
HCO3 BLDV-SCNC: 11 MMOL/L — LOW (ref 22–29)
HCO3 BLDV-SCNC: 7 MMOL/L — CRITICAL LOW (ref 22–29)
HCO3 BLDV-SCNC: SIGNIFICANT CHANGE UP MMOL/L (ref 22–29)
HCT VFR BLD CALC: 27.6 % — LOW (ref 39–50)
HCT VFR BLD CALC: 32.4 % — LOW (ref 39–50)
HCT VFR BLDA CALC: 19 % — CRITICAL LOW (ref 39–51)
HCT VFR BLDA CALC: 26 % — LOW (ref 39–51)
HCT VFR BLDA CALC: 29 % — LOW (ref 39–51)
HCT VFR BLDA CALC: 31 % — LOW (ref 39–51)
HGB BLD CALC-MCNC: 10.2 G/DL — LOW (ref 12.6–17.4)
HGB BLD CALC-MCNC: 6.4 G/DL — CRITICAL LOW (ref 12.6–17.4)
HGB BLD CALC-MCNC: 8.8 G/DL — LOW (ref 12.6–17.4)
HGB BLD CALC-MCNC: 9.6 G/DL — LOW (ref 12.6–17.4)
HGB BLD-MCNC: 10.1 G/DL — LOW (ref 13–17)
HGB BLD-MCNC: 7.9 G/DL — LOW (ref 13–17)
IANC: 25.42 K/UL — HIGH (ref 1.8–7.4)
IANC: 38.42 K/UL — HIGH (ref 1.8–7.4)
IMM GRANULOCYTES NFR BLD AUTO: 19.2 % — HIGH (ref 0–0.9)
INR BLD: 1.82 RATIO — HIGH (ref 0.85–1.18)
INR BLD: 2.87 RATIO — HIGH (ref 0.85–1.18)
KETONES UR-MCNC: ABNORMAL MG/DL
LACTATE BLDV-MCNC: 15.1 MMOL/L — CRITICAL HIGH (ref 0.5–2)
LACTATE BLDV-MCNC: 16.9 MMOL/L — CRITICAL HIGH (ref 0.5–2)
LACTATE BLDV-MCNC: >17 MMOL/L — CRITICAL HIGH (ref 0.5–2)
LACTATE BLDV-MCNC: >17 MMOL/L — CRITICAL HIGH (ref 0.5–2)
LDH SERPL L TO P-CCNC: 516 U/L — HIGH (ref 135–225)
LEGIONELLA AG UR QL: NEGATIVE — SIGNIFICANT CHANGE UP
LEUKOCYTE ESTERASE UR-ACNC: ABNORMAL
LYMPHOCYTES # BLD AUTO: 0.78 K/UL — LOW (ref 1–3.3)
LYMPHOCYTES # BLD AUTO: 1.7 % — LOW (ref 13–44)
LYMPHOCYTES # BLD AUTO: 12.8 % — LOW (ref 13–44)
LYMPHOCYTES # BLD AUTO: 5.08 K/UL — HIGH (ref 1–3.3)
MAGNESIUM SERPL-MCNC: 2.6 MG/DL — SIGNIFICANT CHANGE UP (ref 1.6–2.6)
MANUAL SMEAR VERIFICATION: SIGNIFICANT CHANGE UP
MCHC RBC-ENTMCNC: 27.5 PG — SIGNIFICANT CHANGE UP (ref 27–34)
MCHC RBC-ENTMCNC: 27.8 PG — SIGNIFICANT CHANGE UP (ref 27–34)
MCHC RBC-ENTMCNC: 28.6 GM/DL — LOW (ref 32–36)
MCHC RBC-ENTMCNC: 31.2 GM/DL — LOW (ref 32–36)
MCV RBC AUTO: 89.3 FL — SIGNIFICANT CHANGE UP (ref 80–100)
MCV RBC AUTO: 96.2 FL — SIGNIFICANT CHANGE UP (ref 80–100)
METAMYELOCYTES # FLD: 9.6 % — HIGH (ref 0–1)
METHOD TYPE: SIGNIFICANT CHANGE UP
MONOCYTES # BLD AUTO: 0.78 K/UL — SIGNIFICANT CHANGE UP (ref 0–0.9)
MONOCYTES # BLD AUTO: 1.3 K/UL — HIGH (ref 0–0.9)
MONOCYTES NFR BLD AUTO: 1.7 % — LOW (ref 2–14)
MONOCYTES NFR BLD AUTO: 3.3 % — SIGNIFICANT CHANGE UP (ref 2–14)
MRSA PCR RESULT.: SIGNIFICANT CHANGE UP
MYELOCYTES NFR BLD: 3.5 % — HIGH (ref 0–0)
NEUTROPHILS # BLD AUTO: 25.42 K/UL — HIGH (ref 1.8–7.4)
NEUTROPHILS # BLD AUTO: 37.89 K/UL — HIGH (ref 1.8–7.4)
NEUTROPHILS NFR BLD AUTO: 63.8 % — SIGNIFICANT CHANGE UP (ref 43–77)
NEUTROPHILS NFR BLD AUTO: 69.6 % — SIGNIFICANT CHANGE UP (ref 43–77)
NEUTS BAND # BLD: 13 % — CRITICAL HIGH (ref 0–6)
NITRITE UR-MCNC: NEGATIVE — SIGNIFICANT CHANGE UP
NRBC # BLD: 1 /100 WBCS — HIGH (ref 0–0)
NRBC # BLD: 1 /100 WBCS — HIGH (ref 0–0)
NRBC # FLD: 0.42 K/UL — HIGH (ref 0–0)
PCO2 BLDV: 30 MMHG — LOW (ref 42–55)
PCO2 BLDV: 37 MMHG — LOW (ref 42–55)
PCO2 BLDV: 37 MMHG — LOW (ref 42–55)
PCO2 BLDV: 43 MMHG — SIGNIFICANT CHANGE UP (ref 42–55)
PH BLDV: 6.91 — LOW (ref 7.32–7.43)
PH BLDV: 6.99 — LOW (ref 7.32–7.43)
PH BLDV: 7.08 — LOW (ref 7.32–7.43)
PH BLDV: <6.9 — SIGNIFICANT CHANGE UP (ref 7.32–7.43)
PH UR: 6 — SIGNIFICANT CHANGE UP (ref 5–8)
PHOSPHATE SERPL-MCNC: 14.4 MG/DL — HIGH (ref 2.5–4.5)
PLAT MORPH BLD: ABNORMAL
PLATELET # BLD AUTO: 145 K/UL — LOW (ref 150–400)
PLATELET # BLD AUTO: 248 K/UL — SIGNIFICANT CHANGE UP (ref 150–400)
PLATELET COUNT - ESTIMATE: NORMAL — SIGNIFICANT CHANGE UP
PO2 BLDV: 110 MMHG — HIGH (ref 25–45)
PO2 BLDV: 47 MMHG — HIGH (ref 25–45)
PO2 BLDV: 61 MMHG — HIGH (ref 25–45)
PO2 BLDV: 66 MMHG — HIGH (ref 25–45)
POIKILOCYTOSIS BLD QL AUTO: SLIGHT — SIGNIFICANT CHANGE UP
POLYCHROMASIA BLD QL SMEAR: SLIGHT — SIGNIFICANT CHANGE UP
POTASSIUM BLDV-SCNC: 3.7 MMOL/L — SIGNIFICANT CHANGE UP (ref 3.5–5.1)
POTASSIUM BLDV-SCNC: 4.8 MMOL/L — SIGNIFICANT CHANGE UP (ref 3.5–5.1)
POTASSIUM BLDV-SCNC: 5.2 MMOL/L — HIGH (ref 3.5–5.1)
POTASSIUM BLDV-SCNC: 5.3 MMOL/L — HIGH (ref 3.5–5.1)
POTASSIUM SERPL-MCNC: 4.8 MMOL/L — SIGNIFICANT CHANGE UP (ref 3.5–5.3)
POTASSIUM SERPL-MCNC: 8.8 MMOL/L — CRITICAL HIGH (ref 3.5–5.3)
POTASSIUM SERPL-SCNC: 4.8 MMOL/L — SIGNIFICANT CHANGE UP (ref 3.5–5.3)
POTASSIUM SERPL-SCNC: 8.8 MMOL/L — CRITICAL HIGH (ref 3.5–5.3)
PROCALCITONIN SERPL-MCNC: >100 NG/ML — HIGH (ref 0.02–0.1)
PROT SERPL-MCNC: 4.3 G/DL — LOW (ref 6–8.3)
PROT SERPL-MCNC: 6.1 G/DL — SIGNIFICANT CHANGE UP (ref 6–8.3)
PROT UR-MCNC: 300 MG/DL
PROTHROM AB SERPL-ACNC: 20 SEC — HIGH (ref 9.5–13)
PROTHROM AB SERPL-ACNC: 31.2 SEC — HIGH (ref 9.5–13)
RBC # BLD: 2.87 M/UL — LOW (ref 4.2–5.8)
RBC # BLD: 3.63 M/UL — LOW (ref 4.2–5.8)
RBC # FLD: 14.9 % — HIGH (ref 10.3–14.5)
RBC # FLD: 15.2 % — HIGH (ref 10.3–14.5)
RBC BLD AUTO: ABNORMAL
RBC CASTS # UR COMP ASSIST: 9 /HPF — HIGH (ref 0–4)
REVIEW: SIGNIFICANT CHANGE UP
RSV RNA NPH QL NAA+NON-PROBE: SIGNIFICANT CHANGE UP
S AUREUS DNA NOSE QL NAA+PROBE: SIGNIFICANT CHANGE UP
SAO2 % BLDV: 70.9 % — SIGNIFICANT CHANGE UP (ref 67–88)
SAO2 % BLDV: 85.7 % — SIGNIFICANT CHANGE UP (ref 67–88)
SAO2 % BLDV: 90.4 % — HIGH (ref 67–88)
SAO2 % BLDV: 98.7 % — HIGH (ref 67–88)
SARS-COV-2 RNA SPEC QL NAA+PROBE: SIGNIFICANT CHANGE UP
SCHISTOCYTES BLD QL AUTO: SLIGHT — SIGNIFICANT CHANGE UP
SODIUM SERPL-SCNC: 136 MMOL/L — SIGNIFICANT CHANGE UP (ref 135–145)
SODIUM SERPL-SCNC: 138 MMOL/L — SIGNIFICANT CHANGE UP (ref 135–145)
SP GR SPEC: 1.03 — SIGNIFICANT CHANGE UP (ref 1–1.03)
SPECIMEN SOURCE: SIGNIFICANT CHANGE UP
SQUAMOUS # UR AUTO: 14 /HPF — HIGH (ref 0–5)
UROBILINOGEN FLD QL: 1 MG/DL — SIGNIFICANT CHANGE UP (ref 0.2–1)
VANCOMYCIN FLD-MCNC: 16.9 UG/ML — SIGNIFICANT CHANGE UP
VARIANT LYMPHS # BLD: 0.9 % — SIGNIFICANT CHANGE UP (ref 0–6)
WBC # BLD: 39.75 K/UL — HIGH (ref 3.8–10.5)
WBC # BLD: 45.87 K/UL — CRITICAL HIGH (ref 3.8–10.5)
WBC # FLD AUTO: 39.75 K/UL — HIGH (ref 3.8–10.5)
WBC # FLD AUTO: 45.87 K/UL — CRITICAL HIGH (ref 3.8–10.5)
WBC UR QL: 7 /HPF — HIGH (ref 0–5)
YEAST-LIKE CELLS: PRESENT

## 2024-01-01 PROCEDURE — 93308 TTE F-UP OR LMTD: CPT | Mod: 26

## 2024-01-01 PROCEDURE — 99292 CRITICAL CARE ADDL 30 MIN: CPT | Mod: GC

## 2024-01-01 PROCEDURE — 74174 CTA ABD&PLVS W/CONTRAST: CPT | Mod: 26,MC

## 2024-01-01 PROCEDURE — 71045 X-RAY EXAM CHEST 1 VIEW: CPT | Mod: 26,76

## 2024-01-01 PROCEDURE — 99291 CRITICAL CARE FIRST HOUR: CPT | Mod: GC

## 2024-01-01 PROCEDURE — 99291 CRITICAL CARE FIRST HOUR: CPT | Mod: 25

## 2024-01-01 PROCEDURE — 36556 INSERT NON-TUNNEL CV CATH: CPT | Mod: RT

## 2024-01-01 PROCEDURE — 31500 INSERT EMERGENCY AIRWAY: CPT

## 2024-01-01 RX ORDER — NOREPINEPHRINE BITARTRATE 1 MG/ML
0.8 INJECTION INTRAVENOUS
Qty: 16 | Refills: 0 | Status: DISCONTINUED | OUTPATIENT
Start: 2024-01-01 | End: 2024-01-01

## 2024-01-01 RX ORDER — DEXTROSE MONOHYDRATE AND SODIUM CHLORIDE 5; .3 G/100ML; G/100ML
1000 INJECTION, SOLUTION INTRAVENOUS
Refills: 0 | Status: DISCONTINUED | OUTPATIENT
Start: 2024-01-01 | End: 2024-01-01

## 2024-01-01 RX ORDER — DEXTROSE 30 % IN WATER 30 %
50 VIAL (ML) INTRAVENOUS ONCE
Refills: 0 | Status: COMPLETED | OUTPATIENT
Start: 2024-01-01 | End: 2024-01-01

## 2024-01-01 RX ORDER — CALCIUM GLUCONATE 98 MG/ML
1 INJECTION, SOLUTION INTRAVENOUS ONCE
Refills: 0 | Status: DISCONTINUED | OUTPATIENT
Start: 2024-01-01 | End: 2024-01-01

## 2024-01-01 RX ORDER — VASOPRESSIN 20 UNIT/ML
0.04 VIAL (ML) INJECTION
Qty: 40 | Refills: 0 | Status: DISCONTINUED | OUTPATIENT
Start: 2024-01-01 | End: 2024-01-01

## 2024-01-01 RX ORDER — SODIUM BICARBONATE 650 MG/1
25 TABLET ORAL ONCE
Refills: 0 | Status: COMPLETED | OUTPATIENT
Start: 2024-01-01 | End: 2024-01-01

## 2024-01-01 RX ORDER — PIPERACILLIN SODIUM AND TAZOBACTAM SODIUM 3; .375 G/15ML; G/15ML
3.38 INJECTION, POWDER, LYOPHILIZED, FOR SOLUTION INTRAVENOUS ONCE
Refills: 0 | Status: COMPLETED | OUTPATIENT
Start: 2024-01-01 | End: 2024-01-01

## 2024-01-01 RX ORDER — INSULIN LISPRO 100 [IU]/ML
7 INJECTION, SOLUTION SUBCUTANEOUS ONCE
Refills: 0 | Status: COMPLETED | OUTPATIENT
Start: 2024-01-01 | End: 2024-01-01

## 2024-01-01 RX ORDER — POLYVINYL ALCOHOL, POVIDONE .5; .6 G/100ML; G/100ML
1 SOLUTION OPHTHALMIC DAILY
Refills: 0 | Status: DISCONTINUED | OUTPATIENT
Start: 2024-01-01 | End: 2024-01-01

## 2024-01-01 RX ORDER — SODIUM BICARBONATE 650 MG/1
0.38 TABLET ORAL
Qty: 150 | Refills: 0 | Status: DISCONTINUED | OUTPATIENT
Start: 2024-01-01 | End: 2024-01-01

## 2024-01-01 RX ORDER — NOREPINEPHRINE BITARTRATE 1 MG/ML
0.05 INJECTION INTRAVENOUS
Qty: 8 | Refills: 0 | Status: DISCONTINUED | OUTPATIENT
Start: 2024-01-01 | End: 2024-01-01

## 2024-01-01 RX ORDER — INSULIN LISPRO 100 [IU]/ML
INJECTION, SOLUTION SUBCUTANEOUS EVERY 6 HOURS
Refills: 0 | Status: DISCONTINUED | OUTPATIENT
Start: 2024-01-01 | End: 2024-01-01

## 2024-01-01 RX ORDER — HEPARIN SODIUM 50 [USP'U]/ML
5000 INJECTION, SOLUTION INTRAVENOUS EVERY 12 HOURS
Refills: 0 | Status: DISCONTINUED | OUTPATIENT
Start: 2024-01-01 | End: 2024-01-01

## 2024-01-01 RX ORDER — ACETAMINOPHEN 325 MG
1000 TABLET ORAL ONCE
Refills: 0 | Status: COMPLETED | OUTPATIENT
Start: 2024-01-01 | End: 2024-01-01

## 2024-01-01 RX ORDER — DEXTROSE MONOHYDRATE 100 MG/ML
125 INJECTION, SOLUTION INTRAVENOUS ONCE
Refills: 0 | Status: DISCONTINUED | OUTPATIENT
Start: 2024-01-01 | End: 2024-01-01

## 2024-01-01 RX ORDER — DEXTROSE MONOHYDRATE AND SODIUM CHLORIDE 5; .3 G/100ML; G/100ML
1000 INJECTION, SOLUTION INTRAVENOUS ONCE
Refills: 0 | Status: COMPLETED | OUTPATIENT
Start: 2024-01-01 | End: 2024-01-01

## 2024-01-01 RX ORDER — VANCOMYCIN HYDROCHLORIDE 50 MG/ML
1000 KIT ORAL ONCE
Refills: 0 | Status: COMPLETED | OUTPATIENT
Start: 2024-01-01 | End: 2024-01-01

## 2024-01-01 RX ORDER — DEXTROSE 30 % IN WATER 30 %
25 VIAL (ML) INTRAVENOUS ONCE
Refills: 0 | Status: DISCONTINUED | OUTPATIENT
Start: 2024-01-01 | End: 2024-01-01

## 2024-01-01 RX ORDER — PHENYLEPHRINE HYDROCHLORIDE 10 MG/ML
20 INJECTION INTRAVENOUS ONCE
Refills: 0 | Status: COMPLETED | OUTPATIENT
Start: 2024-01-01 | End: 2024-01-01

## 2024-01-01 RX ORDER — GLUCAGON HYDROCHLORIDE 1 MG/ML
1 INJECTION, POWDER, FOR SOLUTION INTRAMUSCULAR; INTRAVENOUS; SUBCUTANEOUS ONCE
Refills: 0 | Status: DISCONTINUED | OUTPATIENT
Start: 2024-01-01 | End: 2024-01-01

## 2024-01-01 RX ORDER — DEXTROSE 30 % IN WATER 30 %
12.5 VIAL (ML) INTRAVENOUS ONCE
Refills: 0 | Status: DISCONTINUED | OUTPATIENT
Start: 2024-01-01 | End: 2024-01-01

## 2024-01-01 RX ORDER — HYDROCORTISONE 100 MG/60ML
50 SUSPENSION RECTAL EVERY 6 HOURS
Refills: 0 | Status: DISCONTINUED | OUTPATIENT
Start: 2024-01-01 | End: 2024-01-01

## 2024-01-01 RX ORDER — DEXTROSE 30 % IN WATER 30 %
15 VIAL (ML) INTRAVENOUS ONCE
Refills: 0 | Status: DISCONTINUED | OUTPATIENT
Start: 2024-01-01 | End: 2024-01-01

## 2024-01-01 RX ORDER — SODIUM CHLORIDE 0.9 % (FLUSH) 0.9 %
1000 SYRINGE (ML) INJECTION ONCE
Refills: 0 | Status: COMPLETED | OUTPATIENT
Start: 2024-01-01 | End: 2024-01-01

## 2024-01-01 RX ORDER — PIPERACILLIN SODIUM AND TAZOBACTAM SODIUM 3; .375 G/15ML; G/15ML
3.38 INJECTION, POWDER, LYOPHILIZED, FOR SOLUTION INTRAVENOUS EVERY 8 HOURS
Refills: 0 | Status: DISCONTINUED | OUTPATIENT
Start: 2024-01-01 | End: 2024-01-01

## 2024-01-01 RX ADMIN — SODIUM BICARBONATE 25 MILLIEQUIVALENT(S): 650 TABLET ORAL at 11:20

## 2024-01-01 RX ADMIN — PIPERACILLIN SODIUM AND TAZOBACTAM SODIUM 25 GRAM(S): 3; .375 INJECTION, POWDER, LYOPHILIZED, FOR SOLUTION INTRAVENOUS at 00:16

## 2024-01-01 RX ADMIN — PHENYLEPHRINE HYDROCHLORIDE 20 MICROGRAM(S): 10 INJECTION INTRAVENOUS at 10:45

## 2024-01-01 RX ADMIN — INSULIN LISPRO 7 UNIT(S): 100 INJECTION, SOLUTION SUBCUTANEOUS at 00:16

## 2024-01-01 RX ADMIN — Medication 6 UNIT(S)/MIN: at 19:37

## 2024-01-01 RX ADMIN — Medication 1 APPLICATION(S): at 16:20

## 2024-01-01 RX ADMIN — NOREPINEPHRINE BITARTRATE 5.63 MICROGRAM(S)/KG/MIN: 1 INJECTION INTRAVENOUS at 11:31

## 2024-01-01 RX ADMIN — POLYVINYL ALCOHOL, POVIDONE 1 APPLICATION(S): .5; .6 SOLUTION OPHTHALMIC at 16:21

## 2024-01-01 RX ADMIN — SODIUM BICARBONATE 150 MEQ/KG/HR: 650 TABLET ORAL at 11:32

## 2024-01-01 RX ADMIN — DEXTROSE MONOHYDRATE AND SODIUM CHLORIDE 1000 MILLILITER(S): 5; .3 INJECTION, SOLUTION INTRAVENOUS at 14:27

## 2024-01-01 RX ADMIN — PIPERACILLIN SODIUM AND TAZOBACTAM SODIUM 200 GRAM(S): 3; .375 INJECTION, POWDER, LYOPHILIZED, FOR SOLUTION INTRAVENOUS at 16:21

## 2024-01-01 RX ADMIN — HEPARIN SODIUM 5000 UNIT(S): 50 INJECTION, SOLUTION INTRAVENOUS at 17:10

## 2024-01-01 RX ADMIN — NOREPINEPHRINE BITARTRATE 45 MICROGRAM(S)/KG/MIN: 1 INJECTION INTRAVENOUS at 16:21

## 2024-01-01 RX ADMIN — Medication 6 UNIT(S)/MIN: at 16:20

## 2024-01-01 RX ADMIN — PIPERACILLIN SODIUM AND TAZOBACTAM SODIUM 200 GRAM(S): 3; .375 INJECTION, POWDER, LYOPHILIZED, FOR SOLUTION INTRAVENOUS at 09:45

## 2024-01-01 RX ADMIN — VANCOMYCIN HYDROCHLORIDE 250 MILLIGRAM(S): KIT at 11:58

## 2024-01-01 RX ADMIN — NOREPINEPHRINE BITARTRATE 45 MICROGRAM(S)/KG/MIN: 1 INJECTION INTRAVENOUS at 19:37

## 2024-01-01 RX ADMIN — SODIUM BICARBONATE 150 MEQ/KG/HR: 650 TABLET ORAL at 19:37

## 2024-01-01 RX ADMIN — HYDROCORTISONE 50 MILLIGRAM(S): 100 SUSPENSION RECTAL at 17:11

## 2024-01-01 RX ADMIN — HYDROCORTISONE 50 MILLIGRAM(S): 100 SUSPENSION RECTAL at 23:51

## 2024-01-01 RX ADMIN — Medication 15 MILLILITER(S): at 17:10

## 2024-01-01 RX ADMIN — Medication 400 MILLIGRAM(S): at 09:15

## 2024-01-01 RX ADMIN — Medication 1000 MILLILITER(S): at 09:11

## 2024-02-20 NOTE — ED ADULT NURSE NOTE - BP NONINVASIVE DIASTOLIC (MM HG)
1. MDD (major depressive disorder), recurrent episode, moderate (HCC)  Assessment & Plan:  Not at goal. Increase zoloft to 100 mg. Start medication for ADHD. Follow up in 4 weeks or so.     Orders:  -     sertraline (ZOLOFT) 100 mg tablet; Take 1 tablet (100 mg total) by mouth daily    2. Anxiety  Assessment & Plan:  Not well controlled. Increase zoloft from 50 to 100 mg daily. Send [employer] message in 4 weeks or so.     Orders:  -     sertraline (ZOLOFT) 100 mg tablet; Take 1 tablet (100 mg total) by mouth daily    3. Class 3 severe obesity due to excess calories with serious comorbidity and body mass index (BMI) of 50.0 to 59.9 in adult (HCC)  Assessment & Plan:  Recommend Zepbound. He will check on coverage.   Pt has not had success with weight loss with healthy diet and exercise alone, but will continue to work on these.   he  has not been on any weight loss medicines in the past 12 months  Body mass index is 56.29 kg/m².  This is a new start - he is not on another GLP medication currently or in the past 12 months   No personal or family history of thyroid cancer  No personal history of pancreatitis  A GLP medication is recommended for improvement in weight and comorbidities associated with obesity   Risks and benefits and side effects of medication reviewed   Follow up 3 months after starting GLP         4. Attention deficit hyperactivity disorder (ADHD), combined type  Assessment & Plan:  Reviewed symptoms, previous meds, risks and benefits and side effects. Agrees to start Adderall 20 mg. Controlled Substance Review    [place] PDMP or [place]  reviewed: No red flags were identified; safe to proceed with prescription..  Follow up in 4 weeks     Orders:  -     amphetamine-dextroamphetamine (ADDERALL XR) 15 MG 24 hr capsule; Take 1 capsule (15 mg total) by mouth every morning Max Daily Amount: 15 mg    5. Elevated LFTs  Assessment & Plan:  Update liver US. Work on weight loss. Should improve with GLP     Orders:  -      US abdomen complete; Future; Expected date: 02/20/2024  -     Comprehensive metabolic panel; Future; Expected date: 05/20/2024    6. Subclinical hypothyroidism  Assessment & Plan:  Repeat TSH. Normal TPO Ab     Orders:  -     TSH, 3rd generation; Future; Expected date: 05/20/2024    7. Low testosterone  Assessment & Plan:  Already referred to urology.       8. Vitamin D deficiency  Assessment & Plan:  Low at 22. Start prescription 50,0000 iu and update labs in 12 weeks    Orders:  -     Vitamin D 25 hydroxy; Future; Expected date: 05/20/2024    9. Pure hypercholesterolemia  Assessment & Plan:  Not well controlled. Pt to work on diet and exericise. Recommend zepbound. Weight loss should help get to goal.        10. Snoring  Assessment & Plan:  Suspect EMELYN. Refer for sleep study.     Orders:  -     Ambulatory referral to Sleep Medicine; Future         Return in about 1 month (around 3/20/2024) for ADD check .    Subjective:   Ildefonso is a 33 y.o. male here today for a follow-up on his current medical conditions:    Patient Active Problem List   Diagnosis    Anxiety    Class 3 severe obesity due to excess calories with serious comorbidity and body mass index (BMI) of 50.0 to 59.9 in adult (HCC)    Cavovarus deformity of foot    Chronic fatigue    Chronic pain of left ankle    Circadian rhythm disorder    Eczema    Enuresis, nocturnal only    Incomplete emptying of bladder    Metatarsalgia    Persistent insomnia    Plantarflexion deformity of foot    Seasonal allergies    Snoring    Vitamin D deficiency    Pure hypercholesterolemia    MDD (major depressive disorder), recurrent episode, moderate (HCC)    Mild intermittent asthma without complication    Subclinical hypothyroidism    Attention deficit hyperactivity disorder (ADHD), combined type    Elevated LFTs    Low testosterone       Patient Care Team:  Rossi Contreras DO as PCP - General (Family Medicine)    Current Medications:  Current Outpatient Medications    Medication Sig Dispense Refill    amphetamine-dextroamphetamine (ADDERALL XR) 15 MG 24 hr capsule Take 1 capsule (15 mg total) by mouth every morning Max Daily Amount: 15 mg 30 capsule 0    ergocalciferol (VITAMIN D2) 50,000 units Take 1 capsule (50,000 Units total) by mouth once a week After 12 weeks get blood work to determine your next dose. 12 capsule 0    levothyroxine 25 mcg tablet Take 25 mcg by mouth daily      mometasone (ELOCON) 0.1 % ointment APPLY TO AFFECTED AREA TOPICALLY EVERY DAY      multivitamin (THERAGRAN) TABS Take 1 tablet by mouth daily      Omega-3 Fatty Acids (FISH OIL PO) Take 3 capsules by mouth daily      sertraline (ZOLOFT) 100 mg tablet Take 1 tablet (100 mg total) by mouth daily 90 tablet 3     No current facility-administered medications for this visit.       HPI:  Chief Complaint   Patient presents with    Follow-up     1 mo     -- Above per clinical staff and reviewed. --    PHQ-2/9 Depression Screening    Little interest or pleasure in doing things: 2 - more than half the days  Feeling down, depressed, or hopeless: 1 - several days  Trouble falling or staying asleep, or sleeping too much: 3 - nearly every day  Feeling tired or having little energy: 1 - several days  Poor appetite or overeatin - not at all  Feeling bad about yourself - or that you are a failure or have let yourself or your family down: 1 - several days  Trouble concentrating on things, such as reading the newspaper or watching television: 2 - more than half the days  Moving or speaking so slowly that other people could have noticed. Or the opposite - being so fidgety or restless that you have been moving around a lot more than usual: 0 - not at all  Thoughts that you would be better off dead, or of hurting yourself in some way: 0 - not at all  PHQ-9 Score: 10  PHQ-9 Interpretation: Moderate depression       KEVIN-7 Flowsheet Screening      Flowsheet Row Most Recent Value   Over the last 2 weeks, how often have  "you been bothered by any of the following problems?    Feeling nervous, anxious, or on edge 1   Not being able to stop or control worrying 1   Worrying too much about different things 1   Trouble relaxing 2   Being so restless that it is hard to sit still 0   Becoming easily annoyed or irritable 0             Last visit switched from Prozac 80 mg to zoloft. 4 weeks follow up. Suspect EMELYN. Woul dlike treatment of ADHD. Consider GLP.   - needs liver US  - old records reviewed from [facility] evaluation - dx with ADHD inattentive type. Recommended cousneling for na  tamara.   He had repeat thyroid blood work too early.  Repeat TSH and T4 in about 2 months.  TPO antibody negative,  January 2024 blood work Free testosterone low at 7.8, total testosterone 408.  Refer to urology.  CBC normal, CMP fasting blood sugar 86, AS  T 58, ALT 74, total bilirubin 1.04, GFR 86, cholesterol 253, triglycerides 141, HDL 45,  TSH 5.762, free T4 normal at 1.01, vitamin D 22.8.      History of  Insomnia  doxepin 50 too sedating. Dx with auditory processing disorder as a child. States   he was dx with ADD in past at [facility] but Strattera not covered.. BP was high.   Today:  Mood the same, no side effects   Still with ADHD symptoms   When he gets mad closes off     Drinks weekend 6 pack or so. Not every weekend.   Used to drink nightly 12 beer and liquor age [age].   Marijana in the past. Last 1.5 yrs ago.   Brother with addiction issues.     The following portions of the patient's history were reviewed and updated as appropriate: allergies, current medications, past family history, past medical history, past social history, past surgical history and problem list.    Objective:  Vitals:  /82 (BP Location: Left arm, Patient Position: Sitting, Cuff Size: Large)   Pulse 68   Temp 98.6 °F (37 °C) (Temporal)   Resp 17   Ht 5' 9\" (1.753 m)   Wt (!) 173 kg (381 lb 3.2 oz)   SpO2 98%   BMI 56.29 kg/m²    Wt Readings from Last 3 " Encounters:   02/20/24 (!) 173 kg (381 lb 3.2 oz)   01/18/24 (!) 179 kg (393 lb 9.6 oz)      BP Readings from Last 3 Encounters:   02/20/24 124/82   01/18/24 124/90        Review of Systems   He has no other concerns. No unexpected weight changes. No chest pain, SOB, or palpitations. No GERD. No changes in bowels or bladder. Sleeping well. +  mood changes.       Physical Exam   Constitutional:  he appears well-developed and well-nourished.  HENT: Head: Normocephalic.   Neck: Neck supple.   Cardiovascular: Normal rate, regular rhythm and normal heart sounds.   Pulmonary/Chest: Effort normal and breath sounds normal. No wheezes, rales, or rhonchi.   Abdominal: Soft. Bowel sounds are normal. There is no tenderness. No hepatosplenomegaly.   Musculoskeletal: he exhibits no edema.   Lymphadenopathy: he has no cervical adenopathy.   Neurological: he is alert and oriented to person, place, and time.   Skin: Skin is warm and dry.   Psychiatric: he has a normal mood and affect. his behavior is normal. Thought content normal.       Depression Screening and Follow-up Plan: Patient's depression screening was positive with a PHQ-9 score of 10. Patient with underlying depression and was advised to continue current medications as prescribed. Medication increased today. Close follow up in 4 weeks.     Tobacco Cessation Counseling: Tobacco cessation counseling was not provided. The patient is sincerely urged to quit consumption of tobacco. He is not ready to quit tobacco. He does not currently smoke.        66 No

## 2024-06-20 LAB
-  AMPICILLIN/SULBACTAM: SIGNIFICANT CHANGE UP
-  AMPICILLIN: SIGNIFICANT CHANGE UP
-  AZTREONAM: SIGNIFICANT CHANGE UP
-  CEFAZOLIN: SIGNIFICANT CHANGE UP
-  CEFEPIME: SIGNIFICANT CHANGE UP
-  CEFTRIAXONE: SIGNIFICANT CHANGE UP
-  CIPROFLOXACIN: SIGNIFICANT CHANGE UP
-  ERTAPENEM: SIGNIFICANT CHANGE UP
-  GENTAMICIN: SIGNIFICANT CHANGE UP
-  IMIPENEM: SIGNIFICANT CHANGE UP
-  LEVOFLOXACIN: SIGNIFICANT CHANGE UP
-  MEROPENEM: SIGNIFICANT CHANGE UP
-  PIPERACILLIN/TAZOBACTAM: SIGNIFICANT CHANGE UP
-  TOBRAMYCIN: SIGNIFICANT CHANGE UP
-  TRIMETHOPRIM/SULFAMETHOXAZOLE: SIGNIFICANT CHANGE UP
CULTURE RESULTS: ABNORMAL
CULTURE RESULTS: ABNORMAL
METHOD TYPE: SIGNIFICANT CHANGE UP
ORGANISM # SPEC MICROSCOPIC CNT: ABNORMAL
SPECIMEN SOURCE: SIGNIFICANT CHANGE UP
SPECIMEN SOURCE: SIGNIFICANT CHANGE UP

## 2024-08-08 NOTE — SWALLOW BEDSIDE ASSESSMENT ADULT - H & P REVIEW
This patient is enrolled in the Follow Your Heart program and has undergone a cardiac surgery procedure within the last 30 days and has active care navigation. This patient can be followed up by the care navigation team within 24 hours. To arrange close follow-up or to obtain additional clinical information about this patient, please call the contact number above. Please call the cardiac surgery team once patient is registered at [phone] for consultation PRIOR to disposition decision.  The patient recently underwent a cardiac surgery procedure and the team can assist in acute medical management. yes

## 2024-09-05 NOTE — H&P ADULT - PROBLEM SELECTOR PLAN 5
Scr elevated to 2-unclear if it is SALBADOR or CKD   -urine Na, Cr, and urea   -US renal   -it could also be due to metformin toxicity   -Nephrology consulted. DISPLAY PLAN FREE TEXT Scr elevated to 2-unclear if it is SALBADOR or CKD   -urine Na, Cr, and urea   -US renal   -it could also be due to metformin toxicity   -Nephrology consult

## 2025-07-20 NOTE — PATIENT PROFILE ADULT - PACKS YRS CALCULATION
GDMT:   Beta Blocker: Continue Toprol 100 mg daily  ACEI/ARB/ARNI: Continue losartan 25 mg daily  MRA: Will hold off on initiating in the setting of CKD 3b and tendency for elevated K/hyperkalemia.   SGLT2i: Jardiance price check pending.  Device therapy: He is s/p ICD. Continue to follow with the device clinic.    Advanced HF referral placed     22